# Patient Record
Sex: FEMALE | Race: WHITE | Employment: OTHER | URBAN - METROPOLITAN AREA
[De-identification: names, ages, dates, MRNs, and addresses within clinical notes are randomized per-mention and may not be internally consistent; named-entity substitution may affect disease eponyms.]

---

## 2017-06-07 ENCOUNTER — OFFICE VISIT (OUTPATIENT)
Dept: FAMILY MEDICINE CLINIC | Age: 62
End: 2017-06-07

## 2017-06-07 VITALS
SYSTOLIC BLOOD PRESSURE: 126 MMHG | BODY MASS INDEX: 25.76 KG/M2 | DIASTOLIC BLOOD PRESSURE: 78 MMHG | WEIGHT: 140 LBS | HEIGHT: 62 IN | HEART RATE: 79 BPM | OXYGEN SATURATION: 97 %

## 2017-06-07 DIAGNOSIS — Z00.00 ROUTINE PHYSICAL EXAMINATION: ICD-10-CM

## 2017-06-07 DIAGNOSIS — Z00.00 ROUTINE PHYSICAL EXAMINATION: Primary | ICD-10-CM

## 2017-06-07 PROCEDURE — 99396 PREV VISIT EST AGE 40-64: CPT | Performed by: FAMILY MEDICINE

## 2017-06-07 ASSESSMENT — ENCOUNTER SYMPTOMS
ABDOMINAL PAIN: 0
COLOR CHANGE: 0
NAUSEA: 0
EYE REDNESS: 0
SHORTNESS OF BREATH: 0
EYE DISCHARGE: 0

## 2017-06-08 LAB
A/G RATIO: 1.8 (ref 1.1–2.2)
ALBUMIN SERPL-MCNC: 4.4 G/DL (ref 3.4–5)
ALP BLD-CCNC: 87 U/L (ref 40–129)
ALT SERPL-CCNC: 24 U/L (ref 10–40)
ANION GAP SERPL CALCULATED.3IONS-SCNC: 18 MMOL/L (ref 3–16)
AST SERPL-CCNC: 17 U/L (ref 15–37)
BILIRUB SERPL-MCNC: 0.5 MG/DL (ref 0–1)
BUN BLDV-MCNC: 15 MG/DL (ref 7–20)
CALCIUM SERPL-MCNC: 9.2 MG/DL (ref 8.3–10.6)
CHLORIDE BLD-SCNC: 99 MMOL/L (ref 99–110)
CHOLESTEROL, TOTAL: 184 MG/DL (ref 0–199)
CO2: 23 MMOL/L (ref 21–32)
CREAT SERPL-MCNC: 0.6 MG/DL (ref 0.6–1.2)
GFR AFRICAN AMERICAN: >60
GFR NON-AFRICAN AMERICAN: >60
GLOBULIN: 2.4 G/DL
GLUCOSE BLD-MCNC: 94 MG/DL (ref 70–99)
HCT VFR BLD CALC: 39.9 % (ref 36–48)
HDLC SERPL-MCNC: 58 MG/DL (ref 40–60)
HEMOGLOBIN: 12.9 G/DL (ref 12–16)
LDL CHOLESTEROL CALCULATED: 95 MG/DL
MCH RBC QN AUTO: 29.5 PG (ref 26–34)
MCHC RBC AUTO-ENTMCNC: 32.3 G/DL (ref 31–36)
MCV RBC AUTO: 91.3 FL (ref 80–100)
PDW BLD-RTO: 14.4 % (ref 12.4–15.4)
PLATELET # BLD: 256 K/UL (ref 135–450)
PMV BLD AUTO: 7.3 FL (ref 5–10.5)
POTASSIUM SERPL-SCNC: 4.4 MMOL/L (ref 3.5–5.1)
RBC # BLD: 4.37 M/UL (ref 4–5.2)
SODIUM BLD-SCNC: 140 MMOL/L (ref 136–145)
TOTAL PROTEIN: 6.8 G/DL (ref 6.4–8.2)
TRIGL SERPL-MCNC: 155 MG/DL (ref 0–150)
VLDLC SERPL CALC-MCNC: 31 MG/DL
WBC # BLD: 4 K/UL (ref 4–11)

## 2017-11-09 ENCOUNTER — OFFICE VISIT (OUTPATIENT)
Dept: FAMILY MEDICINE CLINIC | Age: 62
End: 2017-11-09

## 2017-11-09 VITALS
SYSTOLIC BLOOD PRESSURE: 132 MMHG | DIASTOLIC BLOOD PRESSURE: 78 MMHG | WEIGHT: 141 LBS | BODY MASS INDEX: 25.95 KG/M2 | HEIGHT: 62 IN | HEART RATE: 79 BPM | OXYGEN SATURATION: 98 %

## 2017-11-09 DIAGNOSIS — R42 VERTIGO: Primary | ICD-10-CM

## 2017-11-09 DIAGNOSIS — Z23 IMMUNIZATION DUE: ICD-10-CM

## 2017-11-09 PROCEDURE — 90686 IIV4 VACC NO PRSV 0.5 ML IM: CPT | Performed by: NURSE PRACTITIONER

## 2017-11-09 PROCEDURE — G8427 DOCREV CUR MEDS BY ELIG CLIN: HCPCS | Performed by: NURSE PRACTITIONER

## 2017-11-09 PROCEDURE — G8484 FLU IMMUNIZE NO ADMIN: HCPCS | Performed by: NURSE PRACTITIONER

## 2017-11-09 PROCEDURE — 1036F TOBACCO NON-USER: CPT | Performed by: NURSE PRACTITIONER

## 2017-11-09 PROCEDURE — 3017F COLORECTAL CA SCREEN DOC REV: CPT | Performed by: NURSE PRACTITIONER

## 2017-11-09 PROCEDURE — 99213 OFFICE O/P EST LOW 20 MIN: CPT | Performed by: NURSE PRACTITIONER

## 2017-11-09 PROCEDURE — 90471 IMMUNIZATION ADMIN: CPT | Performed by: NURSE PRACTITIONER

## 2017-11-09 PROCEDURE — G8417 CALC BMI ABV UP PARAM F/U: HCPCS | Performed by: NURSE PRACTITIONER

## 2017-11-09 PROCEDURE — 3014F SCREEN MAMMO DOC REV: CPT | Performed by: NURSE PRACTITIONER

## 2017-11-09 RX ORDER — MECLIZINE HYDROCHLORIDE 25 MG/1
25 TABLET ORAL 3 TIMES DAILY PRN
Qty: 30 TABLET | Refills: 0 | Status: SHIPPED | OUTPATIENT
Start: 2017-11-09 | End: 2017-11-19

## 2017-11-09 ASSESSMENT — ENCOUNTER SYMPTOMS
NAUSEA: 0
SWOLLEN GLANDS: 0
CHANGE IN BOWEL HABIT: 0
VISUAL CHANGE: 0
SORE THROAT: 0

## 2017-11-09 NOTE — PROGRESS NOTES
gallop and no friction rub. No murmur heard. Pulmonary/Chest: Breath sounds normal. No stridor. No respiratory distress. She has no wheezes. She has no rales. Lymphadenopathy:     She has no cervical adenopathy. Neurological: She is alert and oriented to person, place, and time. She has normal reflexes. She displays no atrophy, no tremor and normal reflexes. No cranial nerve deficit or sensory deficit. She exhibits normal muscle tone. Coordination and gait normal.   Reflex Scores:       Tricep reflexes are 2+ on the right side and 2+ on the left side. Bicep reflexes are 2+ on the right side and 2+ on the left side. Brachioradialis reflexes are 2+ on the right side and 2+ on the left side. Patellar reflexes are 2+ on the right side and 2+ on the left side. Skin: Skin is warm and dry. No rash noted. She is not diaphoretic. No erythema. No pallor. Nursing note and vitals reviewed. Assessment:   1. Vertigo  - meclizine (ANTIVERT) 25 MG tablet; Take 1 tablet by mouth 3 times daily as needed for Dizziness  Dispense: 30 tablet; Refill: 0    2. Immunization due  - INFLUENZA, QUADV, 3 YRS AND OLDER, IM, PF, PREFILL SYR OR SDV, 0.5ML (FLUZONE QUADV, PF)     Plan:   - Meclizine TID PRN  - Food fluid intake  - Vertigo head exercises  - Follow up Monday if no better, if symptoms worsen to ER    Patient Instructions   Meclizine as needed for dizziness  Vertigo exercises  Look on Youtube for vertigo exercises from ENT doctor  Follow up Monday if no better    Patient Education        Vertigo: Exercises  Your Care Instructions  Here are some examples of typical rehabilitation exercises for your condition. Start each exercise slowly. Ease off the exercise if you start to have pain. Your doctor or physical therapist will tell you when you can start these exercises and which ones will work best for you. How to do the exercises  Note: Do these exercises twice a day.  Try to progress to doing each head

## 2017-11-29 ENCOUNTER — HOSPITAL ENCOUNTER (OUTPATIENT)
Dept: MAMMOGRAPHY | Age: 62
Discharge: HOME | End: 2017-11-29
Attending: OBSTETRICS & GYNECOLOGY | Admitting: OBSTETRICS & GYNECOLOGY

## 2017-11-29 DIAGNOSIS — Z12.31 SCREENING MAMMOGRAM, ENCOUNTER FOR: ICD-10-CM

## 2018-06-08 ENCOUNTER — OFFICE VISIT (OUTPATIENT)
Dept: FAMILY MEDICINE CLINIC | Age: 63
End: 2018-06-08

## 2018-06-08 VITALS
SYSTOLIC BLOOD PRESSURE: 140 MMHG | WEIGHT: 147 LBS | HEART RATE: 58 BPM | OXYGEN SATURATION: 98 % | DIASTOLIC BLOOD PRESSURE: 80 MMHG | HEIGHT: 62 IN | BODY MASS INDEX: 27.05 KG/M2

## 2018-06-08 DIAGNOSIS — Z00.00 ANNUAL PHYSICAL EXAM: ICD-10-CM

## 2018-06-08 DIAGNOSIS — Z00.00 ANNUAL PHYSICAL EXAM: Primary | ICD-10-CM

## 2018-06-08 LAB
A/G RATIO: 2 (ref 1.1–2.2)
ALBUMIN SERPL-MCNC: 4.7 G/DL (ref 3.4–5)
ALP BLD-CCNC: 88 U/L (ref 40–129)
ALT SERPL-CCNC: 27 U/L (ref 10–40)
ANION GAP SERPL CALCULATED.3IONS-SCNC: 15 MMOL/L (ref 3–16)
AST SERPL-CCNC: 18 U/L (ref 15–37)
BILIRUB SERPL-MCNC: 0.4 MG/DL (ref 0–1)
BUN BLDV-MCNC: 18 MG/DL (ref 7–20)
CALCIUM SERPL-MCNC: 9.6 MG/DL (ref 8.3–10.6)
CHLORIDE BLD-SCNC: 102 MMOL/L (ref 99–110)
CHOLESTEROL, TOTAL: 210 MG/DL (ref 0–199)
CO2: 25 MMOL/L (ref 21–32)
CREAT SERPL-MCNC: 0.6 MG/DL (ref 0.6–1.2)
GFR AFRICAN AMERICAN: >60
GFR NON-AFRICAN AMERICAN: >60
GLOBULIN: 2.3 G/DL
GLUCOSE BLD-MCNC: 95 MG/DL (ref 70–99)
HCT VFR BLD CALC: 39.4 % (ref 36–48)
HDLC SERPL-MCNC: 57 MG/DL (ref 40–60)
HEMOGLOBIN: 13.1 G/DL (ref 12–16)
LDL CHOLESTEROL CALCULATED: 101 MG/DL
MCH RBC QN AUTO: 30.4 PG (ref 26–34)
MCHC RBC AUTO-ENTMCNC: 33.2 G/DL (ref 31–36)
MCV RBC AUTO: 91.7 FL (ref 80–100)
PDW BLD-RTO: 14 % (ref 12.4–15.4)
PLATELET # BLD: 297 K/UL (ref 135–450)
PMV BLD AUTO: 7.6 FL (ref 5–10.5)
POTASSIUM SERPL-SCNC: 4.4 MMOL/L (ref 3.5–5.1)
RBC # BLD: 4.3 M/UL (ref 4–5.2)
SODIUM BLD-SCNC: 142 MMOL/L (ref 136–145)
TOTAL PROTEIN: 7 G/DL (ref 6.4–8.2)
TRIGL SERPL-MCNC: 259 MG/DL (ref 0–150)
VLDLC SERPL CALC-MCNC: 52 MG/DL
WBC # BLD: 5.2 K/UL (ref 4–11)

## 2018-06-08 PROCEDURE — 99396 PREV VISIT EST AGE 40-64: CPT | Performed by: FAMILY MEDICINE

## 2018-06-08 ASSESSMENT — ENCOUNTER SYMPTOMS
EYE DISCHARGE: 0
SHORTNESS OF BREATH: 0
NAUSEA: 0
ABDOMINAL PAIN: 0
COLOR CHANGE: 0
EYE REDNESS: 0

## 2018-06-08 ASSESSMENT — PATIENT HEALTH QUESTIONNAIRE - PHQ9
1. LITTLE INTEREST OR PLEASURE IN DOING THINGS: 0
SUM OF ALL RESPONSES TO PHQ9 QUESTIONS 1 & 2: 0
SUM OF ALL RESPONSES TO PHQ QUESTIONS 1-9: 0
2. FEELING DOWN, DEPRESSED OR HOPELESS: 0

## 2018-07-06 ENCOUNTER — TELEPHONE (OUTPATIENT)
Dept: FAMILY MEDICINE CLINIC | Age: 63
End: 2018-07-06

## 2018-07-06 VITALS — DIASTOLIC BLOOD PRESSURE: 69 MMHG | SYSTOLIC BLOOD PRESSURE: 124 MMHG

## 2018-07-06 NOTE — TELEPHONE ENCOUNTER
Pt was to call in and give the BP readings for the month the readings are.     The wk of  6-15 (129-77)  6-22  (131-72  6-29  (129-78)  7-06 (124-69)    Pt sated that she has joined [employer] and is walking 3 to 4 times a wek

## 2018-12-11 ENCOUNTER — HOSPITAL ENCOUNTER (OUTPATIENT)
Dept: MAMMOGRAPHY | Age: 63
Discharge: HOME | End: 2018-12-11
Payer: COMMERCIAL

## 2018-12-11 DIAGNOSIS — Z12.31 ENCOUNTER FOR SCREENING MAMMOGRAM FOR BREAST CANCER: ICD-10-CM

## 2018-12-11 PROCEDURE — 77063 BREAST TOMOSYNTHESIS BI: CPT

## 2019-03-15 ENCOUNTER — TELEPHONE (OUTPATIENT)
Dept: FAMILY MEDICINE CLINIC | Age: 64
End: 2019-03-15

## 2019-05-20 ENCOUNTER — TELEPHONE (OUTPATIENT)
Dept: FAMILY MEDICINE CLINIC | Age: 64
End: 2019-05-20

## 2019-05-20 ENCOUNTER — NURSE TRIAGE (OUTPATIENT)
Dept: OTHER | Facility: CLINIC | Age: 64
End: 2019-05-20

## 2019-05-20 NOTE — TELEPHONE ENCOUNTER
Patient called with intermittent shortness of breath. Nurse triage recommended patient to go to ER due to past history of Pulmonary embolus. Patient refused. I then spoke with patient, explained that Dr. Artie Ferris was not in, and office was completely booked today and with her symptoms I felt she need to go to the ER. She stated she thought she was just having allergy issues. Patient stated she would think about going to the ER, we again explained that with her past history she should not wait. patient

## 2019-05-20 NOTE — TELEPHONE ENCOUNTER
Reason for Disposition   Any history of prior \"blood clot\" in leg or lungs    Protocols used: BREATHING DIFFICULTY-ADULT-OH    Pt c/o mild SOB that comes and goes over the last 3 or 4 days. Pt describes that it feels hard to take a deep breath in. Pt not sure if it is r/t high pollen count or not. Pt does report h/o pulmonary emboli around 30 years ago, she is not on blood thinners now. Pt denies cardiac history except that she is on a BP medication. Denies pulmonary history except for PEs. Pt denies chest pain, dizziness. Pt has had this before, but this time it is not going away. Caller with symptoms as documented above. Caller informed of disposition, but pt does not want to go to ER. Soft transfer to PCP office, unable to consult with provider, however did speak with Eveline.  This writer reinforced recommendation of ER and pt agreeable. Care advice as documented.

## 2019-06-11 ENCOUNTER — OFFICE VISIT (OUTPATIENT)
Dept: FAMILY MEDICINE CLINIC | Age: 64
End: 2019-06-11
Payer: COMMERCIAL

## 2019-06-11 VITALS
HEIGHT: 62 IN | DIASTOLIC BLOOD PRESSURE: 70 MMHG | SYSTOLIC BLOOD PRESSURE: 130 MMHG | BODY MASS INDEX: 27.42 KG/M2 | OXYGEN SATURATION: 97 % | WEIGHT: 149 LBS | HEART RATE: 72 BPM

## 2019-06-11 DIAGNOSIS — Z00.00 ANNUAL PHYSICAL EXAM: ICD-10-CM

## 2019-06-11 DIAGNOSIS — Z00.00 ANNUAL PHYSICAL EXAM: Primary | ICD-10-CM

## 2019-06-11 LAB
A/G RATIO: 2.1 (ref 1.1–2.2)
ALBUMIN SERPL-MCNC: 4.7 G/DL (ref 3.4–5)
ALP BLD-CCNC: 84 U/L (ref 40–129)
ALT SERPL-CCNC: 44 U/L (ref 10–40)
ANION GAP SERPL CALCULATED.3IONS-SCNC: 14 MMOL/L (ref 3–16)
AST SERPL-CCNC: 23 U/L (ref 15–37)
BILIRUB SERPL-MCNC: 0.4 MG/DL (ref 0–1)
BUN BLDV-MCNC: 18 MG/DL (ref 7–20)
CALCIUM SERPL-MCNC: 9.5 MG/DL (ref 8.3–10.6)
CHLORIDE BLD-SCNC: 103 MMOL/L (ref 99–110)
CHOLESTEROL, TOTAL: 206 MG/DL (ref 0–199)
CO2: 22 MMOL/L (ref 21–32)
CREAT SERPL-MCNC: 0.7 MG/DL (ref 0.6–1.2)
GFR AFRICAN AMERICAN: >60
GFR NON-AFRICAN AMERICAN: >60
GLOBULIN: 2.2 G/DL
GLUCOSE BLD-MCNC: 107 MG/DL (ref 70–99)
HCT VFR BLD CALC: 39.6 % (ref 36–48)
HDLC SERPL-MCNC: 53 MG/DL (ref 40–60)
HEMOGLOBIN: 13 G/DL (ref 12–16)
LDL CHOLESTEROL CALCULATED: 120 MG/DL
MCH RBC QN AUTO: 29.7 PG (ref 26–34)
MCHC RBC AUTO-ENTMCNC: 32.9 G/DL (ref 31–36)
MCV RBC AUTO: 90.5 FL (ref 80–100)
PDW BLD-RTO: 13.6 % (ref 12.4–15.4)
PLATELET # BLD: 292 K/UL (ref 135–450)
PMV BLD AUTO: 7.9 FL (ref 5–10.5)
POTASSIUM SERPL-SCNC: 4.5 MMOL/L (ref 3.5–5.1)
RBC # BLD: 4.37 M/UL (ref 4–5.2)
SODIUM BLD-SCNC: 139 MMOL/L (ref 136–145)
TOTAL PROTEIN: 6.9 G/DL (ref 6.4–8.2)
TRIGL SERPL-MCNC: 163 MG/DL (ref 0–150)
VLDLC SERPL CALC-MCNC: 33 MG/DL
WBC # BLD: 4.6 K/UL (ref 4–11)

## 2019-06-11 PROCEDURE — 90471 IMMUNIZATION ADMIN: CPT | Performed by: FAMILY MEDICINE

## 2019-06-11 PROCEDURE — 90715 TDAP VACCINE 7 YRS/> IM: CPT | Performed by: FAMILY MEDICINE

## 2019-06-11 PROCEDURE — 99396 PREV VISIT EST AGE 40-64: CPT | Performed by: FAMILY MEDICINE

## 2019-06-11 ASSESSMENT — PATIENT HEALTH QUESTIONNAIRE - PHQ9
SUM OF ALL RESPONSES TO PHQ QUESTIONS 1-9: 0
2. FEELING DOWN, DEPRESSED OR HOPELESS: 0
SUM OF ALL RESPONSES TO PHQ9 QUESTIONS 1 & 2: 0
SUM OF ALL RESPONSES TO PHQ QUESTIONS 1-9: 0
1. LITTLE INTEREST OR PLEASURE IN DOING THINGS: 0

## 2019-06-11 ASSESSMENT — ENCOUNTER SYMPTOMS
NAUSEA: 0
EYE REDNESS: 0
EYE DISCHARGE: 0
ABDOMINAL PAIN: 0
SHORTNESS OF BREATH: 0
COLOR CHANGE: 0

## 2019-06-11 NOTE — PROGRESS NOTES
2019    Glenroy Melgoza (:  1955) is a 60 y.o. female, here for a preventive medicine evaluation. No complaints  Has been doing curves for exercise  Patient Active Problem List   Diagnosis    Personal history of pulmonary embolism    PMB (postmenopausal bleeding)    Endometrial polyp    Vulvar itching    Lesion of labia, left    Dermatosis, chronic inflammatory    Lichen planus suspected    Hypertension    Calculus of gallbladder with acute on chronic cholecystitis without obstruction     Vitals:    19 0809 19 0814 19 0825   BP: (!) 148/86 (!) 142/88 130/70   Site: Left Upper Arm Right Upper Arm    Position: Sitting Sitting    Cuff Size: Medium Adult Medium Adult    Pulse: 72     SpO2: 97%     Weight: 149 lb (67.6 kg)     Height: 5' 2\" (1.575 m)       Body mass index is 27.25 kg/m². Wt Readings from Last 3 Encounters:   19 149 lb (67.6 kg)   18 147 lb (66.7 kg)   17 141 lb (64 kg)     BP Readings from Last 3 Encounters:   19 130/70   18 124/69   18 (!) 140/80          Review of Systems   Constitutional: Negative for unexpected weight change. HENT: Negative for congestion. Eyes: Negative for discharge and redness. Respiratory: Negative for shortness of breath. Cardiovascular: Negative for chest pain, palpitations and leg swelling. Gastrointestinal: Negative for abdominal pain and nausea. Genitourinary: Negative for flank pain. Musculoskeletal: Negative for gait problem. Skin: Negative for color change and pallor. Neurological: Negative for facial asymmetry and speech difficulty. Hematological: Does not bruise/bleed easily. Psychiatric/Behavioral: Negative for confusion. Prior to Visit Medications    Medication Sig Taking?  Authorizing Provider   amLODIPine (NORVASC) 5 MG tablet TAKE 1 TABLET BY MOUTH DAILY Yes Sonny Briseno, DO        Allergies   Allergen Reactions    Latex Other (See Comments)     Yeast infection    Codeine Nausea And Vomiting       Past Medical History:   Diagnosis Date    Anxiety     Broken ankle 11-1-2011    Hypertension     Osteopenia     Reflux        Past Surgical History:   Procedure Laterality Date    BREAST ENHANCEMENT SURGERY  1987    BREAST ENHANCEMENT SURGERY      silicone    CHOLECYSTECTOMY  6/17/2016    laparoscopic    COLPOSCOPY      DILATION AND CURETTAGE OF UTERUS  9/20/13    EYE SURGERY  2006    both lens replaced    GALLBLADDER SURGERY      UPPER GASTROINTESTINAL ENDOSCOPY  5/19/11    colonoscopy       Social History     Socioeconomic History    Marital status:      Spouse name: Not on file    Number of children: Not on file    Years of education: Not on file    Highest education level: Not on file   Occupational History    Not on file   Social Needs    Financial resource strain: Not on file    Food insecurity:     Worry: Not on file     Inability: Not on file    Transportation needs:     Medical: Not on file     Non-medical: Not on file   Tobacco Use    Smoking status: Never Smoker    Smokeless tobacco: Never Used   Substance and Sexual Activity    Alcohol use: Yes     Comment: occasional    Drug use: No    Sexual activity: Yes     Partners: Male   Lifestyle    Physical activity:     Days per week: Not on file     Minutes per session: Not on file    Stress: Not on file   Relationships    Social connections:     Talks on phone: Not on file     Gets together: Not on file     Attends Caodaism service: Not on file     Active member of club or organization: Not on file     Attends meetings of clubs or organizations: Not on file     Relationship status: Not on file    Intimate partner violence:     Fear of current or ex partner: Not on file     Emotionally abused: Not on file     Physically abused: Not on file     Forced sexual activity: Not on file   Other Topics Concern    Not on file   Social History Narrative    Not on file       Family History   Problem Relation Age of Onset    Cancer Mother 58        shoulder    Diabetes Father            Vitals:    06/11/19 0809 06/11/19 0814 06/11/19 0825   BP: (!) 148/86 (!) 142/88 130/70   Site: Left Upper Arm Right Upper Arm    Position: Sitting Sitting    Cuff Size: Medium Adult Medium Adult    Pulse: 72     SpO2: 97%     Weight: 149 lb (67.6 kg)     Height: 5' 2\" (1.575 m)       Estimated body mass index is 27.25 kg/m² as calculated from the following:    Height as of this encounter: 5' 2\" (1.575 m). Weight as of this encounter: 149 lb (67.6 kg). Physical Exam   Constitutional: She is oriented to person, place, and time. She appears well-developed and well-nourished. No distress. HENT:   Head: Normocephalic and atraumatic. Right Ear: External ear normal.   Left Ear: External ear normal.   Nose: Nose normal.   Eyes: Pupils are equal, round, and reactive to light. Conjunctivae and EOM are normal. No scleral icterus. Neck: Normal range of motion. Neck supple. No thyromegaly present. Cardiovascular: Normal rate, regular rhythm, normal heart sounds and intact distal pulses. Exam reveals no gallop and no friction rub. No murmur heard. Pulmonary/Chest: Effort normal and breath sounds normal. No stridor. No respiratory distress. She has no wheezes. She has no rales. Abdominal: Soft. Bowel sounds are normal. She exhibits no mass. There is no tenderness. Musculoskeletal: She exhibits no edema or tenderness. Lymphadenopathy:     She has no cervical adenopathy. Neurological: She is alert and oriented to person, place, and time. She has normal reflexes. Skin: Skin is warm and dry. No rash noted. She is not diaphoretic. No erythema. No pallor. Psychiatric: She has a normal mood and affect. Her behavior is normal. Judgment and thought content normal.   Nursing note and vitals reviewed.               The 10-year ASCVD risk score (Praful Burgess et al., 2013) is: 5.7%    Values used to calculate the score:      Age: 60 years      Sex: Female      Is Non- : No      Diabetic: No      Tobacco smoker: No      Systolic Blood Pressure: 487 mmHg      Is BP treated: Yes      HDL Cholesterol: 57 mg/dL      Total Cholesterol: 210 mg/dL    Immunization History   Administered Date(s) Administered    Influenza, Quadv, 3 yrs and older, IM, PF (Fluzone 3 yrs and older or Afluria 5 yrs and older) 11/09/2017       Health Maintenance   Topic Date Due    Hepatitis C screen  1955    HIV screen  05/31/1972    DTaP/Tdap/Td vaccine (1 - Tdap) 05/31/1976    Shingles Vaccine (1 of 2) 05/31/2007    Cervical cancer screen  05/19/2018    Flu vaccine (Season Ended) 09/01/2019    Breast cancer screen  12/11/2020    Colon cancer screen colonoscopy  05/25/2021    Lipid screen  06/08/2023    Pneumococcal 0-64 years Vaccine  Aged Out       ASSESSMENT/PLAN. Nydia Lai was seen today for annual exam.    Diagnoses and all orders for this visit:    Annual physical exam  -     Tdap (age 6y and older) IM ([address])  -     CBC; Future  -     Comprehensive Metabolic Panel; Future  -     Lipid Panel; Future  -     CT CARDIAC CALCIUM SCORING; Future            An electronic signature was used to authenticate this note. This note is dictated, errors are possible.   --Aimee Gross DO on 6/11/2019 at 8:31 AM

## 2019-06-11 NOTE — PATIENT INSTRUCTIONS
Your fasting blood sugar should be below 100. If it is between 100-125 that is considered high and known as prediabetes, or  impaired glucose tolerance. If your blood sugar is too high, go to diabetes. org for a diabetes prevention diet. If your blood sugar is above 125 on 2 occassions, fasting, then that meets the criteria for  diagnosis of diabetes. Decrease high carb foods if your blood sugar is high. High carbohydrate foods are:  Breads, muffins, rolls, and bagels  Pasta, rice, corn, and grains  Potatoes, sweet potatoes  Legumes: peas beans lentils. Milk ( almond milk ok)  Most fruit except berries  Cake cookies pies ice cream candy etc.  Juices, soda, sweetened ice tea  Beer. Cholesterol, the ideal numbers explained: Total cholesterol should be below 200  The triglycerides should be below 150  The HDL, the good cholesterol should be above 40  The LDL, the bad cholesterol should be below 100. Although it can be as high as 130 if no risk factors for heart disease or no diabetes. Improve your cholesterol profile:     Cardiovascular exercise helps. Start with 15 minutes three times a week and the work up to at least 30 minutes 5 days a week. Exercise at a level that you can carry on a conversation during. Loose extra pounds. Pay attention to your serving sides and avoid eating second helpings at meals. Significantly decrease the amount of processed food, junk food, and fast food that you eat. Decrease animal sources of saturated fats, and completely avoid and eliminate  hydrogenated oils and trans fats. Check the Food Label on the food you eat and avoid foods that have hydrogenated vegetable oils in them. That includes bakery products. Meat and cheese generally contains saturated fat. Drink skim milk which contains no fat. Increase the amount of fresh food that you cook yourself. Eat at least five servings of fruits and vegetables a day.         Increase the portion of your plate that contains the fruit and vegetables to at least 50%, and decrease the amount of starches like potatoes, rice, pasta to no more than 25% of your plate. Increase your fiber intake. Fiber is found in fruits and vegetables as well as whole grains, oatmeal,  and beans. A diet with at least 5 servings of fruits and vegetables should give you about 10-20grams of fiber daily. Switch to monounsaturated fats like olive oil. . Use these types of fats where you would have used butter. Fat from olives, avocados, coconut, or other nuts is okay. Add baked or grilled fish to you diet at least 1-2 times a week. Consider an Omega 3 fatty acid supplement which can raise the HDL good cholesterol, and possible decrease the triglycerides in some patients. 2-4 grams a day is recommended. Learn more about cholesterol on the Internet. Check out these resources:    American Heart Association   Heart. [address]:   [employer]    Triglycerides can be lowered without medication by exercising, and loosing weight and eating a diet lower in carbohydrates especially from flour sources,  and avoiding simple sugars. Omega 3 fatty acids can help lower triglyceride levels, 2-4 grams a day. The good cholesterol is HDL. In order to increase the good cholesterol, increasing cardiovascular exercise helps. Avoid hydrogenated oils (trans fats) in processed, bakery,  and junk food. Use monounsaturated fats such as olive oil can help raise the good cholesterol. For your weight, exercise 30 minutes 5 times weekly and improve the types of food you eat:    Protein   Best options: The American Diabetes Association (ADA) recommends lean proteins low in saturated fat, like fish or turkey. Aim for two servings of seafood each week; some fish, like salmon, have the added benefit of containing heart healthy omega-3 fats. For a vegetarian protein source, experiment with the wide variety of beans.  Nuts, which are protein and items are nutritious, but should be eaten in moderation. The ADA groups them with grains because of their high carb content. Fruit   Best options: Fresh fruit can conquer your craving for sweets while providing antioxidants and fiber. Berries are a great option because recommended portion sizes are typically generous, which may leave you feeling more satisfied   Worst options: Avoid added sugar by eliminating fruits canned in syrup, and be aware that dried fruits have a very high sugar concentration. Also, fruit juices should be consumed rarely as theyre high in sugar and dont contain the same nutrients as whole fruit. Fats   Best options: Some types of fat actually help protect your heart. Choose the monounsaturated fats found in avocados, almonds, olives, and pecans or the polyunsaturated fats found in walnuts and sunflower oil, which can help to lower bad cholesterol.  Worst options: Saturated fats increase bad cholesterol, so limit butter, cheese, gravy, and fried foods. Keep calories from animal sources of saturated fat to less than 10 percent of your total daily intake. Trans fats are even worse than saturated fats, so avoid them completely. Look for the term hydrogenated on labels of processed foods such as packaged snacks, baked goods, and crackers.   

## 2019-06-25 ENCOUNTER — HOSPITAL ENCOUNTER (OUTPATIENT)
Dept: CT IMAGING | Age: 64
Discharge: HOME | End: 2019-06-25

## 2019-06-25 DIAGNOSIS — Z00.00 ANNUAL PHYSICAL EXAM: ICD-10-CM

## 2019-06-25 PROCEDURE — 75571 CT HRT W/O DYE W/CA TEST: CPT

## 2019-07-15 ENCOUNTER — OFFICE VISIT (OUTPATIENT)
Dept: OBGYN CLINIC | Age: 64
End: 2019-07-15
Payer: COMMERCIAL

## 2019-07-15 VITALS
DIASTOLIC BLOOD PRESSURE: 78 MMHG | HEIGHT: 62 IN | SYSTOLIC BLOOD PRESSURE: 144 MMHG | TEMPERATURE: 97.8 F | WEIGHT: 147.6 LBS | BODY MASS INDEX: 27.16 KG/M2 | HEART RATE: 68 BPM

## 2019-07-15 DIAGNOSIS — N89.8 VAGINAL ITCHING: ICD-10-CM

## 2019-07-15 DIAGNOSIS — Z12.4 PAP SMEAR FOR CERVICAL CANCER SCREENING: ICD-10-CM

## 2019-07-15 DIAGNOSIS — Z78.0 OSTEOPENIA AFTER MENOPAUSE: ICD-10-CM

## 2019-07-15 DIAGNOSIS — Z01.419 ENCOUNTER FOR ANNUAL ROUTINE GYNECOLOGICAL EXAMINATION: Primary | ICD-10-CM

## 2019-07-15 DIAGNOSIS — Z12.31 ENCOUNTER FOR SCREENING MAMMOGRAM FOR BREAST CANCER: ICD-10-CM

## 2019-07-15 DIAGNOSIS — M85.80 OSTEOPENIA AFTER MENOPAUSE: ICD-10-CM

## 2019-07-15 PROCEDURE — 99386 PREV VISIT NEW AGE 40-64: CPT | Performed by: OBSTETRICS & GYNECOLOGY

## 2019-07-15 ASSESSMENT — ENCOUNTER SYMPTOMS
DIARRHEA: 0
NAUSEA: 0
ABDOMINAL PAIN: 0
SHORTNESS OF BREATH: 0
VOMITING: 0
CONSTIPATION: 0

## 2019-07-15 NOTE — PROGRESS NOTES
Annual Exam      CC:   Chief Complaint   Patient presents with    Gynecologic Exam       HPI:  60 y.o. Dorie Soni presents for her gynecologic annual exam. Occasionally has vaginal itching, at the top and on the side of her vagina. Has seen multiple doctors for this and has not had anything that has helped or diagnosed it. Going on for years, on and off. No aggravating or alleviating factors. No discharge, has been treated for yeast in the past but this didn't help. Sometimes will use OTC monistat. Patient seen and examined. Review of Systems:   Review of Systems   Constitutional: Negative for chills and fever. Respiratory: Negative for shortness of breath. Cardiovascular: Negative for chest pain. Gastrointestinal: Negative for abdominal pain, constipation, diarrhea, nausea and vomiting. Genitourinary: Negative for dysuria, frequency, menstrual problem, vaginal bleeding and vaginal discharge. Neurological: Negative for dizziness, light-headedness and headaches.        Primary Care Physician: Benjamin Lorenz DO    Obstetric History  OB History    Para Term  AB Living   2 2 2     2   SAB TAB Ectopic Molar Multiple Live Births             2      # Outcome Date GA Lbr Shade/2nd Weight Sex Delivery Anes PTL Lv   2 Term 85 37w4d  6 lb (2.722 kg) M Vag-Spont      1 Term 82 40w0d  6 lb 12.8 oz (3.084 kg) F Vag-Spont          Gynecologic History  Menstrual History:   LMP: menopause age 47, used estradiol patches for about 15 years after that   Menstrual pattern: postmenopausal  Sexual History:   Contraception: n/a   Currently is sexually active   Denies history of STIs   Denies sexual problems  Pap History:   Last pap smear: 2017, NILM/HPV-   History of abnormal pap smears: denies    Medical History:  Past Medical History:   Diagnosis Date    Anxiety     Broken ankle 2011    Hypertension     Osteopenia     Reflux        Surgical History:  Past Surgical History: Procedure Laterality Date    BREAST ENHANCEMENT SURGERY  1987    BREAST ENHANCEMENT SURGERY      silicone    CHOLECYSTECTOMY  6/17/2016    laparoscopic    COLPOSCOPY      DILATION AND CURETTAGE OF UTERUS  9/20/13    EYE SURGERY  2006    both lens replaced    GALLBLADDER SURGERY      UPPER GASTROINTESTINAL ENDOSCOPY  5/19/11    colonoscopy       Medications:  Current Outpatient Medications   Medication Sig Dispense Refill    amLODIPine (NORVASC) 5 MG tablet TAKE 1 TABLET BY MOUTH DAILY 30 tablet 5     No current facility-administered medications for this visit. Allergies:   Allergies   Allergen Reactions    Latex Other (See Comments)     Yeast infection    Codeine Nausea And Vomiting       Social History:  Social History     Socioeconomic History    Marital status:      Spouse name: None    Number of children: None    Years of education: None    Highest education level: None   Occupational History    None   Social Needs    Financial resource strain: None    Food insecurity:     Worry: None     Inability: None    Transportation needs:     Medical: None     Non-medical: None   Tobacco Use    Smoking status: Never Smoker    Smokeless tobacco: Never Used   Substance and Sexual Activity    Alcohol use: Yes     Comment: occasional    Drug use: No    Sexual activity: Yes     Partners: Male   Lifestyle    Physical activity:     Days per week: None     Minutes per session: None    Stress: None   Relationships    Social connections:     Talks on phone: None     Gets together: None     Attends Synagogue service: None     Active member of club or organization: None     Attends meetings of clubs or organizations: None     Relationship status: None    Intimate partner violence:     Fear of current or ex partner: None     Emotionally abused: None     Physically abused: None     Forced sexual activity: None   Other Topics Concern    None   Social History Narrative    None       Family

## 2019-07-17 LAB
HPV COMMENT: NORMAL
HPV TYPE 16: NOT DETECTED
HPV TYPE 18: NOT DETECTED
HPVOH (OTHER TYPES): NOT DETECTED

## 2019-08-02 RX ORDER — AMLODIPINE BESYLATE 5 MG/1
TABLET ORAL
Qty: 30 TABLET | Refills: 11 | Status: SHIPPED | OUTPATIENT
Start: 2019-08-02 | End: 2020-06-16 | Stop reason: SDUPTHER

## 2019-12-09 ENCOUNTER — TELEPHONE (OUTPATIENT)
Dept: FAMILY MEDICINE CLINIC | Age: 64
End: 2019-12-09

## 2019-12-10 RX ORDER — SCOLOPAMINE TRANSDERMAL SYSTEM 1 MG/1
1 PATCH, EXTENDED RELEASE TRANSDERMAL
Qty: 2 PATCH | Refills: 0 | Status: SHIPPED | OUTPATIENT
Start: 2019-12-10 | End: 2020-06-10 | Stop reason: ALTCHOICE

## 2019-12-11 ENCOUNTER — OFFICE VISIT (OUTPATIENT)
Dept: DERMATOLOGY | Age: 64
End: 2019-12-11
Payer: COMMERCIAL

## 2019-12-11 DIAGNOSIS — Z12.83 SKIN CANCER SCREENING: ICD-10-CM

## 2019-12-11 DIAGNOSIS — L57.0 ACTINIC KERATOSIS: Primary | ICD-10-CM

## 2019-12-11 DIAGNOSIS — D22.9 MULTIPLE BENIGN MELANOCYTIC NEVI: ICD-10-CM

## 2019-12-11 PROCEDURE — G8419 CALC BMI OUT NRM PARAM NOF/U: HCPCS | Performed by: DERMATOLOGY

## 2019-12-11 PROCEDURE — 1036F TOBACCO NON-USER: CPT | Performed by: DERMATOLOGY

## 2019-12-11 PROCEDURE — G8484 FLU IMMUNIZE NO ADMIN: HCPCS | Performed by: DERMATOLOGY

## 2019-12-11 PROCEDURE — 99203 OFFICE O/P NEW LOW 30 MIN: CPT | Performed by: DERMATOLOGY

## 2019-12-11 PROCEDURE — 3017F COLORECTAL CA SCREEN DOC REV: CPT | Performed by: DERMATOLOGY

## 2019-12-11 PROCEDURE — G8427 DOCREV CUR MEDS BY ELIG CLIN: HCPCS | Performed by: DERMATOLOGY

## 2019-12-11 PROCEDURE — 17000 DESTRUCT PREMALG LESION: CPT | Performed by: DERMATOLOGY

## 2019-12-26 ENCOUNTER — TELEPHONE (OUTPATIENT)
Dept: DERMATOLOGY | Age: 64
End: 2019-12-26

## 2020-06-01 ENCOUNTER — HOSPITAL ENCOUNTER (OUTPATIENT)
Dept: MAMMOGRAPHY | Age: 65
Discharge: HOME | End: 2020-06-01
Payer: COMMERCIAL

## 2020-06-01 ENCOUNTER — TELEPHONE (OUTPATIENT)
Dept: MAMMOGRAPHY | Age: 65
End: 2020-06-01

## 2020-06-01 PROCEDURE — 77063 BREAST TOMOSYNTHESIS BI: CPT

## 2020-06-16 ENCOUNTER — VIRTUAL VISIT (OUTPATIENT)
Dept: FAMILY MEDICINE CLINIC | Age: 65
End: 2020-06-16
Payer: COMMERCIAL

## 2020-06-16 PROCEDURE — 99396 PREV VISIT EST AGE 40-64: CPT | Performed by: FAMILY MEDICINE

## 2020-06-16 RX ORDER — AMLODIPINE BESYLATE 5 MG/1
TABLET ORAL
Qty: 30 TABLET | Refills: 11 | Status: SHIPPED | OUTPATIENT
Start: 2020-06-16 | End: 2021-06-09

## 2020-06-16 NOTE — PROGRESS NOTES
session: Not on file    Stress: Not on file   Relationships    Social connections     Talks on phone: Not on file     Gets together: Not on file     Attends Scientologist service: Not on file     Active member of club or organization: Not on file     Attends meetings of clubs or organizations: Not on file     Relationship status: Not on file    Intimate partner violence     Fear of current or ex partner: Not on file     Emotionally abused: Not on file     Physically abused: Not on file     Forced sexual activity: Not on file   Other Topics Concern    Not on file   Social History Narrative    Not on file     Allergies   Allergen Reactions    Latex Other (See Comments)     Yeast infection    Codeine Nausea And Vomiting           Review of Systems   Constitutional: Negative. HENT: Negative. Eyes: Negative for blurred vision, discharge and redness. Respiratory: Negative for chest tightness and shortness of breath. Cardiovascular: Negative for chest pain and palpitations. Gastrointestinal: Negative. Endocrine: Negative. Genitourinary: Negative. Musculoskeletal: Negative. Allergic/Immunologic: Negative for immunocompromised state. Neurological: Negative for dizziness. Psychiatric/Behavioral: Negative for agitation, behavioral problems and confusion. All other systems reviewed and are negative. .      Constitutional: [x] Appears well-developed and well-nourished [x] No apparent distress      [] Abnormal-   Mental status  [x] Alert and awake  [x] Oriented to person/place/time [x]Able to follow commands      Eyes:  EOM    [x]  Normal  [] Abnormal-  Sclera  [x]  Normal  [] Abnormal -         Discharge [x]  None visible  [] Abnormal -    HENT:   [x] Normocephalic, atraumatic.   [] Abnormal   [] Mouth/Throat: Mucous membranes are moist.     External Ears [x] Normal  [] Abnormal-     Neck: [x] No visualized mass     Pulmonary/Chest: [x] Respiratory effort normal.  [x] No visualized signs of

## 2020-06-17 ENCOUNTER — NURSE ONLY (OUTPATIENT)
Dept: FAMILY MEDICINE CLINIC | Age: 65
End: 2020-06-17
Payer: COMMERCIAL

## 2020-06-17 VITALS — TEMPERATURE: 98.2 F

## 2020-06-17 LAB
A/G RATIO: 2.1 (ref 1.1–2.2)
ALBUMIN SERPL-MCNC: 4.4 G/DL (ref 3.4–5)
ALP BLD-CCNC: 84 U/L (ref 40–129)
ALT SERPL-CCNC: 33 U/L (ref 10–40)
ANION GAP SERPL CALCULATED.3IONS-SCNC: 12 MMOL/L (ref 3–16)
AST SERPL-CCNC: 24 U/L (ref 15–37)
BILIRUB SERPL-MCNC: 0.4 MG/DL (ref 0–1)
BUN BLDV-MCNC: 18 MG/DL (ref 7–20)
CALCIUM SERPL-MCNC: 9.1 MG/DL (ref 8.3–10.6)
CHLORIDE BLD-SCNC: 106 MMOL/L (ref 99–110)
CHOLESTEROL, TOTAL: 202 MG/DL (ref 0–199)
CO2: 24 MMOL/L (ref 21–32)
CREAT SERPL-MCNC: 0.7 MG/DL (ref 0.6–1.2)
GFR AFRICAN AMERICAN: >60
GFR NON-AFRICAN AMERICAN: >60
GLOBULIN: 2.1 G/DL
GLUCOSE BLD-MCNC: 107 MG/DL (ref 70–99)
HCT VFR BLD CALC: 39.1 % (ref 36–48)
HDLC SERPL-MCNC: 55 MG/DL (ref 40–60)
HEMOGLOBIN: 13.2 G/DL (ref 12–16)
LDL CHOLESTEROL CALCULATED: 115 MG/DL
MCH RBC QN AUTO: 30.5 PG (ref 26–34)
MCHC RBC AUTO-ENTMCNC: 33.6 G/DL (ref 31–36)
MCV RBC AUTO: 90.9 FL (ref 80–100)
PDW BLD-RTO: 14 % (ref 12.4–15.4)
PLATELET # BLD: 280 K/UL (ref 135–450)
PLATELET SLIDE REVIEW: ADEQUATE
PMV BLD AUTO: 8.7 FL (ref 5–10.5)
POTASSIUM SERPL-SCNC: 4.3 MMOL/L (ref 3.5–5.1)
RBC # BLD: 4.31 M/UL (ref 4–5.2)
SLIDE REVIEW: NORMAL
SODIUM BLD-SCNC: 142 MMOL/L (ref 136–145)
TOTAL PROTEIN: 6.5 G/DL (ref 6.4–8.2)
TRIGL SERPL-MCNC: 162 MG/DL (ref 0–150)
VLDLC SERPL CALC-MCNC: 32 MG/DL
WBC # BLD: 5.8 K/UL (ref 4–11)

## 2020-06-17 PROCEDURE — 36415 COLL VENOUS BLD VENIPUNCTURE: CPT | Performed by: FAMILY MEDICINE

## 2020-06-18 LAB
ESTIMATED AVERAGE GLUCOSE: 116.9 MG/DL
HBA1C MFR BLD: 5.7 %

## 2020-10-20 ENCOUNTER — OFFICE VISIT (OUTPATIENT)
Dept: DERMATOLOGY | Age: 65
End: 2020-10-20
Payer: COMMERCIAL

## 2020-10-20 PROCEDURE — 99214 OFFICE O/P EST MOD 30 MIN: CPT | Performed by: DERMATOLOGY

## 2020-10-20 PROCEDURE — 17110 DESTRUCTION B9 LES UP TO 14: CPT | Performed by: DERMATOLOGY

## 2020-10-20 PROCEDURE — 17000 DESTRUCT PREMALG LESION: CPT | Performed by: DERMATOLOGY

## 2020-10-20 NOTE — PROGRESS NOTES
Patient's Name: Carina Carroll  MRN: <[id number]>  YOB: 1955  Date of Visit: 10/20/2020  Primary Care Provider: Lucia Amor DO      Subjective:     Chief Complaint   Patient presents with    Skin Exam     hx of ak on face, previous cryo done to area and spot has returned        History of Present Illness: Carina Carroll is a 63 y.o. female is a new patient to my clinic, but has been previously evaluated by Dr. Romi Alvarez on 12/11/19. She presents today for a total body skin examination and evaluation of a lesion on torso and nose. Patient report the lesion on her Rt torso has been present for several months. It recently became pruritic and she scratched it and it bled. She denies prior treatment to the lesion. She is also concerned about the AK on her nose that was treated by Dr. Romi Alvarez in December. She reports the area blistered, but the lesion never resolved. She describes it as scaly and rough. No bleeding or tenderness reported. Past Dermatologic History:  Personal history of non melanoma skin cancer: no (AK treated with cryotherapy)  Personal history of melanoma: no  Personal history of abnormal/dysplastic moles: no  Personal history of tanning bed use current: no  Personal history of tanning bed use: No  Personal history of blistering sunburns: No  Personal history of extensive sun exposure: Yes  Burns easily: Yes  Practicing sun protective habits:  Yes using  sunscreen SPF  48 or higher    Social History:   Occupation Current or Former: retired   Marital status:   Smoking Status: Never smoker  Children: Yes 1 Son  1 Daughter     Type of outdoor activities if any : Walking       Family Skin Disease History:  Patient denies  a family history of non melanoma skin cancer. Patient denies  a family history of abnormal moles  Patient denies  an immediate family history of melanoma.      Past Medical History:  Past Medical History:   Diagnosis Date    Anxiety     Broken ankle 11-1-2011  Hypertension     Osteopenia     Reflux        Past Surgical History:  Past Surgical History:   Procedure Laterality Date    BREAST ENHANCEMENT SURGERY  1987    BREAST ENHANCEMENT SURGERY      silicone    CHOLECYSTECTOMY  6/17/2016    laparoscopic    COLPOSCOPY      DILATION AND CURETTAGE OF UTERUS  9/20/13    EYE SURGERY  2006    both lens replaced    GALLBLADDER SURGERY      UPPER GASTROINTESTINAL ENDOSCOPY  5/19/11    colonoscopy       Past Family History:  Family History   Problem Relation Age of Onset    Cancer Mother 58        shoulder    Diabetes Father     Other Brother         sinus infection went to his brain    No Known Problems Paternal Grandfather     No Known Problems Paternal Grandmother     No Known Problems Maternal Grandmother     No Known Problems Maternal Grandfather     No Known Problems Sister     No Known Problems Brother     No Known Problems Maternal Uncle     No Known Problems Paternal Uncle     No Known Problems Paternal Aunt     No Known Problems Daughter     No Known Problems Son        Allergies: Allergies   Allergen Reactions    Latex Other (See Comments)     Yeast infection    Codeine Nausea And Vomiting       Current Medications:  Current Outpatient Medications   Medication Sig Dispense Refill    amLODIPine (NORVASC) 5 MG tablet TAKE 1 TABLET BY MOUTH DAILY 30 tablet 11     No current facility-administered medications for this visit. Review of Systems:  Constitutional: No fevers, chills or recent illness. Skin: Skin:As per HPI AND otherwise no new, bleeding or symptomatic skin lesions      Objective: There were no vitals filed for this visit.   Physical Examination:  General: alert, comfortable, no apparent distress, well-appearing  Psych: alert, oriented and pleasant  Neuro: oriented to person, place, and time  Skin: Areas examined: head including face, lips, conjunctiva and lids, neck, hair/scalp, chest, including breasts and axilla, abdomen, back, buttocks, right upper extremity, left upper extremity, right lower extremity, left lower extremity, left hand, right hand, digits and nails, Right foot, Left foot, toe nails and patient declined genital exam      All areas examined were within normal limits except those listed below with the appropriate assessment and plan    Assessment and Plan (with relevant objective exam findings):     1. Inflamed seborrheic keratosis AND Other specified erythematous condition (L53.8)  Location: Rt torso  Objective findings: Waxy, keratotic tan  papules with erythema    Symptoms: itching and bleeding     Due to irritation and symptoms this is causing as described the decision was made that this was medically necessary to treat today. Discussed treatment options including Cryotherapy, shave removal, ED&C, or laser treatment with patient. After discussing options, the decision was made to treat the ISK's with cryotherapy. See procedure note below. 2. Actinic Keratosis    Objective findings: erythematous, gritty, scaly keratotic macule (s), papules with slight hyperkeratosis located on: Lt nasal alar groove and nasal dorsum  The patient was told that actinic keratosis(es) represent precancers of the skin that have a small chance of developing into squamous cell carcinoma. Discussed options for therapy and risks/benefits and alternatives with patient including Liquid nitrogen, photodynamic therapy, topical field therapy (fluorouracil, imiquimod, other), etc     Decision was made to do the following:  -LN2 to 1 lesion.  -Edu re: risk of blister formation, discomfort, scar, hypopigmentation. See procedure note below  Discussed wound care. -Reviewed sun protective behavior -- sun avoidance during the peak hours of the day, sun-protective clothing (including hat and sunglasses), sunscreen use (water resistant, broad spectrum, SPF at least 30, need for reapplication every 2 to 3 hours).          3. Actinodermatosis  In all photo-exposed areas there were numerous light brown, lacy, 3-6 mm macules and some mottled hypo- and hyperpigmentation. This was most prominent on the face, anterior chest, and shoulders. The patient was reassured that these changes do not require treatment, but indicated a significant amount of sun damage in the patient's past. The patient was briefly counseled on the importance of sun protective habits and encouraged to be seen for follow up evaluations in the future. ABCDEs of melanoma were reviewed. 4. Seborrheic Keratosis  Location: face, back, chest and arms  Objective: Waxy, stuck on keratotic brown and tan  papules. - Discussed the benign nature of these lesions and that there is no malignant potential.  Treatment is destructive or removal which would be considered cosmetic and not covered by insurance. Treatment today: reassurance. 5. Stucco Keratosis  Location: bilateral ankles  Objective findings: 1-2 mm keratotic papules with \"Yarsanism spire\" appearance     Discussed that these are benign, and may be treated with curettage or exfoliation, keratolytic creams such as those containing urea, salicylic acid, alpha hydroxy acids, or retinoids. After discussion, decision was made to start the following:  Patient likes her cream she has been using      6. Multiple benign melanocytic nevi   Location: Torso, extremities, buttocks and left 3rd toe  Objective findings: multiple brown/pink macules and papules without abnormal findings or concerning findings on exam and dermatoscopy    -Counseled the patient that these are benign and need no therapy. Observation for any changes recommended       A suspicious erythematous shiny patch with telangiectasias on Rt central chest (new). Likely ISK however superficial BCC is on the differential. Will re-evaluate at 2 months and determine if biopsy is indicated    - The importance of continued surveillance was discussed. Monthly self examinations were encouraged. - Signs of cutaneous malignancy were discussed and they were encouraged to contact me if they note any evolving, bleeding, painful or non-healing lesions. ABCDEs of melanoma also reviewed. - Photoprotection was encouraged and written material on sunscreen provided. Follow up:  Return visit in 2 months or as needed for change in condition. All questions addressed. Procedure:     Procedure: Cryotherapy  Location(s): Rt torso   Indication:  ISK  Total Number of Lesions:  1    Risks, benefits and alternatives were explained and verbal informed consent was obtained. The area was not photographed. The lesion was treated with cryotherapy, using liquid nitrogen, for 2 freeze cycles of approximately 10 seconds each. Skin was allowed to thaw completely before each cycle. The patient was advised to have wound examined if problems with healing occur, or evidence of infection develops. Wound care instructions were reviewed with the patient and She was advised that if the site is not completely resolved at follow-up, re-treatment with this or other method should be considered. The patient tolerated the procedure well without complications. Procedure: Cryotherapy  Location(s):  Rt nasal alar groove and nasal dorsum  Indication:  premalignant  Total Number of Lesions:  1    Risks, benefits and alternatives were explained and verbal informed consent was obtained. The area was not photographed. The lesion was treated with cryotherapy, using liquid nitrogen, for 2 freeze cycles of approximately 10 seconds each. Skin was allowed to thaw completely before each cycle. The patient was advised to have wound examined if problems with healing occur, or evidence of infection develops.      Wound care instructions were reviewed with the patient and She was advised that if the site is not completely resolved at follow-up, re-treatment with this or other method should be considered. The patient tolerated the procedure well without complications.               Sarah Hardin MD, MS

## 2020-10-20 NOTE — PATIENT INSTRUCTIONS
Cryosurgery (Liquid Nitrogen Treatment): Aftercare instructions    Cryosurgery is the use of a cold, liquefied gas to remove superficial skin growths such as warts and keratoses. This liquefied gas is cooled to around 300? below 0? F. When it comes in contact with a skin growth, the growth is frozen and should fall away or shrink. What to expect:  After treatment, your skin will become red and swollen and may develop a blister or scab. Healing takes 1 to 3 weeks, after which the skin may look perfectly normal or may be slightly lighter in color. Sometimes the growth will not disappear completely and multiple treatments may be needed. This is especially true for warts. Proper care after the procedure:    Clean the treated area with soap and water as you normally would. It is fine to get the area wet; however, be sure to blot it dry carefully and delicately.  If necessary, you can open the blister with a sterilized needle.  If a scab or small wound forms, apply a thin layer of Vaseline/Aquaphor ointment to the site twice daily.  If the site is prone to irritation, cover it with a bandage.  You will likely see a crust form after a few days. This crust will fall off on its own. Sunscreen information    1. Sunscreen should be broad-spectrum protection (protects against UVA and UVB rays), Sun Protection Factor (SPF) 50 or greater, and water-resistant. 2.  Apply the sunscreen to dry skin 15-30 minutes BEFORE going outdoors. Be sure to apply it generously to achieve the UV protection indicated on the product label. 3.  Re-apply sunscreen approximately every two hours or after swimming or sweating heavily according to the directions on the bottle. If the bottle specifies a lower water resistance time, then reapply according to those guidelines (ie water resistance of 60 minutes needs to be applied every 60 minutes). 4.  Skin cancer also can form on the lips.  To protect your lips, apply a lip balm or lipstick that contains sunscreen with an SPF of 50 or higher. 5.  There are many types of sunscreen. A.  Creams are best for dry skin and the face. Neutrogena ultra sheer dry touch can be nice for the face. B.  Gels are good for hairy areas, such as the scalp or male chest.    C.  Sticks are good to use around the eyes. D. Sprays are sometimes preferred by parents since they are easy to apply to children, however sprays are NOT generally recommended due to the inability to fully cover most areas adequately. If you are using these, make sure to use enough of these products to cover the entire surface area thoroughly and rub in with your hands after spraying. Do NOT inhale these products or apply near heat, open flame or while smoking. E.  There also are sunscreens made for specific purposes, such as for sensitive skin and babies. Aveeno and Neutrogena are two examples. F. Daily facial moisturizers with spf can also be helpful (Aveeno positively radiant, Aveeno smart essentials, Cetaphil with sunscreen, etc). 6.  Wear protective clothing, such as a long-sleeved shirt, pants, a wide-brimmed hat and sunglasses, where possible. There are even clothing lines that have special sun-protective clothing and hats such as [place] and [place] (coolibar. com). 7.  Seek shade when appropriate, remembering that the sun's rays are strongest between 10 a.m. and 2 p.m. If your shadow is shorter than you are, seek shade. 8.  Use extra caution near water, snow and sand as they reflect the damaging rays of the sun, which can increase your chance of sunburn. 9.  Get vitamin D safely through a healthy diet that may include vitamin supplements. Don't seek the sun. 10.  Avoid tanning beds. Ultraviolet light from the sun and tanning beds can cause skin cancer and wrinkling. If you want to look tan, consider using a self-tanning product, but continue to use sunscreen with it.       A-B-C-D-E guide for Melanoma     Characteristics of unusual moles that may indicate melanomas or other skin cancers follow the A-B-C-D-E guide developed by the [employer]at[employer]:  1. A is for asymmetrical shape. Look for moles with irregular shapes, such as two very different-looking halves. 2. B is for irregular border. Look for moles with irregular, notched or scalloped borders, these may be characteristics of melanomas. 3. C is for changes in color. Look for growths that have many colors or an uneven distribution of color. 4. D is for diameter. Look for new growth in a mole larger than about 1/4 inch (6 millimeters) or about the size of a pencil eraser. 5. E is for evolving. This is the most important one. Look for changes over time, such as a mole that grows in size or that changes color or shape. Yesy Forbes may also evolve to develop new signs and symptoms, such as new itchiness or bleeding. If any of your moles appear to be changing, see your doctor. Other suspicious changes in a mole may include: Scaly skin, itching, spreading of color from the mole into the surrounding skin, oozing or bleeding. Cancerous (malignant) moles vary greatly in appearance. Some may show all of the changes listed above, while others may have only one or two unusual characteristics. Please call and schedule an appointment if you have any concerns or questions. Skin Cancer Prevention: After Your Visit    Skin cancer is the abnormal growth of cells in the skin. It usually appears as a growth that changes in color, shape, or size. This can be a sore that does not heal or a change in a wart or a mole. Skin cancer is almost always curable when found early and treated. So it is important to see your doctor if you have any of these changes in your skin. Skin cancer is the most common type of cancer.  It often appears on areas of the body that have been exposed to the sun, such as the head, face, neck, back, chest, or shoulders. Follow-up care is a key part of your treatment and safety. Be sure to make and go to all appointments, and call your doctor if you are having problems. It's also a good idea to know your test results and keep a list of the medicines you take. How can you care for yourself at home?  - Wear a wide-brimmed hat and long sleeves and pants if you are going to be outdoors for a long time. - Avoid the sun between 10 a.m. and 4 p.m., which is the peak time for UV rays. - Wear sunscreen on exposed skin. Make sure the sunscreen blocks ultraviolet rays (both UVA and UVB) and has a sun protection factor (SPF) of at least 30. Use it every day, even when it is cloudy. Some doctors may recommend a higher SPF, such as 48.  - Do not use tanning booths or sunlamps. - Use lip balm or cream that has sun protection factor (SPF) to protect your lips from getting sunburned or getting cold sores. - Wear sunglasses that block UV rays. When should you call for help? Watch closely for changes in your health, and be sure to contact your doctor if:  - You are concerned about any problem areas on your skin. - You notice a change in a mole or skin growth. For example:  a. It gets bigger. b. It develops uneven borders. c. It gets thicker, raised, or worn down. d. It changes color. e. It starts to bleed easily.

## 2020-12-15 ENCOUNTER — OFFICE VISIT (OUTPATIENT)
Dept: DERMATOLOGY | Age: 65
End: 2020-12-15
Payer: COMMERCIAL

## 2020-12-15 VITALS — TEMPERATURE: 96.8 F

## 2020-12-15 PROCEDURE — 11103 TANGNTL BX SKIN EA SEP/ADDL: CPT | Performed by: DERMATOLOGY

## 2020-12-15 PROCEDURE — 17000 DESTRUCT PREMALG LESION: CPT | Performed by: DERMATOLOGY

## 2020-12-15 PROCEDURE — 11102 TANGNTL BX SKIN SINGLE LES: CPT | Performed by: DERMATOLOGY

## 2020-12-15 PROCEDURE — 17003 DESTRUCT PREMALG LES 2-14: CPT | Performed by: DERMATOLOGY

## 2020-12-15 PROCEDURE — 99214 OFFICE O/P EST MOD 30 MIN: CPT | Performed by: DERMATOLOGY

## 2020-12-15 PROCEDURE — 11301 SHAVE SKIN LESION 0.6-1.0 CM: CPT | Performed by: DERMATOLOGY

## 2020-12-15 NOTE — PROGRESS NOTES
those listed below with the appropriate assessment and plan    Assessment and Plan (with relevant objective exam findings):     1. Neoplasm uncertain behavior, skin  Location: upper central chest  Objective findings: atrophic erythematous patch with scalloped border and telangiectasias centrally  Ddx: Superficial BCC versus Tatum's versus BLK versus inflamed lentigo  Biopsy today. See procedure note below. The specimen was sent to pathology for diagnosis. We will call patient or send note on Mychart with biopsy results as soon as they are available, and discuss treatment options as needed. Wound care was discussed and written instructions also given to patient. 2. Neoplasm uncertain behavior, skin  Location: Rt upper chest medial  Objective findings: thin erythematous plaque  Ddx: BLK versus AK versus superficial BCC  Shave removal today. See procedure note below. The specimen was sent to pathology for diagnosis. We will call patient or send note on Mychart with biopsy results as soon as they are available, and discuss treatment options as needed. Wound care was discussed and written instructions also given to patient. 3. Neoplasm uncertain behavior, skin  Location: Rt upper chest lateral  Objective findings: 3 mm translucent papule with rim of erythema and telangiectasias  Ddx: BCC versus EIC versus  Shave removal today. See procedure note below. The specimen was sent to pathology for diagnosis. We will call patient or send note on Mychart with biopsy results as soon as they are available, and discuss treatment options as needed. Wound care was discussed and written instructions also given to patient. 4. Actinic Keratosis    Objective findings: erythematous, gritty, scaly keratotic papule on left nasal alar groove    The patient was told that actinic keratosis(es) represent precancers of the skin that have a small chance of developing into squamous cell carcinoma.     Discussed options for therapy and risks/benefits and alternatives with patient including Liquid nitrogen, photodynamic therapy, topical field therapy (fluorouracil, imiquimod, other), etc     Decision was made to do the following:  -LN2 to 2 lesions.  -Edu re: risk of blister formation, discomfort, scar, hypopigmentation. See procedure note below  -Edu re: relationship with chronic cumulative sun exposure, low premalignant potential.   Discussed wound care. -Reviewed sun protective behavior -- sun avoidance during the peak hours of the day, sun-protective clothing (including hat and sunglasses), sunscreen use (water resistant, broad spectrum, SPF at least 30, need for reapplication every 2 to 3 hours). 5. Actinodermatosis  In all photo-exposed areas there were numerous light brown, lacy, 3-6 mm macules and some mottled hypo- and hyperpigmentation. This was most prominent on the face, anterior chest, and shoulders. The patient was reassured that these changes do not require treatment, but indicated a significant amount of sun damage in the patient's past. The patient was briefly counseled on the importance of sun protective habits and encouraged to be seen for follow up evaluations in the future. ABCDEs of melanoma were reviewed. Follow up:  Return visit pending biopsy results or as needed for change in condition. All questions addressed. Procedure:     Procedure:  (BIOPSY BY SHAVE TECHNIQUE) ([id number], +[id number])  Location:  Upper central chest  Indication: Neoplasm uncertain behavior, skin    Size:  1 cm    Risks, benefits, and alternatives were explained and verbal informed consent was obtained. The area was photographed with patient's permission. The area was cleaned with alcohol and infiltrated with 1% lidocaine with epinephrine. After adequate analgesia had been confirmed, the lesion was then biopsied by shave technique using a dermablade. Hemostasis was achieved with aluminum chloride.  Vaseline or antibiotic ointment and sterile dressing were applied and wound care instructions were given verbally and in writing. The patient tolerated the procedure well with no complications. The patient will be notified by mail or telephone of pathology results and is instructed to call us if they have not received notification within two weeks. PROCEDURE: REMOVAL BY SHAVE TECHNIQUE    Location:  Rt upper chest medial  Indication: Neoplasm uncertain behavior, skin    Size:  6 mm    Risks, benefits and alternatives were explained and verbal informed consent was obtained. The area was photographed with the patient's permission The area was cleaned with alcohol and infiltrated with 1% lidocaine with epinephrine. After adequate analgesia had been confirmed, the lesion was then removed by shave technique using a dermablade. Hemostasis was achieved with aluminum chloride. Vaseline ointment and sterile dressing were applied and wound care instructions were given verbally and in writing. The patient tolerated the procedure well with no complications. The patient will be notified by mail or telephone of pathology results and was instructed to call if he has not received notification within two weeks. CPT code for procedure:  Locations:  0.6 to 1.0 cm  -  08886        PROCEDURE: REMOVAL BY SHAVE TECHNIQUE     Location:     Rt upper chest lateral  Indication:    Neoplasm uncertain behavior, skin  Size: 3 mm    Risks, benefits and alternatives were explained and verbal informed consent was obtained. The area was photographed with the patient's permission The area was cleaned with alcohol and infiltrated with 1% lidocaine with epinephrine. After adequate analgesia had been confirmed, the lesion was then removed by shave technique using a dermablade. Hemostasis was achieved with aluminum chloride. Vaseline ointment and sterile dressing were applied and wound care instructions were given verbally and in writing.  The patient tolerated the procedure well with no complications. The patient will be notified by mail or telephone of pathology results and was instructed to call if he has not received notification within two weeks.     CPT code for procedure:  Locations:  <.5 cm  -  Jess Chung MD. MS

## 2020-12-15 NOTE — PATIENT INSTRUCTIONS
 Clean the treated area with soap and water as you normally would. It is fine to get the area wet; however, be sure to blot it dry carefully and delicately.  If necessary, you can open the blister with a sterilized needle.  If a scab or small wound forms, apply a thin layer of Vaseline/Aquaphor ointment to the site twice daily.  If the site is prone to irritation, cover it with a bandage.  You will likely see a crust form after a few days. This crust will fall off on its own.

## 2020-12-17 LAB — DERMATOLOGY PATHOLOGY REPORT: ABNORMAL

## 2020-12-21 ENCOUNTER — PROCEDURE VISIT (OUTPATIENT)
Dept: DERMATOLOGY | Age: 65
End: 2020-12-21
Payer: COMMERCIAL

## 2020-12-21 VITALS — TEMPERATURE: 97.5 F

## 2020-12-21 PROCEDURE — 99214 OFFICE O/P EST MOD 30 MIN: CPT | Performed by: DERMATOLOGY

## 2020-12-21 PROCEDURE — 17262 DSTRJ MAL LES T/A/L 1.1-2.0: CPT | Performed by: DERMATOLOGY

## 2020-12-21 RX ORDER — FLUOROURACIL 50 MG/G
CREAM TOPICAL
Qty: 40 G | Refills: 1 | Status: SHIPPED | OUTPATIENT
Start: 2020-12-21 | End: 2021-02-26 | Stop reason: ALTCHOICE

## 2020-12-21 NOTE — PATIENT INSTRUCTIONS
5. Do everything possible to stay out of the sun. Wear sunscreen with SPF 50 or higher each day and long sleeves and a hat when you go outside. 6. Be sure to wash your hands thoroughly after each application so you will not get any in your eyes, nose, etc.  Keep the packets out of reach of children. 7. Return 2 months after your treatment is complete for your doctor to evaluate the areas. You should also get a skin check every 6 to 12 months as determined by your doctor to check for any new lesions. AREAS TO TREAT:  Mid chest spot    Call the clinic at  for an appointment or with any questions or concerns. Watch the wound for signs of infection which includes: redness spreading from the wound, pus like drainage, pain in the area or if the area becomes warm to the touch. If these occur, please call the clinic. Clinic phone number-       Vinegar Water Soaks  2 tablespoon white vinegar  1 cup water  Mix ingredients together. Soak a soft cotton cloth or gauze in the solution. Wring out cloth and apply to the areas for about 15 minutes. Repeat this daily. Do not \"double dip\" the cloth in the solution, it will be contaminated. Place the solution in the refrigerator until next use.  It will stay good for one week

## 2020-12-21 NOTE — PROGRESS NOTES
Patient's Name: Sg Kowalski  MRN: <[id number]>  YOB: 1955  Date of Visit: 12/21/2020  Primary Care Provider: Lyndsey Briseno DO  Referring Provider: No ref. provider found    Subjective:   ED&C and rash    History of Present Illness: Sg Kowalski is a 63 y.o. female Patient is an established patient who presents for [facility] and concern for a new rash. They were last seen in clinic on 12/15/20. At their last visit she had 3 biopsies performed. She is concerned about a rash she developed after her biopsies. The rash is located on upper chest. It is at the site of the tegader,  Patient reports area intensely pruritic and burns. She has never experienced a similar rash. Denies rashes on any other part of her body. Past medical/surgical/family history reviewed with no changes since last visit on 12/15/20    Allergies: Allergies   Allergen Reactions    Latex Other (See Comments)     Yeast infection    Codeine Nausea And Vomiting       Current Medications:  Current Outpatient Medications   Medication Sig Dispense Refill    amLODIPine (NORVASC) 5 MG tablet TAKE 1 TABLET BY MOUTH DAILY 30 tablet 11     No current facility-administered medications for this visit. Review of Systems:  Constitutional: No fevers, chills or recent illness. feels well   Skin: Skin:As per HPI AND otherwise no new, bleeding or symptomatic skin lesions      Objective:     Vitals:    12/21/20 2232   Temp: 97.5 °F (36.4 °C)   TempSrc: Temporal       DIAGNOSIS:   A. UPPER CENTRAL CHEST-   Actinic Keratosis   There are atypical keratinocytes within the basal layer. The dermis shows elastosis and chronic inflammatory   reaction but there is no invasive malignancy. B. RIGHT UPPER CHEST MEDIAL-   Lichenoid keratosis   The epidermis is focally thinned with overlying   parakeratosis and scattered colloid bodies. Noreene Shouts is an   underlying bandlike mononuclear infiltrate.  There is no   cytologic atypia.    C. RIGHT UPPER CHEST LATERAL-   Basal Cell Carcinoma, Nodular Type   Within the dermis there are variably sized nests of basal   cell carcinoma. Adelso Pierce MD   Electronic Signature: Nannette Naylor 2020 09:28     Physical Examination:  General: alert, comfortable, no apparent distress, well-appearing  Psych: alert, oriented and pleasant  Neuro: oriented to person, place, and time  Skin: Areas examined: head including face, lips, conjunctiva and lids, neck, hair/scalp, chest, including breasts and axilla, left hand, right hand and digits and nails      All areas examined were within normal limits except those listed below with the appropriate assessment and plan    Assessment and Plan (with relevant objective exam findings):     1. Basal cell carcinoma  Location: Rt upper chest lateral  Objective findings: biopsy scar with erythema  Will treat with ED&C. See procedure below      2. Allergic contact dermatitis   Likely due to adhesives  Location: upper chest  Findings:  erytheatous papules and plaques, some vesicles excoriated, vesicular eruption with surrounding erythema    Discussed that this is likely due to the agent listed above, and can be managed with avoidance, and antiinflammatory therapy, sometimes with oral or topical steroids. - Will initiate treatment with Triamcinolone 0.1% ointment twice daily x 1-2 weeks      3. Actinic Keratosis  Location: Upper central chest  Objective findings: erythematous scaly plaque. Discussed treatment options and the treatment decided on is:  5-Fluorouracil twice daily x 4 weeks  Verbal and written instructions were provided. Will follow up 2 months after her completion of treatment. 4. Lichenoid keratosis  Location: Rt upper chest medial  Objective findings: biopsy wound  No further treatment needed. Follow up:  Return visit in 3 months or as needed for change in condition. All questions addressed.      Procedure:   PROCEDURE:  Electrodessication and Curettage    Location:  Rt upper chest lateral  Indication:  NBCC  Pretreatment Size:  5mm  Post Treatment Size:  11 mm    Verbal and written informed consent was obtained. Area The area was not photographed  The area was cleaned with alcohol and infiltrated with 1% lidocaine with epinephrine. After adequate analgesia had been confirmed, the area was treated with electrodessication followed by curettage. There were 3 passes. Hemostasis was achieved with aluminum chloride. Ointment and a sterile dressing were applied and written wound care instructions were given to the patient verbally and in writing. The patient tolerated the procedure well.              Asim Villanueva MD. MS

## 2021-02-24 ASSESSMENT — ENCOUNTER SYMPTOMS
DIARRHEA: 0
SHORTNESS OF BREATH: 0
VOMITING: 0
EYE REDNESS: 0
CONSTIPATION: 0
NAUSEA: 0

## 2021-02-24 NOTE — PROGRESS NOTES
02/26/21      Ismael Galan  Chief Complaint   Patient presents with    Blood Pressure Check   hpi:  Previous bps reviewed and in control. She is taking her amlodipine 5mg       Did have labs ordered in June  Labs show bad cholesteol 115, down from 120 a yr ago. Triglycerides essentially unchanged since last yr. I ordered an A1C put her in pre diabetes range Her blood sugar is below the diabetes level however, so she can continue watching her diet and decreasing simple sugars. rest of lab is good.  And we will do an A1C today  BP Readings from Last 3 Encounters:   02/26/21 132/72   07/15/19 (!) 144/78   06/11/19 130/70                  Past Medical History:   Diagnosis Date    Anxiety     Broken ankle 11-1-2011    Hypertension     Osteopenia     Reflux      Social History     Socioeconomic History    Marital status:      Spouse name: Not on file    Number of children: Not on file    Years of education: Not on file    Highest education level: Not on file   Occupational History    Not on file   Social Needs    Financial resource strain: Not hard at all   Unbxd insecurity     Worry: Never true     Inability: Never true   [employer] needs     Medical: No     Non-medical: No   Tobacco Use    Smoking status: Never Smoker    Smokeless tobacco: Never Used   Substance and Sexual Activity    Alcohol use: Yes     Comment: occasional    Drug use: No    Sexual activity: Yes     Partners: Male   Lifestyle    Physical activity     Days per week: Not on file     Minutes per session: Not on file    Stress: Not on file   Relationships    Social connections     Talks on phone: Not on file     Gets together: Not on file     Attends Roman [employer] service: Not on file     Active member of club or organization: Not on file     Attends meetings of clubs or organizations: Not on file     Relationship status: Not on file    Intimate partner violence     Fear of current or ex partner: Not on file Emotionally abused: Not on file     Physically abused: Not on file     Forced sexual activity: Not on file   Other Topics Concern    Not on file   Social History Narrative    Not on file           Review of Systems   Constitutional: Negative for unexpected weight change. HENT: Negative. Eyes: Negative for redness. Respiratory: Negative for shortness of breath. Cardiovascular: Negative for chest pain and leg swelling. Gastrointestinal: Negative for constipation, diarrhea, nausea and vomiting. Skin: Negative for pallor. Allergic/Immunologic: Negative for immunocompromised state. Psychiatric/Behavioral: Negative for confusion. All other systems reviewed and are negative. Current Outpatient Medications   Medication Sig Dispense Refill    amLODIPine (NORVASC) 5 MG tablet TAKE 1 TABLET BY MOUTH DAILY 30 tablet 11     No current facility-administered medications for this visit. Vitals:    02/26/21 0754   BP: 132/72   Pulse: 67   Temp: 97.3 °F (36.3 °C)   SpO2: 98%         Physical Exam  Physical Exam  Vitals signs and nursing note reviewed. Constitutional:       General: She is not in acute distress. Appearance: Normal appearance. She is well-developed. She is not ill-appearing, toxic-appearing or diaphoretic. HENT:      Head: Normocephalic and atraumatic. Eyes:      General: No scleral icterus. Right eye: No discharge. Left eye: No discharge. Conjunctiva/sclera: Conjunctivae normal.   Neck:      Musculoskeletal: Neck supple. No neck rigidity. Cardiovascular:      Rate and Rhythm: Normal rate and regular rhythm. Heart sounds: Normal heart sounds. Pulmonary:      Effort: Pulmonary effort is normal. No respiratory distress. Breath sounds: Normal breath sounds. No stridor. Musculoskeletal:      Right lower leg: No edema. Left lower leg: No edema. Skin:     General: Skin is warm and dry. Coloration: Skin is not pale.       Findings: No erythema or rash. Neurological:      Mental Status: She is alert and oriented to person, place, and time. Psychiatric:         Mood and Affect: Mood normal.         Behavior: Behavior normal.         Thought Content: Thought content normal.         Judgment: Judgment normal.          Diagnosis Orders   1. Essential hypertension     2. Impaired glucose tolerance, resolved, now normal  Hemoglobin A1C     Kathia Grover was seen today for blood pressure check. Diagnoses and all orders for this visit:    Essential hypertension    Impaired glucose tolerance, resolved, now normal  -     Hemoglobin A1C          An electronic signature was used to authenticate this note. This was dictated. Errors mightbe possible due to dictation.   --Jose Sinha, DO

## 2021-02-26 ENCOUNTER — OFFICE VISIT (OUTPATIENT)
Dept: FAMILY MEDICINE CLINIC | Age: 66
End: 2021-02-26
Payer: COMMERCIAL

## 2021-02-26 VITALS
OXYGEN SATURATION: 98 % | DIASTOLIC BLOOD PRESSURE: 72 MMHG | HEIGHT: 61 IN | SYSTOLIC BLOOD PRESSURE: 132 MMHG | WEIGHT: 147 LBS | BODY MASS INDEX: 27.75 KG/M2 | HEART RATE: 67 BPM | TEMPERATURE: 97.3 F

## 2021-02-26 DIAGNOSIS — I10 ESSENTIAL HYPERTENSION: Primary | ICD-10-CM

## 2021-02-26 DIAGNOSIS — R73.02 IMPAIRED GLUCOSE TOLERANCE: ICD-10-CM

## 2021-02-26 LAB — HBA1C MFR BLD: 5.6 %

## 2021-02-26 PROCEDURE — 83036 HEMOGLOBIN GLYCOSYLATED A1C: CPT | Performed by: FAMILY MEDICINE

## 2021-02-26 PROCEDURE — 99213 OFFICE O/P EST LOW 20 MIN: CPT | Performed by: FAMILY MEDICINE

## 2021-02-26 SDOH — ECONOMIC STABILITY: INCOME INSECURITY: HOW HARD IS IT FOR YOU TO PAY FOR THE VERY BASICS LIKE FOOD, HOUSING, MEDICAL CARE, AND HEATING?: NOT HARD AT ALL

## 2021-02-26 SDOH — ECONOMIC STABILITY: FOOD INSECURITY: WITHIN THE PAST 12 MONTHS, THE FOOD YOU BOUGHT JUST DIDN'T LAST AND YOU DIDN'T HAVE MONEY TO GET MORE.: NEVER TRUE

## 2021-02-26 SDOH — ECONOMIC STABILITY: TRANSPORTATION INSECURITY
IN THE PAST 12 MONTHS, HAS THE LACK OF TRANSPORTATION KEPT YOU FROM MEDICAL APPOINTMENTS OR FROM GETTING MEDICATIONS?: NO

## 2021-02-26 ASSESSMENT — PATIENT HEALTH QUESTIONNAIRE - PHQ9
SUM OF ALL RESPONSES TO PHQ QUESTIONS 1-9: 0
SUM OF ALL RESPONSES TO PHQ9 QUESTIONS 1 & 2: 0
SUM OF ALL RESPONSES TO PHQ QUESTIONS 1-9: 0
SUM OF ALL RESPONSES TO PHQ QUESTIONS 1-9: 0
1. LITTLE INTEREST OR PLEASURE IN DOING THINGS: 0

## 2021-02-26 NOTE — PATIENT INSTRUCTIONS
Your fasting blood sugar should be below 100. If it is between 100-125 that is considered high and known as prediabetes, or  impaired glucose tolerance. If your blood sugar is too high, go to diabetes. org for a diabetes prevention diet. The A1c shows your average blood sugar over the previous 3 months. You do not have to be fasting to get it drawn. If it is 5.6 or below it means your blood sugar is in the normal range (yours today is 5.4)  If between 5.7 and 6.5 it is impaired glucose tolerance. If it is above 6.5 it is consistent with the diagnosis of  diabetes.

## 2021-04-07 ENCOUNTER — OFFICE VISIT (OUTPATIENT)
Dept: DERMATOLOGY | Age: 66
End: 2021-04-07
Payer: COMMERCIAL

## 2021-04-07 VITALS — TEMPERATURE: 98.3 F

## 2021-04-07 DIAGNOSIS — L81.4 SOLAR LENTIGINOSIS: ICD-10-CM

## 2021-04-07 DIAGNOSIS — L81.0 POST-INFLAMMATORY HYPERPIGMENTATION: ICD-10-CM

## 2021-04-07 DIAGNOSIS — Z92.21 STATUS POST CHEMOTHERAPY: ICD-10-CM

## 2021-04-07 DIAGNOSIS — L57.0 ACTINIC KERATOSIS: Primary | ICD-10-CM

## 2021-04-07 PROCEDURE — 99213 OFFICE O/P EST LOW 20 MIN: CPT | Performed by: DERMATOLOGY

## 2021-04-07 NOTE — PATIENT INSTRUCTIONS
Sunscreen information    1. Sunscreen should be broad-spectrum protection (protects against UVA and UVB rays), Sun Protection Factor (SPF) 50 or greater, and water-resistant. 2.  Apply the sunscreen to dry skin 15-30 minutes BEFORE going outdoors. Be sure to apply it generously to achieve the UV protection indicated on the product label. 3.  Re-apply sunscreen approximately every two hours or after swimming or sweating heavily according to the directions on the bottle. If the bottle specifies a lower water resistance time, then reapply according to those guidelines (ie water resistance of 60 minutes needs to be applied every 60 minutes). 4.  Skin cancer also can form on the lips. To protect your lips, apply a lip balm or lipstick that contains sunscreen with an SPF of 50 or higher. 5.  There are many types of sunscreen. A.  Creams are best for dry skin and the face. Neutrogena ultra sheer dry touch can be nice for the face. B.  Gels are good for hairy areas, such as the scalp or male chest.    C.  Sticks are good to use around the eyes. D. Sprays are sometimes preferred by parents since they are easy to apply to children, however sprays are NOT generally recommended due to the inability to fully cover most areas adequately. If you are using these, make sure to use enough of these products to cover the entire surface area thoroughly and rub in with your hands after spraying. Do NOT inhale these products or apply near heat, open flame or while smoking. E.  There also are sunscreens made for specific purposes, such as for sensitive skin and babies. Aveeno and Neutrogena are two examples. F. Daily facial moisturizers with spf can also be helpful (Aveeno positively radiant, Aveeno smart essentials, Cetaphil with sunscreen, etc). 6.  Wear protective clothing, such as a long-sleeved shirt, pants, a wide-brimmed hat and sunglasses, where possible.  There are even clothing lines that have special sun-protective clothing and hats such as Coolibar and [place] ([employer]gent [employer]). 7.  Seek shade when appropriate, remembering that the sun's rays are strongest between 10 a.m. and 2 p.m. If your shadow is shorter than you are, seek shade. 8.  Use extra caution near water, snow and sand as they reflect the damaging rays of the sun, which can increase your chance of sunburn. 9.  Get vitamin D safely through a healthy diet that may include vitamin supplements. Don't seek the sun. 10.  Avoid tanning beds. Ultraviolet light from the sun and tanning beds can cause skin cancer and wrinkling. If you want to look tan, consider using a self-tanning product, but continue to use sunscreen with it. Skin Cancer Prevention: After Your Visit    Skin cancer is the abnormal growth of cells in the skin. It usually appears as a growth that changes in color, shape, or size. This can be a sore that does not heal or a change in a wart or a mole. Skin cancer is almost always curable when found early and treated. So it is important to see your doctor if you have any of these changes in your skin. Skin cancer is the most common type of cancer. It often appears on areas of the body that have been exposed to the sun, such as the head, face, neck, back, chest, or shoulders. Follow-up care is a key part of your treatment and safety. Be sure to make and go to all appointments, and call your doctor if you are having problems. It's also a good idea to know your test results and keep a list of the medicines you take. How can you care for yourself at home?  - Wear a wide-brimmed hat and long sleeves and pants if you are going to be outdoors for a long time. - Avoid the sun between 10 a.m. and 4 p.m., which is the peak time for UV rays. - Wear sunscreen on exposed skin. Make sure the sunscreen blocks ultraviolet rays (both UVA and UVB) and has a sun protection factor (SPF) of at least 30.  Use it every day, even

## 2021-04-07 NOTE — PROGRESS NOTES
examined: head including face, lips, conjunctiva and lids, neck, hair/scalp, chest, including breasts and axilla, left hand, right hand and digits and nails      All areas examined were within normal limits except those listed below with the appropriate assessment and plan    Assessment and Plan (with relevant objective exam findings):     1. Actinic keratosis s/p efudex (topical chemotherapy) resolved  Will continue to monitor. Risk factors for conditions such as nevi, skin cancers, photo-aging and skin damage including genetics and UV radiation from the sun and tanning beds was discussed. Recommended sun avoidance, use of protective clothing and broad spectrum sunscreen containing avobenzone, titanium dioxide, or zinc oxide with SPF 50 or higher and yearly full skin exams with a dermatologist.  Encouraged self skin checks each month, and return to clinic sooner than a year if any new, changing, or concerning spots are identified. 2. Post-inflammatory hyperpigmentation  Location: central chest  Findings: hyperpigmented patch    Discussed that this is due to the efudex and inflammation and will likely fade away with time. 3. Solar Lentigo  Location: sun exposed areas, most prominently on the dorsal hands, face, neck and upper chest      Objective: Numerous lacy brown macules ranging in size from 2-10 mm diameter. The lentigines seen today are benign in character, caused by the sun, and do not require treatment. However, they do occasionally transform into a malignancy, so the patient needs to monitor for changes. They were educated on what a suspicious change would include, change in size or color. Follow up:  Return visit in 1 year or as needed for change in condition. All questions addressed.      Procedure:   No procedure performed          Jahaira Zuniga MD. MS

## 2021-06-09 RX ORDER — AMLODIPINE BESYLATE 5 MG/1
TABLET ORAL
Qty: 30 TABLET | Refills: 11 | Status: SHIPPED | OUTPATIENT
Start: 2021-06-09 | End: 2022-07-08 | Stop reason: SDUPTHER

## 2021-06-09 NOTE — TELEPHONE ENCOUNTER
2/26/2021  Future Appointments   Date Time Provider Ankit Soto   7/2/2021 10:20 AM DO Marina Marshall

## 2021-07-02 ENCOUNTER — OFFICE VISIT (OUTPATIENT)
Dept: FAMILY MEDICINE CLINIC | Age: 66
End: 2021-07-02
Payer: COMMERCIAL

## 2021-07-02 VITALS
BODY MASS INDEX: 27.94 KG/M2 | HEIGHT: 61 IN | WEIGHT: 148 LBS | HEART RATE: 65 BPM | DIASTOLIC BLOOD PRESSURE: 86 MMHG | SYSTOLIC BLOOD PRESSURE: 124 MMHG | OXYGEN SATURATION: 98 %

## 2021-07-02 DIAGNOSIS — Z00.00 ANNUAL PHYSICAL EXAM: Primary | ICD-10-CM

## 2021-07-02 DIAGNOSIS — Z12.11 SCREEN FOR COLON CANCER: ICD-10-CM

## 2021-07-02 LAB
A/G RATIO: 2 (ref 1.1–2.2)
ALBUMIN SERPL-MCNC: 4.7 G/DL (ref 3.4–5)
ALP BLD-CCNC: 91 U/L (ref 40–129)
ALT SERPL-CCNC: 38 U/L (ref 10–40)
ANION GAP SERPL CALCULATED.3IONS-SCNC: 13 MMOL/L (ref 3–16)
AST SERPL-CCNC: 22 U/L (ref 15–37)
BILIRUB SERPL-MCNC: 0.5 MG/DL (ref 0–1)
BUN BLDV-MCNC: 17 MG/DL (ref 7–20)
CALCIUM SERPL-MCNC: 9.6 MG/DL (ref 8.3–10.6)
CHLORIDE BLD-SCNC: 102 MMOL/L (ref 99–110)
CHOLESTEROL, TOTAL: 228 MG/DL (ref 0–199)
CO2: 24 MMOL/L (ref 21–32)
CREAT SERPL-MCNC: 0.8 MG/DL (ref 0.6–1.2)
GFR AFRICAN AMERICAN: >60
GFR NON-AFRICAN AMERICAN: >60
GLOBULIN: 2.3 G/DL
GLUCOSE BLD-MCNC: 100 MG/DL (ref 70–99)
HCT VFR BLD CALC: 40.2 % (ref 36–48)
HDLC SERPL-MCNC: 54 MG/DL (ref 40–60)
HEMOGLOBIN: 13.5 G/DL (ref 12–16)
LDL CHOLESTEROL CALCULATED: 140 MG/DL
MCH RBC QN AUTO: 30.6 PG (ref 26–34)
MCHC RBC AUTO-ENTMCNC: 33.5 G/DL (ref 31–36)
MCV RBC AUTO: 91.4 FL (ref 80–100)
PDW BLD-RTO: 13.9 % (ref 12.4–15.4)
PLATELET # BLD: 302 K/UL (ref 135–450)
PMV BLD AUTO: 7.6 FL (ref 5–10.5)
POTASSIUM SERPL-SCNC: 4.7 MMOL/L (ref 3.5–5.1)
RBC # BLD: 4.4 M/UL (ref 4–5.2)
SODIUM BLD-SCNC: 139 MMOL/L (ref 136–145)
TOTAL PROTEIN: 7 G/DL (ref 6.4–8.2)
TRIGL SERPL-MCNC: 168 MG/DL (ref 0–150)
VLDLC SERPL CALC-MCNC: 34 MG/DL
WBC # BLD: 4.8 K/UL (ref 4–11)

## 2021-07-02 PROCEDURE — 36415 COLL VENOUS BLD VENIPUNCTURE: CPT | Performed by: FAMILY MEDICINE

## 2021-07-02 PROCEDURE — 99396 PREV VISIT EST AGE 40-64: CPT | Performed by: FAMILY MEDICINE

## 2021-07-02 NOTE — PROGRESS NOTES
2021    Inocente Kern (:  1955) is a 61 y.o. female, here for a preventive medicine evaluation. Chief Complaint   Patient presents with    Annual Exam        Diagnosis Orders   1. Annual physical exam  CBC    Comprehensive Metabolic Panel    Lipid Panel   2. Screen for colon cancer  External Referral To Gastroenterology     Danae Baldwin was seen today for annual exam.    Diagnoses and all orders for this visit:    Annual physical exam  -     CBC  -     Comprehensive Metabolic Panel  -     Lipid Panel    Screen for colon cancer  -     External Referral To Gastroenterology          Patient Active Problem List   Diagnosis    Personal history of pulmonary embolism    PMB (postmenopausal bleeding)    Endometrial polyp    Vulvar itching    Lesion of labia, left    Dermatosis, chronic inflammatory    Lichen planus suspected    Hypertension    Calculus of gallbladder with acute on chronic cholecystitis without obstruction    Actinic keratosis    Multiple benign melanocytic nevi       Review of Systems   Constitutional: Negative for unexpected weight change. HENT: Negative. Eyes: Negative for redness. Respiratory: Negative for shortness of breath. Cardiovascular: Negative for chest pain and leg swelling. Gastrointestinal: Negative for constipation, diarrhea, nausea and vomiting. Skin: Negative for pallor. Allergic/Immunologic: Negative for immunocompromised state. Psychiatric/Behavioral: Negative for confusion. Prior to Visit Medications    Medication Sig Taking?  Authorizing Provider   amLODIPine (NORVASC) 5 MG tablet TAKE 1 TABLET BY MOUTH EVERY DAY Yes Das Tyler, DO        Allergies   Allergen Reactions    Latex Other (See Comments)     Yeast infection    Codeine Nausea And Vomiting       Past Medical History:   Diagnosis Date    Anxiety     Broken ankle 2011    Hypertension     Osteopenia     Reflux        Past Surgical History:   Procedure Laterality Date    BREAST ENHANCEMENT SURGERY  1987    BREAST ENHANCEMENT SURGERY      silicone    CHOLECYSTECTOMY  6/17/2016    laparoscopic    COLPOSCOPY      DILATION AND CURETTAGE OF UTERUS  9/20/13    EYE SURGERY  2006    both lens replaced    GALLBLADDER SURGERY      UPPER GASTROINTESTINAL ENDOSCOPY  5/19/11    colonoscopy       Social History     Socioeconomic History    Marital status:      Spouse name: Not on file    Number of children: Not on file    Years of education: Not on file    Highest education level: Not on file   Occupational History    Not on file   Tobacco Use    Smoking status: Never Smoker    Smokeless tobacco: Never Used   Vaping Use    Vaping Use: Never used   Substance and Sexual Activity    Alcohol use: Yes     Comment: occasional    Drug use: No    Sexual activity: Yes     Partners: Male   Other Topics Concern    Not on file   Social History Narrative    Not on file     Social Determinants of Health     Financial Resource Strain: Low Risk     Difficulty of Paying Living Expenses: Not hard at all   Food Insecurity: No Food Insecurity    Worried About [address] [place] [address] in the Last Year: Never true    Darrius of Food in the Last Year: Never true   Transportation Needs: No Transportation Needs    Lack of Transportation (Medical): No    Lack of Transportation (Non-Medical):  No   Physical Activity:     Days of Exercise per Week:     Minutes of Exercise per Session:    Stress:     Feeling of Stress :    Social Connections:     Frequency of Communication with Friends and Family:     Frequency of Social Gatherings with Friends and Family:     Attends [employer]ism Services:     Active Member of Clubs or Organizations:     Attends Club or Organization Meetings:     Marital Status:    Intimate Partner Violence:     Fear of Current or Ex-Partner:     Emotionally Abused:     Physically Abused:     Sexually Abused:        Family History   Problem Relation Age of Onset    Cancer Mother 58        shoulder    Diabetes Father     Other Brother         sinus infection went to his brain    No Known Problems Paternal Grandfather     No Known Problems Paternal Grandmother     No Known Problems Maternal Grandmother     No Known Problems Maternal Grandfather     No Known Problems Sister     No Known Problems Brother     No Known Problems Maternal Uncle     No Known Problems Paternal Uncle     No Known Problems Paternal Aunt     No Known Problems Daughter     No Known Problems Son            Vitals:    07/02/21 1010   BP: 124/86   Site: Left Upper Arm   Position: Sitting   Cuff Size: Medium Adult   Pulse: 65   SpO2: 98%   Weight: 148 lb (67.1 kg)   Height: 5' 1\" (1.549 m)     Estimated body mass index is 27.96 kg/m² as calculated from the following:    Height as of this encounter: 5' 1\" (1.549 m). Weight as of this encounter: 148 lb (67.1 kg). BP Readings from Last 5 Encounters:   07/02/21 124/86   02/26/21 132/72   07/15/19 (!) 144/78   06/11/19 130/70   07/06/18 124/69        Oriented to person, place, and time. HEENT:Normocephalic, atraumatic. No scleral icterus. Pupils normal  Heart: Regular Rate and Rhythm. No murmurs. Respirations: lungs clear to auscultation bilaterally. Abdomin: Bowel sounds present. no masses  Extremities: No peripheral edema. No erythema.         The 10-year ASCVD risk score (Gavino Osuna, et al., 2013) is: 6.3%    Values used to calculate the score:      Age: 61 years      Sex: Female      Is Non- : No      Diabetic: No      Tobacco smoker: No      Systolic Blood Pressure: 666 mmHg      Is BP treated: Yes      HDL Cholesterol: 55 mg/dL      Total Cholesterol: 202 mg/dL    Immunization History   Administered Date(s) Administered    COVID-19, Pfizer, PF, 30mcg/0.3mL 03/09/2021, 03/30/2021    Influenza Virus Vaccine 10/12/2013    Influenza, MDCK Quadv, IM, PF (Flucelvax 4 yrs and older) 10/05/2020    Influenza, MDCK Sunny Morales, with preserv IM (Flucelvax 4 yrs and older) 10/06/2019    Influenza, Quadv, IM, PF (6 mo and older Fluzone, Flulaval, Fluarix, and 3 yrs and older Afluria) 11/09/2017    Tdap (Boostrix, Adacel) 06/11/2019    Zoster Recombinant (Shingrix) 06/11/2018, 08/08/2018       Health Maintenance   Topic Date Due    Hepatitis C screen  Never done    HIV screen  Never done    Colon cancer screen colonoscopy  05/25/2021    Flu vaccine (1) 09/01/2021    Breast cancer screen  06/01/2022    Diabetes screen  02/26/2024    Cervical cancer screen  07/15/2024    Lipid screen  06/17/2025    DTaP/Tdap/Td vaccine (2 - Td or Tdap) 06/11/2029    Shingles Vaccine  Completed    COVID-19 Vaccine  Completed    Hepatitis A vaccine  Aged Out    Hepatitis B vaccine  Aged Out    Hib vaccine  Aged Out    Meningococcal (ACWY) vaccine  Aged Out    Pneumococcal 0-64 years Vaccine  Aged [employer] was used to authenticate this note. This note is dictated, errors are possible.   --Gab Garcia, DO on 7/2/2021 at 10:41 AM

## 2021-07-02 NOTE — PATIENT INSTRUCTIONS
dogs have high amounts of fat along with lots of sodium, which can increase the risk of high blood pressure. Heart attack and stroke are two common complications of diabetes, so keeping blood pressure in check is important. Grains   Best options: When choosing grains, make sure theyre whole. Decrease the amount of foods which contain flour. Whole grains such as wild rice, quinoa, and whole grain breads and cereals contain fiber, which is beneficial for digestive health, but often the grains flour products raise your blood sugar and when your blood sugar returns to normal, it can trigger the desire to eat again.   Worst options: Refined white flour doesnt contain the same health benefits as whole grains. Processed foods made with white flour include breakfast cereals, white bread, and pastries, cookies, muffins, pretzels, crackers, so avoid these options. Also try to steer clear of white rice and pasta. Dairy   Best options: With only 6 to 8 grams of carbohydrates in a serving, plain nonfat [place] yogurt is a healthy and versatile dairy option. You can add berries and enjoy it for dessert or breakfast; you can use it in recipes as a replacement for sour cream, which is high in saturated fat. Skim milk.  Worst options: Avoid all full-fat dairy products and especially packaged chocolate milk, as it also has added sugar. Avoid yogurts with added sugar. Vegetables   Best options: Non-starchy vegetables such as leafy greens, broccoli, cauliflower, asparagus, and carrots are low in carbohydrates and high in fiber and other nutrients, Peter Andrews says. You can eat non-starchy vegetables in abundance  half of your plate should be filled with these veggies. If youre craving mashed potatoes, give mashed cauliflower a try.  Worst options: Stick to small portions of starchy vegetables such as corn, potatoes, and peas. These items are nutritious, but should be eaten in moderation.  The ADA groups them with grains because of their high carb content. Fruit   Best options: Fresh fruit can conquer your craving for sweets while providing antioxidants and fiber. Berries are a great option because recommended portion sizes are typically generous, which may leave you feeling more satisfied   Worst options: Avoid added sugar by eliminating fruits canned in syrup, and be aware that dried fruits have a very high sugar concentration. Also, fruit juices should be consumed rarely as theyre high in sugar and dont contain the same nutrients as whole fruit. Fats   Best options: Some types of fat actually help protect your heart. Choose the monounsaturated fats found in avocados, almonds, olives, and pecans or the polyunsaturated fats found in walnuts and sunflower oil, which can help to lower bad cholesterol.  Worst options: Saturated fats increase bad cholesterol, so limit butter, cheese, gravy, and fried foods. Keep calories from animal sources of saturated fat to less than 10 percent of your total daily intake. Trans fats are even worse than saturated fats, so avoid them completely. Look for the term hydrogenated on labels of processed foods such as packaged snacks, baked goods, and crackers.  For more help losing weight:         Keep track of what you eat everyday :      Lose it! gennaro for the smart phone, or loseit. com or [employer] are free online tools you can use to track your daily intake.     The online tools will help you establish your goals for weight loss and how much you should eat a day to accomplish the goal.       A BMI (body mass index) of 25 to 29 is considered overweight, a BMI of 30 and above is considered obese. BMI is found with your vitals on your After Visit Summary. In addition to the above diet info,    Iincrease the amount of fruit and vegetables you eat each day. Ideally you are eating at least 5 servings of fruit and or vegetables a day.        Drink at least 50 ounces of water a day.      Avoid or decrease processed food, fast food, and junk food.     Begin to exercise 15 minutes three times a week doing cardiovascular exercise.     Don't quit. It can take 12 weeks to break old habits before you feel like you are in a new routine. Then you have to live your new lifestyle. Bj Kelly Once you are adjusted to your new habits you will not have to keep recording your daily intake, you should have a good idea of what is a normal amount for you to eat each day.     If you are obese, have elevated blood sugar, heart disease or a medical condition that is worsened by excess weight, I can refer you to a [employer] for education, usually covered by insurance.      Your fasting blood sugar should be below 100. If it is between 100-125 that is considered high and known as prediabetes, or  impaired glucose tolerance. If your blood sugar is too high, go to diabetes. org for a diabetes prevention diet. The A1c shows your average blood sugar over the previous 3 months. It is sometimes ordered if your blood sugar is elevated  You do not have to be fasting to get it drawn. If it is 5.6 or below it means your blood sugar is in the normal range  If between 5.7 and 6.5 it is impaired glucose tolerance. If it is above 6.5 it is consistent with the diagnosis of  diabetes. Decrease high carb foods if your blood sugar is high. High carbohydrate foods are:  Breads, muffins, rolls, and bagels  Pasta, rice, corn, and grains  Potatoes, sweet potatoes  Legumes: peas beans lentils. Milk ( almond milk ok)  Most fruit except berries  Cake cookies pies ice cream candy etc.  Juices, soda, sweetened ice tea  Beer. Cholesterol, the ideal numbers explained: Total cholesterol should be below 200  The triglycerides should be below 150  The HDL, the good cholesterol should be above 40  The LDL, the bad cholesterol should be below 100.  Although it can be as high as 130 if no risk factors for heart disease or no diabetes. Improve your cholesterol profile:     Cardiovascular exercise helps. Start with 15 minutes three times a week and the work up to at least 30 minutes 5 days a week. Exercise at a level that you can carry on a conversation during. Loose extra pounds. Pay attention to your serving sides and avoid eating second helpings at meals. Significantly decrease the amount of processed food, junk food, and fast food that you eat. Decrease animal sources of saturated fats, and completely avoid and eliminate  hydrogenated oils and trans fats. Check the Food Label on the food you eat and avoid foods that have hydrogenated vegetable oils in them. That includes bakery products. Drink skim milk which contains no fat. Increase the amount of fresh food that you cook yourself. Eat at least five servings of fruits and vegetables a day. Increase the portion of your plate that contains the fruit and vegetables to at least 50%,-70%,  and decrease the amount of starches like potatoes, rice, pasta to no more than 25% of your plate. Increase your fiber intake. Fiber is found in fruits and vegetables as well as whole grains, oatmeal,  and beans. A diet with at least 5 servings of fruits and vegetables should give you about 10-20grams of fiber daily. Switch to monounsaturated fats like olive oil. Fat from olives, avocados, coconut, or other nuts is okay. Add baked or grilled fish to you diet at least 1-2 times a week. Learn more about cholesterol on the Internet. Check out these resources:    American Heart Association   Heart. [address]:   "[employer]    Triglycerides can be lowered without medication by exercising, and loosing weight and eating a diet lower in carbohydrates especially from flour sources,  and avoiding simple sugars. The good cholesterol is HDL. In order to increase the good cholesterol, increasing cardiovascular exercise helps.  Avoid hydrogenated oils (trans fats) in processed, bakery,  and junk food. Use monounsaturated fats such as olive oil can help raise the good cholesterol. For your weight, exercise 30 minutes 5 times weekly and improve the types of food you eat:    Protein   Best options: The American Diabetes Association (ADA) recommends lean proteins low in saturated fat, like fish or turkey. Aim for two servings of seafood each week; some fish, like salmon, have the added benefit of containing heart healthy omega-3 fats. For a vegetarian protein source, experiment with the wide variety of beans. Nuts, which are protein and healthy fats powerhouses, are also a choice.  Worst options: Processed deli meats and hot dogs have high amounts of fat along with lots of sodium, which can increase the risk of high blood pressure. Heart attack and stroke are two common complications of diabetes, so keeping blood pressure in check is important. Grains   Best options: When choosing grains, make sure theyre whole. Decrease the amount of foods which contain flour. Whole grains such as wild rice, quinoa, and whole grain breads and cereals contain fiber, which is beneficial for digestive health, but often the grains flour products raise your blood sugar and when your blood sugar returns to normal, it can trigger the desire to eat again.   Worst options: Refined white flour doesnt contain the same health benefits as whole grains. Processed foods made with white flour include breakfast cereals, white bread, and pastries, cookies, muffins, pretzels, crackers, so avoid these options. Also try to steer clear of white rice and pasta. Dairy   Best options: With only 6 to 8 grams of carbohydrates in a serving, plain nonfat [place] yogurt is a healthy and versatile dairy option. You can add berries and enjoy it for dessert or breakfast; you can use it in recipes as a replacement for sour cream, which is high in saturated fat. Skim milk.    Worst options: Avoid all full-fat dairy products and especially packaged chocolate milk, as it also has added sugar. Avoid yogurts with added sugar. Vegetables   Best options: Non-starchy vegetables such as leafy greens, broccoli, cauliflower, asparagus, and carrots are low in carbohydrates and high in fiber and other nutrients, Lucy Foreman says. You can eat non-starchy vegetables in abundance  half of your plate should be filled with these veggies. If youre craving mashed potatoes, give mashed cauliflower a try.  Worst options: Stick to small portions of starchy vegetables such as corn, potatoes, and peas. These items are nutritious, but should be eaten in moderation. The ADA groups them with grains because of their high carb content. Fruit   Best options: Fresh fruit can conquer your craving for sweets while providing antioxidants and fiber. Berries are a great option because recommended portion sizes are typically generous, which may leave you feeling more satisfied   Worst options: Avoid added sugar by eliminating fruits canned in syrup, and be aware that dried fruits have a very high sugar concentration. Also, fruit juices should be consumed rarely as theyre high in sugar and dont contain the same nutrients as whole fruit. Fats   Best options: Some types of fat actually help protect your heart. Choose the monounsaturated fats found in avocados, almonds, olives, and pecans or the polyunsaturated fats found in walnuts and sunflower oil, which can help to lower bad cholesterol.  Worst options: Saturated fats increase bad cholesterol, so limit butter, cheese, gravy, and fried foods. Keep calories from animal sources of saturated fat to less than 10 percent of your total daily intake. Trans fats are even worse than saturated fats, so avoid them completely. Look for the term hydrogenated on labels of processed foods such as packaged snacks, baked goods, and crackers.    For more help losing weight:     [employer] Keep track of what you eat everyday :      Lose it! gennaro for the smart phone, or loseit. com or [employer] are free online tools you can use to track your daily intake.     The online tools will help you establish your goals for weight loss and how much you should eat a day to accomplish the goal.       A BMI (body mass index) of 25 to 29 is considered overweight, a BMI of 30 and above is considered obese. BMI is found with your vitals on your After Visit Summary. In addition to the above diet info,    Iincrease the amount of fruit and vegetables you eat each day. Ideally you are eating at least 5 servings of fruit and or vegetables a day.     Drink at least 50 ounces of water a day.     Avoid or decrease processed food, fast food, and junk food.     Begin to exercise 15 minutes three times a week doing cardiovascular exercise.     Don't quit. It can take 12 weeks to break old habits before you feel like you are in a new routine. Then you have to live your new lifestyle. Asael Meneses Once you are adjusted to your new habits you will not have to keep recording your daily intake, you should have a good idea of what is a normal amount for you to eat each day.     If you are obese, have elevated blood sugar, heart disease or a medical condition that is worsened by excess weight, I can refer you to a [employer] for education, usually covered by insurance.   

## 2021-07-06 NOTE — RESULT ENCOUNTER NOTE
Blood count is normal electrolytes and liver and kidney function are good. Blood sugar is 100 which is down from 107 a year ago. Cholesterol is higher than 1 year ago with the bad cholesterol LDL being 140. I recommend following the dietary advice in the after visit summary and rechecking in 1 year.

## 2021-07-28 ENCOUNTER — HOSPITAL ENCOUNTER (OUTPATIENT)
Dept: MAMMOGRAPHY | Age: 66
Discharge: HOME | End: 2021-07-28
Payer: COMMERCIAL

## 2021-07-28 DIAGNOSIS — Z12.31 SCREENING MAMMOGRAM, ENCOUNTER FOR: ICD-10-CM

## 2021-07-28 PROCEDURE — 77063 BREAST TOMOSYNTHESIS BI: CPT

## 2021-07-29 ENCOUNTER — TELEPHONE (OUTPATIENT)
Dept: MAMMOGRAPHY | Age: 66
End: 2021-07-29

## 2021-08-11 ENCOUNTER — OFFICE VISIT (OUTPATIENT)
Dept: DERMATOLOGY | Age: 66
End: 2021-08-11
Payer: COMMERCIAL

## 2021-08-11 VITALS — TEMPERATURE: 97.3 F

## 2021-08-11 DIAGNOSIS — L57.0 ACTINIC KERATOSIS: Primary | ICD-10-CM

## 2021-08-11 DIAGNOSIS — L81.0 POST-INFLAMMATORY HYPERPIGMENTATION: ICD-10-CM

## 2021-08-11 PROCEDURE — 99213 OFFICE O/P EST LOW 20 MIN: CPT | Performed by: DERMATOLOGY

## 2021-08-11 NOTE — PROGRESS NOTES
Patient's Name: Tracy Mckeon  MRN: <[id number]>  YOB: 1955  Date of Visit: 8/11/2021  Primary Care Provider: Soila Gray DO  Referring Provider: No ref. provider found    Subjective:     Chief Complaint   Patient presents with    Lesion(s)     nose, rough dry        History of Present Illness: Tracy Mckeon a 61 y.o. female is a follow up patient to my practice. They were last seen in clinic on 4/7/21         She is concerned about the following spot/s that she would like checked:       Signs and symptoms: scaly. Location: left side of nose. Duration: This condition has gone on for a few years  Current treatment: None  Previous treatment: cryotherapy    She denies any other new, changing, non healing or bleeding lesions. The chest lesions healed well. Past medical/surgical/family history reviewed with no changes since last visit on 4/7/21        Allergies: Allergies   Allergen Reactions    Latex Other (See Comments)     Yeast infection    Codeine Nausea And Vomiting       Current Medications:  Current Outpatient Medications   Medication Sig Dispense Refill    amLODIPine (NORVASC) 5 MG tablet TAKE 1 TABLET BY MOUTH EVERY DAY 30 tablet 11     No current facility-administered medications for this visit. Review of Systems:  Constitutional: No fevers, chills or recent illness.    Skin: Skin:As per HPI AND otherwise no new, bleeding or symptomatic skin lesions      Objective:     Vitals:    08/11/21 1013   Temp: 97.3 °F (36.3 °C)       Physical Examination:  General: alert, comfortable, no apparent distress, well-appearing  Psych: alert, oriented and pleasant  Neuro: oriented to person, place, and time  Skin: Areas examined: head including face, lips, conjunctiva and lids, neck, hair/scalp, chest, including breasts and axilla, left hand, right hand and digits and nails      All areas examined were within normal limits except those listed below with the appropriate assessment and

## 2021-08-11 NOTE — PATIENT INSTRUCTIONS
Instructions for use of Fluorouracil    INDICATION: This cream is used to treat pre-cancerous spots (aka actinic keratoses), early basal and squamous cell skin cancers,  and warts. DIRECTIONS:  1. Fluorouracil cream comes in a tube of cream.    2. Apply it only to the area(s) indicated by your physician. Spread a thin layer of the cream over the entire area. Dont contact the eyes, or mouth. Do not cover the treated areas with a bandage or dressing unless your doctor tells you to do so. TWICE DAILY FOR 2 WEEKS, then take ONE WEEK OFF, THEN you may REPEAT TO COMPLETE 3 WEEKS    3. Some redness and irritation is expected (SUNBURN LIKE). If your skin gets too uncomfortable or painful, it is okay to stop for one week. Start again the next week until the treatment course is completed. 4. The redness will gradually fade over the following weeks, and a topical steroid may be prescribed to speed healing. CALL THE OFFICE IF NEEDED    5. Do everything possible to stay out of the sun. Wear sunscreen with SPF 50 or higher each day and long sleeves and a hat when you go outside. 6. Be sure to wash your hands thoroughly after each application so you will not get any in your eyes, nose, etc.  Keep the packets out of reach of children. 7. Return 2 months after your treatment is complete for your doctor to evaluate the areas. You should also get a skin check every 6 to 12 months as determined by your doctor to check for any new lesions. AREAS TO TREAT:  Left side of nose    Plan for about a 2-4 week course, but this can vary depending on thickness of lesions and may be longer course for thicker lesion areas. Side effects to expect: redness, crustiness, pain, tenderness, oozing of lesions. When you've achieved this goal response, STOP the cream. Best to stay out of sun when treating and healing; advise wearing sunscreen if possible.       I would take it to the point you get uncomfortable and then ok to stop

## 2021-08-31 PROBLEM — K57.90 DIVERTICULOSIS: Status: ACTIVE | Noted: 2021-08-31

## 2021-09-07 ENCOUNTER — HOSPITAL ENCOUNTER (OUTPATIENT)
Dept: GENERAL RADIOLOGY | Age: 66
Discharge: HOME | End: 2021-09-07
Payer: COMMERCIAL

## 2021-09-07 ENCOUNTER — OFFICE VISIT (OUTPATIENT)
Dept: FAMILY MEDICINE CLINIC | Age: 66
End: 2021-09-07
Payer: COMMERCIAL

## 2021-09-07 VITALS
HEIGHT: 61 IN | WEIGHT: 149 LBS | OXYGEN SATURATION: 97 % | RESPIRATION RATE: 14 BRPM | BODY MASS INDEX: 28.13 KG/M2 | DIASTOLIC BLOOD PRESSURE: 74 MMHG | SYSTOLIC BLOOD PRESSURE: 124 MMHG | HEART RATE: 70 BPM

## 2021-09-07 DIAGNOSIS — Z01.818 PRE-OP EXAM: ICD-10-CM

## 2021-09-07 DIAGNOSIS — Z01.818 PREOP EXAMINATION: Primary | ICD-10-CM

## 2021-09-07 DIAGNOSIS — H02.9 EYELID ABNORMALITY: Primary | ICD-10-CM

## 2021-09-07 LAB
A/G RATIO: 1.9 (ref 1.1–2.2)
ALBUMIN SERPL-MCNC: 4.5 G/DL (ref 3.4–5)
ALP BLD-CCNC: 93 U/L (ref 40–129)
ALT SERPL-CCNC: 38 U/L (ref 10–40)
ANION GAP SERPL CALCULATED.3IONS-SCNC: 13 MMOL/L (ref 3–16)
APTT: 34.9 SEC (ref 26.2–38.6)
AST SERPL-CCNC: 21 U/L (ref 15–37)
BASOPHILS ABSOLUTE: 0 K/UL (ref 0–0.2)
BASOPHILS RELATIVE PERCENT: 0.6 %
BILIRUB SERPL-MCNC: <0.2 MG/DL (ref 0–1)
BUN BLDV-MCNC: 18 MG/DL (ref 7–20)
CALCIUM SERPL-MCNC: 10 MG/DL (ref 8.3–10.6)
CHLORIDE BLD-SCNC: 102 MMOL/L (ref 99–110)
CO2: 25 MMOL/L (ref 21–32)
CREAT SERPL-MCNC: 0.7 MG/DL (ref 0.6–1.2)
EOSINOPHILS ABSOLUTE: 0.1 K/UL (ref 0–0.6)
EOSINOPHILS RELATIVE PERCENT: 1.1 %
GFR AFRICAN AMERICAN: >60
GFR NON-AFRICAN AMERICAN: >60
GLOBULIN: 2.4 G/DL
GLUCOSE BLD-MCNC: 111 MG/DL (ref 70–99)
HCT VFR BLD CALC: 37.1 % (ref 36–48)
HEMOGLOBIN: 12.5 G/DL (ref 12–16)
INR BLD: 0.89 (ref 0.88–1.12)
LYMPHOCYTES ABSOLUTE: 2.6 K/UL (ref 1–5.1)
LYMPHOCYTES RELATIVE PERCENT: 41.4 %
MCH RBC QN AUTO: 30.2 PG (ref 26–34)
MCHC RBC AUTO-ENTMCNC: 33.9 G/DL (ref 31–36)
MCV RBC AUTO: 89.3 FL (ref 80–100)
MONOCYTES ABSOLUTE: 0.3 K/UL (ref 0–1.3)
MONOCYTES RELATIVE PERCENT: 4.5 %
NEUTROPHILS ABSOLUTE: 3.3 K/UL (ref 1.7–7.7)
NEUTROPHILS RELATIVE PERCENT: 52.4 %
PDW BLD-RTO: 13.5 % (ref 12.4–15.4)
PLATELET # BLD: 309 K/UL (ref 135–450)
PMV BLD AUTO: 7.8 FL (ref 5–10.5)
POTASSIUM SERPL-SCNC: 4 MMOL/L (ref 3.5–5.1)
PROTHROMBIN TIME: 10 SEC (ref 9.9–12.7)
RBC # BLD: 4.15 M/UL (ref 4–5.2)
SODIUM BLD-SCNC: 140 MMOL/L (ref 136–145)
TOTAL PROTEIN: 6.9 G/DL (ref 6.4–8.2)
WBC # BLD: 6.3 K/UL (ref 4–11)

## 2021-09-07 PROCEDURE — 36415 COLL VENOUS BLD VENIPUNCTURE: CPT | Performed by: NURSE PRACTITIONER

## 2021-09-07 PROCEDURE — 93000 ELECTROCARDIOGRAM COMPLETE: CPT | Performed by: NURSE PRACTITIONER

## 2021-09-07 PROCEDURE — 71046 X-RAY EXAM CHEST 2 VIEWS: CPT

## 2021-09-07 PROCEDURE — 99213 OFFICE O/P EST LOW 20 MIN: CPT | Performed by: NURSE PRACTITIONER

## 2021-09-07 ASSESSMENT — PATIENT HEALTH QUESTIONNAIRE - PHQ9
SUM OF ALL RESPONSES TO PHQ QUESTIONS 1-9: 0
2. FEELING DOWN, DEPRESSED OR HOPELESS: 0
SUM OF ALL RESPONSES TO PHQ QUESTIONS 1-9: 0
SUM OF ALL RESPONSES TO PHQ QUESTIONS 1-9: 0
1. LITTLE INTEREST OR PLEASURE IN DOING THINGS: 0
SUM OF ALL RESPONSES TO PHQ9 QUESTIONS 1 & 2: 0

## 2021-09-07 NOTE — PROGRESS NOTES
Sheba Scale  YOB: 1955    10/5/21    Vitals:    09/07/21 1327   BP: 124/74   Site: Right Upper Arm   Pulse: 70   Resp: 14   SpO2: 97%   Weight: 149 lb (67.6 kg)   Height: 5' 1\" (1.549 m)      Wt Readings from Last 2 Encounters:   09/07/21 149 lb (67.6 kg)   07/02/21 148 lb (67.1 kg)     BP Readings from Last 3 Encounters:   09/07/21 124/74   07/02/21 124/86   02/26/21 132/72        Chief Complaint   Patient presents with   [facility] Pre-op Exam     eye surgery [facility] on 10/05/2021     Allergies   Allergen Reactions    Latex Other (See Comments)     Yeast infection    Codeine Nausea And Vomiting     Current Outpatient Medications   Medication Sig Dispense Refill    amLODIPine (NORVASC) 5 MG tablet TAKE 1 TABLET BY MOUTH EVERY DAY 30 tablet 11     No current facility-administered medications for this visit. This patient presents to the office today for a preoperative consultation at the request of surgeon, Dr Vonnie Duvall who plans on performing brow lift bilat on October 5. The current problem began 1 months ago and has significantly worsened. Conservative therapy, including none, has failed. Planned anesthesia is General.  The patient has the following known anesthesia issues: none. Patient has a bleeding risk of : no recent abnormal bleeding. Patient does not have objection to receiving blood products if needed.     Patient Active Problem List   Diagnosis    Personal history of pulmonary embolism    PMB (postmenopausal bleeding)    Endometrial polyp    Vulvar itching    Lesion of labia, left    Dermatosis, chronic inflammatory    Lichen planus suspected    Hypertension    Calculus of gallbladder with acute on chronic cholecystitis without obstruction    Actinic keratosis    Multiple benign melanocytic nevi    Diverticulosis, moderate, colonoscopy 2021       Past Medical History:   Diagnosis Date    Anxiety     Broken ankle 11-1-2011    Hypertension     Osteopenia     Reflux Past Surgical History:   Procedure Laterality Date    BREAST ENHANCEMENT SURGERY  1987    BREAST ENHANCEMENT SURGERY      silicone    CHOLECYSTECTOMY  6/17/2016    laparoscopic    COLPOSCOPY      DILATION AND CURETTAGE OF UTERUS  9/20/13    EYE SURGERY  2006    both lens replaced    GALLBLADDER SURGERY      UPPER GASTROINTESTINAL ENDOSCOPY  5/19/11    colonoscopy     Family History   Problem Relation Age of Onset    Cancer Mother 58        shoulder    Diabetes Father     Other Brother         sinus infection went to his brain    No Known Problems Paternal Grandfather     No Known Problems Paternal Grandmother     No Known Problems Maternal Grandmother     No Known Problems Maternal Grandfather     No Known Problems Sister     No Known Problems Brother     No Known Problems Maternal Uncle     No Known Problems Paternal Uncle     No Known Problems Paternal Aunt     No Known Problems Daughter     No Known Problems Son      Social History     Socioeconomic History    Marital status:      Spouse name: Not on file    Number of children: Not on file    Years of education: Not on file    Highest education level: Not on file   Occupational History    Not on file   Tobacco Use    Smoking status: Never Smoker    Smokeless tobacco: Never Used   Vaping Use    Vaping Use: Never used   Substance and Sexual Activity    Alcohol use: Yes     Comment: occasional    Drug use: No    Sexual activity: Yes     Partners: Male   Other Topics Concern    Not on file   Social History Narrative    Not on file     Social Determinants of Health     Financial Resource Strain: Low Risk     Difficulty of Paying Living Expenses: Not hard at all   Food Insecurity: No Food Insecurity    Worried About Running Out of Food in the Last Year: Never true    Darrius of Food in the Last Year: Never true   Transportation Needs: No Transportation Needs    Lack of Transportation (Medical):  No    Lack of Transportation (Non-Medical): No   Physical Activity:     Days of Exercise per Week:     Minutes of Exercise per Session:    Stress:     Feeling of Stress :    Social Connections:     Frequency of Communication with Friends and Family:     Frequency of Social Gatherings with Friends and Family:     Attends Jewish Services:     Active Member of Clubs or Organizations:     Attends Club or Organization Meetings:     Marital Status:    Intimate Partner Violence:     Fear of Current or Ex-Partner:     Emotionally Abused:     Physically Abused:     Sexually Abused:        Review Of Systems    Skin: no abnormal pigmentation, rash, scaling, itching, masses, hair or nail changes  Eyes: negative  Ears/Nose/Throat: negative  Respiratory: negative  Cardiovascular: negative  Gastrointestinal: negative  Genitourinary: negative  Musculoskeletal: negative  Neurologic: negative  Psychiatric: negative  Hematologic/Lymphatic/Immunologic: negative  Endocrine: negative       Objective:      /74 (Site: Right Upper Arm)   Pulse 70   Resp 14   Ht 5' 1\" (1.549 m)   Wt 149 lb (67.6 kg)   LMP 01/01/1995   SpO2 97%   Breastfeeding No   BMI 28.15 kg/m²   General appearance - healthy, alert, no distress  Skin - Skin color, texture, turgor normal. No rashes or lesions. Head - Normocephalic. No masses, lesions, tenderness or abnormalities  Eyes - conjunctivae/corneas clear. PERRL, EOM's intact. Ears - External ears normal. Canals clear. TM's normal.  Nose/Sinuses - Nares normal. Septum midline. Mucosa normal. No drainage or sinus tenderness. Oropharynx - Lips, mucosa, and tongue normal. Teeth and gums normal.   Neck - Neck supple. No adenopathy. Thyroid symmetric, normal size,  Back - Back symmetric, no curvature. ROM normal. No CVA tenderness. Lungs - Percussion normal. Good diaphragmatic excursion. Lungs clear  Heart - Regular rate and rhythm, with no rub, murmur or gallop noted.   Abdomen - Abdomen soft,

## 2021-12-07 ENCOUNTER — OFFICE VISIT (OUTPATIENT)
Dept: DERMATOLOGY | Age: 66
End: 2021-12-07
Payer: COMMERCIAL

## 2021-12-07 VITALS — TEMPERATURE: 96.4 F

## 2021-12-07 DIAGNOSIS — L81.0 POST-INFLAMMATORY HYPERPIGMENTATION: ICD-10-CM

## 2021-12-07 DIAGNOSIS — L57.0 ACTINIC KERATOSIS TREATED WITH TOPICAL FLUOROURACIL (5FU): Primary | ICD-10-CM

## 2021-12-07 DIAGNOSIS — L57.8 ACTINODERMATOSIS: ICD-10-CM

## 2021-12-07 PROCEDURE — 1036F TOBACCO NON-USER: CPT | Performed by: DERMATOLOGY

## 2021-12-07 PROCEDURE — G8427 DOCREV CUR MEDS BY ELIG CLIN: HCPCS | Performed by: DERMATOLOGY

## 2021-12-07 PROCEDURE — 99214 OFFICE O/P EST MOD 30 MIN: CPT | Performed by: DERMATOLOGY

## 2021-12-07 PROCEDURE — G8484 FLU IMMUNIZE NO ADMIN: HCPCS | Performed by: DERMATOLOGY

## 2021-12-07 PROCEDURE — G8419 CALC BMI OUT NRM PARAM NOF/U: HCPCS | Performed by: DERMATOLOGY

## 2021-12-07 PROCEDURE — 3017F COLORECTAL CA SCREEN DOC REV: CPT | Performed by: DERMATOLOGY

## 2021-12-07 NOTE — PROGRESS NOTES
Patient's Name: Gisselle Churchill  MRN: <[id number]>  YOB: 1955  Date of Visit: 12/7/2021  Primary Care Provider: Breonna Segura DO  Referring Provider: No ref. provider found    Subjective:   Chief Complaint:   Follow-up (efudex going good )      History of Present Illness: Gisselle Churchill is a 61 y.o. female with h/o BCC and AKs who presents in clinic today for a follow up on AK treated with efudex. Last office visit: 8/11/21    At their last visit they were prescribed efudex to treat an AKs on Lt nasal ala and sidewall    Patient reports using the cream x 3 weeks, She reports crusting and scabbing at the site. She discontinued its use ~ 4 weeks ago. Denies any new, changing, bleeding or non healing lesions. 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 treated with ED&C on 12/21/21  (AKs on Rt nasal sidewall and ala, chest treated with efudex )  Personal history of melanoma: no  Personal history of abnormal/dysplastic moles: no  Personal history of tanning bed use current: no  Personal history of tanning bed use: No  Personal history of blistering sunburns: No  Personal history of extensive sun exposure: Yes  Burns easily: Yes  Practicing sun protective habits:  Yes using  sunscreen SPF  48 or higher      Past medical and surgical histories, current medications, allergies, social and family histories were reviewed with no change since the last visit        Allergies: Allergies   Allergen Reactions    Latex Other (See Comments)     Yeast infection    Codeine Nausea And Vomiting       Current Medications:  Current Outpatient Medications   Medication Sig Dispense Refill    amLODIPine (NORVASC) 5 MG tablet TAKE 1 TABLET BY MOUTH EVERY DAY 30 tablet 11     No current facility-administered medications for this visit. Review of Systems:  Constitutional: No fevers, chills or recent illness.   Skin: Skin:As per HPI AND otherwise no new, bleeding or symptomatic skin lesions      Objective:     Vitals:    12/07/21 1031   Temp: 96.4 °F (35.8 °C)   TempSrc: Skin       Physical Examination:  General: alert, comfortable, no apparent distress, well-appearing  Psych: alert, oriented and pleasant  Neuro: oriented to person, place, and time  Skin: Areas examined: head including face, lips, conjunctiva and lids, neck, hair/scalp, chest, including breasts and axilla, left hand, right hand and digits and nails      All areas examined were within normal limits except those listed below with the appropriate assessment and plan    Assessment and Plan (with relevant objective exam findings):     1. Actinic keratosis treated with topical fluorouracil  Location/objective findings: Lt nasal sidewall and ala with red patch  Recommended sun avoidance, use of protective clothing and broad spectrum sunscreen containing titanium dioxide, or zinc oxide with SPF 30 or higher and yearly full skin exams with a dermatologist.       2. Post-inflammatory hyperpigmentation  Location: Rt nasal sidewall and ala  Findings: hyperpigmented patch    Discussed that this is due to the underlying inflammation in the skin and if we can control the cause well, it should fade gradually with time    3. Actinodermatosis  In all photo-exposed areas there were numerous light brown, lacy, 3-6 mm macules and some mottled hypo- and hyperpigmentation. This was most prominent on the anterior chest    The patient was reassured that these changes do not require treatment, but indicated a significant amount of sun damage in the patient's past. The patient was briefly counseled on the importance of sun protective habits and encouraged to be seen for follow up evaluations in the future. ABCDEs of melanoma were reviewed. Follow up:  Return visit in may,as patient states she will have medicare then, or as needed for change in condition. All questions addressed.      Procedure:   No procedure performed          Gisselle Adhikari MD. MS

## 2021-12-21 ENCOUNTER — VIRTUAL VISIT (OUTPATIENT)
Dept: PRIMARY CARE CLINIC | Age: 66
End: 2021-12-21
Payer: COMMERCIAL

## 2021-12-21 DIAGNOSIS — J06.9 VIRAL URI WITH COUGH: Primary | ICD-10-CM

## 2021-12-21 PROCEDURE — 99213 OFFICE O/P EST LOW 20 MIN: CPT | Performed by: NURSE PRACTITIONER

## 2021-12-21 PROCEDURE — G8427 DOCREV CUR MEDS BY ELIG CLIN: HCPCS | Performed by: NURSE PRACTITIONER

## 2021-12-21 PROCEDURE — 3017F COLORECTAL CA SCREEN DOC REV: CPT | Performed by: NURSE PRACTITIONER

## 2021-12-21 ASSESSMENT — ENCOUNTER SYMPTOMS
SORE THROAT: 0
NAUSEA: 0
DIARRHEA: 0
SINUS PAIN: 0
SHORTNESS OF BREATH: 0
RHINORRHEA: 1
COUGH: 0
VOMITING: 0
SINUS PRESSURE: 0

## 2021-12-21 NOTE — PROGRESS NOTES
atraumatic. [] Abnormal   [x] Mouth/Throat: Mucous membranes are moist.     External Ears [x] Normal  [] Abnormal-     Neck: [x] No visualized mass     Pulmonary/Chest: [x] Respiratory effort normal.  [x] No visualized signs of difficulty breathing or respiratory distress        [] Abnormal-      Musculoskeletal:   [] Normal gait with no signs of ataxia         [x] Normal range of motion of neck        [] Abnormal-       Neurological:        [] No Facial Asymmetry (Cranial nerve 7 motor function) (limited exam to video visit)          [] No gaze palsy        [] Abnormal-         Skin:        [x] No significant exanthematous lesions or discoloration noted on facial skin         [] Abnormal-            Psychiatric:       [x] Normal Affect [] No Hallucinations        [] Abnormal-     Other pertinent observable physical exam findings-     ASSESSMENT/PLAN:    1. Viral URI with cough  Symptoms of ear fullness, runny nose mild productive cough clear/green x4 days taking over-the-counter medications with some avail. Patient denying fever and/or any other symptoms, eating and drinking well no change in activity had concerns about taking Mucinex DM.  -Advised patient signs and symptoms most likely viral in nature, okay to take Mucinex DM  -Discussed signs and symptoms/red flags and need for urgent/emergent care  -If symptoms worsen patient to follow-up in office/urgent care if symptoms become emergent she will follow up in emergency room. Home Care Instructions  Notify office if you do not improve or have worsening of condition.   Drink plenty of fluids and rest  Do not eat or drink after anyone  Do not share utensils  Practice good hand hygiene  May sleep with humidifier as needed  May take tylenol as needed for fever/pain)  Sore throat: gargle with warm salt water 3-4 times a day, you can use ice, warm chicken noodle soup, soft foods, popsicles, cough drops  Cough- suggest that airway irritation contributing to cough be relieved with oral hydration, warm fluids (eg, tea, chicken soup), honey (in children older than one year), or cough lozenges or hard candy (in children in whom they are not an aspiration risk) rather than OTC or prescription antitussives, antihistamines, expectorants, or mucolytics       Return if symptoms worsen or fail to improve. Bogdan Navarro, was evaluated through a synchronous (real-time) audio-video encounter. The patient (or guardian if applicable) is aware that this is a billable service. Verbal consent to proceed has been obtained within the past 12 months. The visit was conducted pursuant to the emergency declaration under the [address] [place] [address] authority and the [employer] Act. Patient identification was verified, and a caregiver was present when appropriate. The patient was located in a state where the provider was credentialed to provide care. Total time spent on this encounter: 20 min    --GURU Hough CNP on 12/21/2021 at 11:28 AM    An electronic signature was used to authenticate this note.

## 2022-04-13 ENCOUNTER — OFFICE VISIT (OUTPATIENT)
Dept: DERMATOLOGY | Age: 67
End: 2022-04-13
Payer: COMMERCIAL

## 2022-04-13 VITALS — TEMPERATURE: 98.4 F

## 2022-04-13 DIAGNOSIS — D48.5 NEOPLASM OF UNCERTAIN BEHAVIOR OF SKIN: ICD-10-CM

## 2022-04-13 DIAGNOSIS — L72.0 MILIA: ICD-10-CM

## 2022-04-13 DIAGNOSIS — L82.1 SEBORRHEIC KERATOSIS: Primary | ICD-10-CM

## 2022-04-13 DIAGNOSIS — L57.0 ACTINIC KERATOSIS: ICD-10-CM

## 2022-04-13 DIAGNOSIS — L81.4 SOLAR LENTIGINOSIS: ICD-10-CM

## 2022-04-13 DIAGNOSIS — D22.9 MULTIPLE BENIGN MELANOCYTIC NEVI: ICD-10-CM

## 2022-04-13 DIAGNOSIS — L57.8 ACTINODERMATOSIS: ICD-10-CM

## 2022-04-13 PROCEDURE — 3017F COLORECTAL CA SCREEN DOC REV: CPT | Performed by: DERMATOLOGY

## 2022-04-13 PROCEDURE — 17000 DESTRUCT PREMALG LESION: CPT | Performed by: DERMATOLOGY

## 2022-04-13 PROCEDURE — 11102 TANGNTL BX SKIN SINGLE LES: CPT | Performed by: DERMATOLOGY

## 2022-04-13 PROCEDURE — 1036F TOBACCO NON-USER: CPT | Performed by: DERMATOLOGY

## 2022-04-13 PROCEDURE — 17003 DESTRUCT PREMALG LES 2-14: CPT | Performed by: DERMATOLOGY

## 2022-04-13 PROCEDURE — G8419 CALC BMI OUT NRM PARAM NOF/U: HCPCS | Performed by: DERMATOLOGY

## 2022-04-13 PROCEDURE — 99214 OFFICE O/P EST MOD 30 MIN: CPT | Performed by: DERMATOLOGY

## 2022-04-13 PROCEDURE — G8427 DOCREV CUR MEDS BY ELIG CLIN: HCPCS | Performed by: DERMATOLOGY

## 2022-04-13 NOTE — PROGRESS NOTES
Patient's Name: Jean-Pierre Deras  MRN: [id number]  YOB: 1955  Date of Visit: 4/13/2022  Primary Care Provider: Govind Purcell DO  Referring Provider: No ref. provider found    Subjective:   Chief Complaint:   Skin Exam (h/o NMSC and AKs)      History of Present Illness: Jean-Pierre Deras is a 61 y.o. female with h/o NMSC and AKs who presents in clinic today for a TBSE and evaluation of rough lesions on Rt cheek. Last office visit: 12/7/21      Patient reports rough lesions on her Rt cheek. They have been present for a few months. Denies pain, bleeding or pruritus. Patient has many year history of multiple nevi on the trunk and extremities. Denies any recent new lesions. Denies any changes in size, color or shape. Denies any associated pain, pruritus or bleeding. 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 treated with ED&C on 12/21/21  (AKs on Rt nasal sidewall and ala, chest treated with efudex )  Personal history of melanoma: no  Personal history of abnormal/dysplastic moles: no  Personal history of tanning bed use current: no  Personal history of tanning bed use: No  Personal history of blistering sunburns: No  Personal history of extensive sun exposure: Yes  Burns easily: Yes  Practicing sun protective habits:  Yes using  sunscreen SPF  50 or higher      Past medical and surgical histories, current medications, allergies, social and family histories were reviewed with no change since the last visit        Allergies: Allergies   Allergen Reactions    Latex Other (See Comments)     Yeast infection    Codeine Nausea And Vomiting       Current Medications:  Current Outpatient Medications   Medication Sig Dispense Refill    amLODIPine (NORVASC) 5 MG tablet TAKE 1 TABLET BY MOUTH EVERY DAY 30 tablet 11     No current facility-administered medications for this visit. Review of Systems:  Constitutional: No fevers, chills or recent illness. Skin: Skin:As per HPI AND otherwise no new, bleeding or symptomatic skin lesions      Objective:     Vitals:    04/13/22 1115   Temp: 98.4 °F (36.9 °C)       Physical Examination:  General: alert, comfortable, no apparent distress, well-appearing  Psych: alert, oriented and pleasant  Neuro: oriented to person, place, and time  Skin: Areas examined: head including face, lips, conjunctiva and lids, neck, hair/scalp, chest, including breasts and axilla, abdomen, back, buttocks, right upper extremity, left upper extremity, right lower extremity, left lower extremity, left hand, right hand, digits and nails, Right foot, Left foot, toe nails and groin      All areas examined were within normal limits except those listed below with the appropriate assessment and plan    Assessment and Plan (with relevant objective exam findings):     1. Seborrheic Keratosis  Location: Rt cheek, torso and extremities  Objective: Waxy, stuck on keratotic brown  papules. -Reassurance provided to the patient regarding their chronic and benign nature. No treatment performed    2. Multiple benign melanocytic nevi   Location: face, torso, extremities, Rt 3rd toe and 2nd web space  Objective findings: multiple brown/pink macules and papules without abnormal findings or concerning findings on exam and dermatoscopy    -Counseled the patient that these are benign and need no therapy. Observation for any changes recommended     3. Actinic Keratosis    Objective findings: erythematous, gritty, scaly keratotic macule (s), papules with slight hyperkeratosis located on: Lt medial eyebrow and Rt upper cutaneous lip    The patient was told that actinic keratosis(es) represent precancers of the skin that have a small chance of developing into squamous cell carcinoma.     Discussed options for therapy and risks/benefits and alternatives with patient including Liquid nitrogen, photodynamic therapy, topical field therapy (fluorouracil, imiquimod, other), etc     Decision was made to do the following:  -LN2 to 2 lesions.  -Edu re: risk of blister formation, discomfort, scar, hypopigmentation. See procedure note below  -Edu re: relationship with chronic cumulative sun exposure, low premalignant potential.   Discussed wound care. -Reviewed sun protective behavior -- sun avoidance during the peak hours of the day, sun-protective clothing (including hat and sunglasses), sunscreen use (water resistant, broad spectrum, SPF at least 30, need for reapplication every 2 to 3 hours). 4. Neoplasm uncertain behavior, skin  Location: Inferior Rt breast  Objective findings: erythematous scaly papule with telangiectasias  Ddx: BLK vs BCC  Biopsy today. See procedure note below. The specimen was sent to pathology for diagnosis. We will call patient or send note on Mychart with biopsy results as soon as they are available, and discuss treatment options as needed. Wound care was discussed and written instructions also given to patient. 5. Solar Lentiginosis    Location: sun exposed areas, most prominently on the face, forearms, shoulders, upper chest and upper back      Objective: Numerous lacy brown macules ranging in size from 2-10 mm diameter. The lentigines seen today are benign in character, caused by the sun, and do not require treatment. However, they do occasionally transform into a malignancy, so the patient needs to monitor for changes. They were educated on what a suspicious change would include, change in size or color, and included education on the ABCDs of melanoma. 6. Actinodermatosis  In all photo-exposed areas there were numerous light brown, lacy, 3-6 mm macules and some mottled hypo- and hyperpigmentation. This was most prominent on the face, anterior chest, and shoulders.      The patient was reassured that these changes do not require treatment, but indicated a significant amount of sun damage in the patient's past. The patient was briefly counseled on the importance of sun protective habits and encouraged to be seen for follow up evaluations in the future. ABCDEs of melanoma were reviewed. 7. Milia  Location: forehead and lower eyelids  Objective: 1-2 mm Small firm white papules. -Reassurance, benign small cyst.  No treatment is necessary             Follow up:  Return visit pending biopsy results or as needed for change in condition. All questions addressed. Procedure:   Procedure: Cryotherapy  Location(s):  Lt medial eyebrow and Rt upper cutaneous lip  Indication: premalignant  Total Number of Lesions:  2    Risks, benefits and alternatives were explained and verbal informed consent was obtained. Each lesion was treated with cryotherapy, using liquid nitrogen, for 2 freeze cycles of approximately 3 seconds each. Skin was allowed to thaw completely before each cycle. The patient was advised to have wound examined if problems with healing occur, or evidence of infection develops. Wound care instructions were reviewed with the patient and She was advised that if the site is not completely resolved at follow-up, re-treatment with this or other method should be considered. The patient tolerated the procedure well without complications. Procedure:  (BIOPSY BY SHAVE TECHNIQUE) (80534)  Location:  Inferior Rt breast  Indication: Neoplasm uncertain behavior, skin      Risks, benefits, and alternatives were explained and verbal informed consent was obtained. The area was photographed with patient's permission. The area was cleaned with alcohol and infiltrated with 1% lidocaine with epinephrine. After adequate analgesia had been confirmed, the lesion was then biopsied by shave technique using a dermablade. Hemostasis was achieved with aluminum chloride. Vaseline or antibiotic ointment and sterile dressing were applied and wound care instructions were given verbally and in writing.  The patient tolerated the procedure well with no complications. The patient will be notified by mail or telephone of pathology results and is instructed to call us if they have not received notification within two weeks.           Nan Avila MD. MS

## 2022-04-13 NOTE — PATIENT INSTRUCTIONS
Wound care instructions    1. Keep bandage dry for 24 hrs. 2. After 24 hrs, clean with soap and water twice daily  3. Apply Vaseline or Aquaphor ointment twice daily and cover with small dressing or Band-Aid for 7-10 days. Studies show that wounds heal better when covered with ointment and a bandage. FREQUENTLY ASKED QUESTIONS:    Floydene Ada It is OK to get the wound wet when washing or bathing.  If bleeding should occur, apply firm direct pressure for 20 minutes CONTINUOUSLY. After 20 minutes, you may check to see if bleeding has stopped. If bleeding persists, please repeat CONTINUOUS PRESSURE for another 20 minutes WITHOUT LOOKING. o If bleeding still persists, call the office at [phone]   Signs of infection include increased redness extending over 1 centimeter from the wound, increased warmth, yellow to green purulent (pus-like) discharge, and significantly increased tenderness to the touch. If you have any concerns regarding infection or develop fevers or chills, please call [phone]   You will be contacted with the results of your procedure when we receive them, usually within two weeks. Please call if you have not heard from us. Cryosurgery (Liquid Nitrogen Treatment): Aftercare instructions    Cryosurgery is the use of a cold, liquefied gas to remove superficial skin growths such as warts and keratoses. This liquefied gas is cooled to around 300? below 0? F. When it comes in contact with a skin growth, the growth is frozen and should fall away or shrink. What to expect:  After treatment, your skin will become red and swollen and may develop a blister or scab. Healing takes 1 to 3 weeks, after which the skin may look perfectly normal or may be slightly lighter in color. Sometimes the growth will not disappear completely and multiple treatments may be needed. This is especially true for warts.      Proper care after the procedure:    Clean the treated area with soap and water as you normally would. It is fine to get the area wet; however, be sure to blot it dry carefully and delicately.  If necessary, you can open the blister with a sterilized needle.  If a scab or small wound forms, apply a thin layer of Vaseline/Aquaphor ointment to the site twice daily.  If the site is prone to irritation, cover it with a bandage.  You will likely see a crust form after a few days. This crust will fall off on its own. Skin Cancer Prevention: After Your Visit    Skin cancer is the abnormal growth of cells in the skin. It usually appears as a growth that changes in color, shape, or size. This can be a sore that does not heal or a change in a wart or a mole. Skin cancer is almost always curable when found early and treated. So it is important to see your doctor if you have any of these changes in your skin. Skin cancer is the most common type of cancer. It often appears on areas of the body that have been exposed to the sun, such as the head, face, neck, back, chest, or shoulders. Follow-up care is a key part of your treatment and safety. Be sure to make and go to all appointments, and call your doctor if you are having problems. It's also a good idea to know your test results and keep a list of the medicines you take. How can you care for yourself at home?  - Wear a wide-brimmed hat and long sleeves and pants if you are going to be outdoors for a long time. - Avoid the sun between 10 a.m. and 4 p.m., which is the peak time for UV rays. - Wear sunscreen on exposed skin. Make sure the sunscreen blocks ultraviolet rays (both UVA and UVB) and has a sun protection factor (SPF) of at least 30. Use it every day, even when it is cloudy. Some doctors may recommend a higher SPF, such as 48.  - Do not use tanning booths or sunlamps. - Use lip balm or cream that has sun protection factor (SPF) to protect your lips from getting sunburned or getting cold sores.   - Wear sunglasses that block UV rays.    When should you call for help? Watch closely for changes in your health, and be sure to contact your doctor if:  - You are concerned about any problem areas on your skin. - You notice a change in a mole or skin growth. For example:  a. It gets bigger. b. It develops uneven borders. c. It gets thicker, raised, or worn down. d. It changes color. e. It starts to bleed easily. Dr. Dipesh Mayo [employer] Safe Strategies:    1. Avoid the sun if possible especially between the hours of 10 am and 4 pm. That's when the sun's most harmful UV rays are hitting the earth. 2. Use protective clothing. Just like the right equipment helps you perform better in your sport, the proper clothing can do a lot to help us in our fight to prevent skin cancer. Thicker and darker clothing with a tighter weave will block more of the suns harmful rays, and it never wears off! Make wearing long sleeves, a broad brimmed hat and sun glasses part of your routine. 3. Use a broad spectrum sunscreen with SPF 30 or higher on all your exposed skin. Make sure the sunscreen has either titanium dioxide, and/or zinc oxide (preferred), or Avobenzone in it (check the active ingredients on the back of the bottle to make sure). If you are going to be in the water or sweating, make sure the sunscreen you choose is water resistant. Better sunscreens are available in [place] ([facility]) and [facility] like Tinosorb S (Bemotrizinol) and Tinosorb M ([address] [place] [address]), so if you are serious about sun protection and visit those areas, feel free to stock up. 4. Reapply the sunscreen after 2 hours or after swimming, sweating, or toweling off. We don't care about the brand, but some good brands to use are Cari Buchanan MD , [facility], and really just about anything with the above ingredients. Cetaphil oil control moisturizer with SPF 30 or 50 is a good one for the face. It is not water resistant but goes on really light and won't make you break out.     5. Use enough. One ounce of lotion, which is about the size of a golf ball, is the amount needed to cover the exposed parts of an average adult body. For sprays, apply until it \"glistens\" on skin (2 seconds of spray per arm, 4 seconds per leg, and 5 to 8 seconds each for the front torso and back). For sticks apply 3 to 4 passes back and forth per area. 6. Get regular check ups. Studies show that those who check their own skin every month, get checked by their spouse, partner, or significant other several times a year (we recommend you do this on your birthdays, anniversary, and big holidays), and get their yearly check by a board certified dermatologist have their skin cancers detected much earlier and have a much higher chance of cure than those who don't. A-B-C-D-E guide for Melanoma     Characteristics of unusual moles that may indicate melanomas or other skin cancers follow the A-B-C-D-E guide developed by the American Academy [employer]ermat[employer]:  2. A is for asymmetrical shape. Look for moles with irregular shapes, such as two very different-looking halves. 3. B is for irregular border. Look for moles with irregular, notched or scalloped borders, these may be characteristics of melanomas. 4. C is for changes in color. Look for growths that have many colors or an uneven distribution of color. 5. D is for diameter. Look for new growth in a mole larger than about 1/4 inch (6 millimeters) or about the size of a pencil eraser. 6. E is for evolving. This is the most important one. Look for changes over time, such as a mole that grows in size or that changes color or shape. Vickey Krishnamurthy may also evolve to develop new signs and symptoms, such as new itchiness or bleeding. If any of your moles appear to be changing, see your doctor. Other suspicious changes in a mole may include: Scaly skin, itching, spreading of color from the mole into the surrounding skin, oozing or bleeding.  Cancerous (malignant) moles vary greatly in appearance. Some may show all of the changes listed above, while others may have only one or two unusual characteristics. Please call and schedule an appointment if you have any concerns or questions.

## 2022-04-15 LAB — DERMATOLOGY PATHOLOGY REPORT: NORMAL

## 2022-04-26 ENCOUNTER — OFFICE VISIT (OUTPATIENT)
Dept: DERMATOLOGY | Age: 67
End: 2022-04-26
Payer: COMMERCIAL

## 2022-04-26 DIAGNOSIS — L81.4 SOLAR LENTIGINOSIS: ICD-10-CM

## 2022-04-26 DIAGNOSIS — L57.0 LICHENOID ACTINIC KERATOSIS: Primary | ICD-10-CM

## 2022-04-26 DIAGNOSIS — L57.0 ACTINIC KERATOSIS: ICD-10-CM

## 2022-04-26 PROCEDURE — 99213 OFFICE O/P EST LOW 20 MIN: CPT | Performed by: DERMATOLOGY

## 2022-04-26 PROCEDURE — 17000 DESTRUCT PREMALG LESION: CPT | Performed by: DERMATOLOGY

## 2022-04-26 PROCEDURE — 17003 DESTRUCT PREMALG LES 2-14: CPT | Performed by: DERMATOLOGY

## 2022-04-26 PROCEDURE — 3017F COLORECTAL CA SCREEN DOC REV: CPT | Performed by: DERMATOLOGY

## 2022-04-26 PROCEDURE — G8428 CUR MEDS NOT DOCUMENT: HCPCS | Performed by: DERMATOLOGY

## 2022-04-26 PROCEDURE — G8419 CALC BMI OUT NRM PARAM NOF/U: HCPCS | Performed by: DERMATOLOGY

## 2022-04-26 PROCEDURE — 1036F TOBACCO NON-USER: CPT | Performed by: DERMATOLOGY

## 2022-04-26 NOTE — PROGRESS NOTES
Patient's Name: Ana Welsh  MRN: [id number]  YOB: 1955  Date of Visit: 4/26/2022  Primary Care Provider: Luciano Goetz DO  Referring Provider: No ref. provider found    Subjective:   Chief Complaint:   No chief complaint on file. History of Present Illness: Ana Welsh is a 61 y.o. female who presents in clinic today for a follow up and re-evaluation on biopsy proven lichenoid AK. Last office visit: 4/13/22    At her last visit, she had a biopsy of a lesion on inferior Rt breast which revealed a lichenoid AK    Patient reports the site is healing well, however she still feels some roughness and scale. She denies any other new, changing or non healing lesions. Past medical and surgical histories, current medications, allergies, social and family histories were reviewed with no change since the last visit        Allergies: Allergies   Allergen Reactions    Latex Other (See Comments)     Yeast infection    Codeine Nausea And Vomiting       Current Medications:  Current Outpatient Medications   Medication Sig Dispense Refill    amLODIPine (NORVASC) 5 MG tablet TAKE 1 TABLET BY MOUTH EVERY DAY 30 tablet 11     No current facility-administered medications for this visit. Review of Systems:  Constitutional: No fevers, chills or recent illness. Skin: Skin:As per HPI AND otherwise no new, bleeding or symptomatic skin lesions      Objective: There were no vitals filed for this visit.     Physical Examination:  General: alert, comfortable, no apparent distress, well-appearing  Psych: alert, oriented and pleasant  Neuro: oriented to person, place, and time  Skin: Areas examined: head including face, lips, conjunctiva and lids, neck, hair/scalp, chest, including breasts and axilla, left hand, right hand and digits and nails      All areas examined were within normal limits except those listed below with the appropriate assessment and plan    Assessment and Plan (with relevant objective exam findings):     1. Lichenoid actinic keratosis  Location/objective findings: erythematous scaly papule  Will proceed with treating with cryotherapy. 2. Actinic Keratosis    Objective findings: erythematous, gritty, scaly keratotic macule (s), papules with slight hyperkeratosis located on: Medial inferior Rt breast    The patient was told that actinic keratosis(es) represent precancers of the skin that have a small chance of developing into squamous cell carcinoma. Discussed options for therapy and risks/benefits and alternatives with patient including Liquid nitrogen, photodynamic therapy, topical field therapy (fluorouracil, imiquimod, other), etc     Decision was made to do the following:  -LN2 to  lesions.  -Edu re: risk of blister formation, discomfort, scar, hypopigmentation. See procedure note below  -Edu re: relationship with chronic cumulative sun exposure, low premalignant potential.   Discussed wound care. -Reviewed sun protective behavior -- sun avoidance during the peak hours of the day, sun-protective clothing (including hat and sunglasses), sunscreen use (water resistant, broad spectrum, SPF at least 30, need for reapplication every 2 to 3 hours). 3. Solar Lentiginosis    Location: sun exposed areas, most prominently on the neck, shoulders and upper chest      Objective: Numerous lacy brown macules ranging in size from 2-10 mm diameter. The lentigines seen today are benign in character, caused by the sun, and do not require treatment. However, they do occasionally transform into a malignancy, so the patient needs to monitor for changes. They were educated on what a suspicious change would include, change in size or color, and included education on the ABCDs of melanoma. Follow up:  Patient is scheduled with a new dermatologist in June. All questions addressed.      Procedure:   Procedure: Cryotherapy  Location(s): inferior Rt  breast   Indication: premalignant  Total Number of Lesions:  2    Risks, benefits and alternatives were explained and verbal informed consent was obtained. Each lesion was treated with cryotherapy, using liquid nitrogen, for 2 freeze cycles of approximately 3 seconds each. Skin was allowed to thaw completely before each cycle. The patient was advised to have wound examined if problems with healing occur, or evidence of infection develops. Wound care instructions were reviewed with the patient and She was advised that if the site is not completely resolved at follow-up, re-treatment with this or other method should be considered. The patient tolerated the procedure well without complications.         Mitzi Anderson MD. MS

## 2022-07-08 ENCOUNTER — OFFICE VISIT (OUTPATIENT)
Dept: FAMILY MEDICINE CLINIC | Age: 67
End: 2022-07-08
Payer: MEDICARE

## 2022-07-08 VITALS
SYSTOLIC BLOOD PRESSURE: 150 MMHG | WEIGHT: 146 LBS | HEIGHT: 61 IN | BODY MASS INDEX: 27.56 KG/M2 | OXYGEN SATURATION: 99 % | HEART RATE: 75 BPM | DIASTOLIC BLOOD PRESSURE: 77 MMHG

## 2022-07-08 DIAGNOSIS — R73.9 HYPERGLYCEMIA: ICD-10-CM

## 2022-07-08 DIAGNOSIS — I10 UNCONTROLLED HYPERTENSION: ICD-10-CM

## 2022-07-08 DIAGNOSIS — Z00.00 WELCOME TO MEDICARE PREVENTIVE VISIT: Primary | ICD-10-CM

## 2022-07-08 PROCEDURE — 3017F COLORECTAL CA SCREEN DOC REV: CPT | Performed by: FAMILY MEDICINE

## 2022-07-08 PROCEDURE — 1123F ACP DISCUSS/DSCN MKR DOCD: CPT | Performed by: FAMILY MEDICINE

## 2022-07-08 PROCEDURE — G0402 INITIAL PREVENTIVE EXAM: HCPCS | Performed by: FAMILY MEDICINE

## 2022-07-08 RX ORDER — SCOLOPAMINE TRANSDERMAL SYSTEM 1 MG/1
1 PATCH, EXTENDED RELEASE TRANSDERMAL
Qty: 2 PATCH | Refills: 1 | Status: SHIPPED | OUTPATIENT
Start: 2022-07-08

## 2022-07-08 RX ORDER — AMLODIPINE BESYLATE 10 MG/1
TABLET ORAL
Qty: 90 TABLET | Refills: 3 | Status: SHIPPED | OUTPATIENT
Start: 2022-07-08

## 2022-07-08 SDOH — ECONOMIC STABILITY: FOOD INSECURITY: WITHIN THE PAST 12 MONTHS, YOU WORRIED THAT YOUR FOOD WOULD RUN OUT BEFORE YOU GOT MONEY TO BUY MORE.: NEVER TRUE

## 2022-07-08 SDOH — ECONOMIC STABILITY: FOOD INSECURITY: WITHIN THE PAST 12 MONTHS, THE FOOD YOU BOUGHT JUST DIDN'T LAST AND YOU DIDN'T HAVE MONEY TO GET MORE.: NEVER TRUE

## 2022-07-08 ASSESSMENT — PATIENT HEALTH QUESTIONNAIRE - PHQ9
1. LITTLE INTEREST OR PLEASURE IN DOING THINGS: 0
SUM OF ALL RESPONSES TO PHQ QUESTIONS 1-9: 0
SUM OF ALL RESPONSES TO PHQ QUESTIONS 1-9: 0
2. FEELING DOWN, DEPRESSED OR HOPELESS: 0
SUM OF ALL RESPONSES TO PHQ QUESTIONS 1-9: 0
SUM OF ALL RESPONSES TO PHQ QUESTIONS 1-9: 0
SUM OF ALL RESPONSES TO PHQ9 QUESTIONS 1 & 2: 0

## 2022-07-08 ASSESSMENT — LIFESTYLE VARIABLES
HOW MANY STANDARD DRINKS CONTAINING ALCOHOL DO YOU HAVE ON A TYPICAL DAY: 1 OR 2
HOW OFTEN DO YOU HAVE A DRINK CONTAINING ALCOHOL: MONTHLY OR LESS

## 2022-07-08 ASSESSMENT — SOCIAL DETERMINANTS OF HEALTH (SDOH): HOW HARD IS IT FOR YOU TO PAY FOR THE VERY BASICS LIKE FOOD, HOUSING, MEDICAL CARE, AND HEATING?: NOT HARD AT ALL

## 2022-07-08 NOTE — PATIENT INSTRUCTIONS
Your lab results will be released directly to you in your Mychart. I will review your labs and give you the results in a [employer] message or office phone call on Tuesday, if not sooner. Your fasting blood sugar should be below 100. If it is between 100-125 that is considered high and known as prediabetes, or  impaired glucose tolerance. If your blood sugar is too high, go to diabetes. org for a diabetes prevention diet. The A1c shows your average blood sugar over the previous 3 months. It is sometimes ordered if your blood sugar is elevated  You do not have to be fasting to get it drawn. If it is 5.6 or below it means your blood sugar is in the normal range  If between 5.7 and 6.5 it is impaired glucose tolerance. If it is above 6.5 it is consistent with the diagnosis of  diabetes. Decrease high carb foods if your blood sugar is high. High carbohydrate foods are:  Breads, muffins, rolls, and bagels  Pasta, rice, corn, and grains  Potatoes, sweet potatoes  Legumes: peas beans lentils. Milk ( almond milk ok)  Most fruit except berries  Cake cookies pies ice cream candy etc.  Juices, soda, sweetened ice tea  Beer. Cholesterol, the ideal numbers explained: Total cholesterol should be below 200  The triglycerides should be below 150  The HDL, the good cholesterol should be above 40  The LDL, the bad cholesterol should be below 100. Although it can be as high as 130 if no risk factors for heart disease or no diabetes. Improve your cholesterol profile:     Cardiovascular exercise helps. Start with 15 minutes three times a week and the work up to at least 30 minutes 5 days a week. Exercise at a level that you can carry on a conversation during. Loose extra pounds. Pay attention to your serving sides and avoid eating second helpings at meals. Significantly decrease the amount of processed food, junk food, and fast food that you eat.     Decrease animal sources of saturated fats, and completely avoid and eliminate  hydrogenated oils and trans fats. Check the Food Label on the food you eat and avoid foods that have hydrogenated vegetable oils in them. That includes bakery products. Drink skim milk which contains no fat. Increase the amount of fresh food that you cook yourself. Eat at least five servings of fruits and vegetables a day. Increase the portion of your plate that contains the fruit and vegetables to at least 50%,-70%,  and decrease the amount of starches like potatoes, rice, pasta to no more than 25% of your plate. Increase your fiber intake. Fiber is found in fruits and vegetables as well as whole grains, oatmeal,  and beans. A diet with at least 5 servings of fruits and vegetables should give you about 10-20grams of fiber daily. Switch to monounsaturated fats like olive oil. Fat from olives, avocados, coconut, or other nuts is okay. Add baked or grilled fish to you diet at least 1-2 times a week. Learn more about cholesterol on the Internet. Check out these resources:    American Heart Association   Heart. [address]:   [employer]    Triglycerides can be lowered without medication by exercising, and loosing weight and eating a diet lower in carbohydrates especially from flour sources,  and avoiding simple sugars. The good cholesterol is HDL. In order to increase the good cholesterol, increasing cardiovascular exercise helps. Avoid hydrogenated oils (trans fats) in processed, bakery,  and junk food. Use monounsaturated fats such as olive oil can help raise the good cholesterol. For your weight, exercise 30 minutes 5 times weekly and improve the types of food you eat:    Protein   Best options: The American Diabetes Association (ADA) recommends lean proteins low in saturated fat, like fish or turkey. Aim for two servings of seafood each week; some fish, like salmon, have the added benefit of containing heart healthy omega-3 fats.  For a vegetarian protein source, experiment with the wide variety of beans. Nuts, which are protein and healthy fats powerhouses, are also a choice.  Worst options: Processed deli meats and hot dogs have high amounts of fat along with lots of sodium, which can increase the risk of high blood pressure. Heart attack and stroke are two common complications of diabetes, so keeping blood pressure in check is important. Grains   Best options: When choosing grains, make sure theyre whole. Decrease the amount of foods which contain flour. Whole grains such as wild rice, quinoa, and whole grain breads and cereals contain fiber, which is beneficial for digestive health, but often the grains flour products raise your blood sugar and when your blood sugar returns to normal, it can trigger the desire to eat again.   Worst options: Refined white flour doesnt contain the same health benefits as whole grains. Processed foods made with white flour include breakfast cereals, white bread, and pastries, cookies, muffins, pretzels, crackers, so avoid these options. Also try to steer clear of white rice and pasta. Dairy   Best options: With only 6 to 8 grams of carbohydrates in a serving, plain nonfat [place] yogurt is a healthy and versatile dairy option. You can add berries and enjoy it for dessert or breakfast; you can use it in recipes as a replacement for sour cream, which is high in saturated fat. Skim milk.  Worst options: Avoid all full-fat dairy products and especially packaged chocolate milk, as it also has added sugar. Avoid yogurts with added sugar. Vegetables   Best options: Non-starchy vegetables such as leafy greens, broccoli, cauliflower, asparagus, and carrots are low in carbohydrates and high in fiber and other nutrients, Elsy Garcia says. You can eat non-starchy vegetables in abundance -- half of your plate should be filled with these veggies. If youre craving mashed potatoes, give mashed cauliflower a try.    Worst options: Stick to small portions of starchy vegetables such as corn, potatoes, and peas. These items are nutritious, but should be eaten in moderation. The ADA groups them with grains because of their high carb content. Fruit   Best options: Fresh fruit can conquer your craving for sweets while providing antioxidants and fiber. Berries are a great option because recommended portion sizes are typically generous, which may leave you feeling more satisfied   Worst options: Avoid added sugar by eliminating fruits canned in syrup, and be aware that dried fruits have a very high sugar concentration. Also, fruit juices should be consumed rarely as theyre high in sugar and dont contain the same nutrients as whole fruit. Fats   Best options: Some types of fat actually help protect your heart. Choose the monounsaturated fats found in avocados, almonds, olives, and pecans or the polyunsaturated fats found in walnuts and sunflower oil, which can help to lower bad cholesterol.  Worst options: Saturated fats increase bad cholesterol, so limit butter, cheese, gravy, and fried foods. Keep calories from animal sources of saturated fat to less than 10 percent of your total daily intake. Trans fats are even worse than saturated fats, so avoid them completely. Look for the term hydrogenated on labels of processed foods such as packaged snacks, baked goods, and crackers.  For more help losing weight:         Keep track of what you eat everyday :      Lose it! gennaro for the smart phone, or loseit. com or [employer] are free online tools you can use to track your daily intake.     The online tools will help you establish your goals for weight loss and how much you should eat a day to accomplish the goal.       A BMI (body mass index) of 25 to 29 is considered overweight, a BMI of 30 and above is considered obese. BMI is found with your vitals on your After Visit Summary.    In addition to the above diet info,  Iincrease the amount of fruit and vegetables you eat each day. Ideally you are eating at least 5 servings of fruit and or vegetables a day.     Drink at least 50 ounces of water a day.     Avoid or decrease processed food, fast food, and junk food.     Begin to exercise 15 minutes three times a week doing cardiovascular exercise.     Don't quit. It can take 12 weeks to break old habits before you feel like you are in a new routine. Then you have to live your new lifestyle. Davin Mcneal Once you are adjusted to your new habits you will not have to keep recording your daily intake, you should have a good idea of what is a normal amount for you to eat each day.     If you are obese, have elevated blood sugar, heart disease or a medical condition that is worsened by excess weight, I can refer you to a [employer] for education, usually covered by insurance.        Personalized Preventive Plan for Sean Sanford - 7/8/2022     Medicare offers a range of preventive health benefits. Some of the tests and screenings are paid in full while other may be subject to a deductible, co-insurance, and/or copay. Some of these benefits include a comprehensive review of your medical history including lifestyle, illnesses that may run in your family, and various assessments and screenings as appropriate. After reviewing your medical record and screening and assessments performed today your provider may have ordered immunizations, labs, imaging, and/or referrals for you. A list of these orders (if applicable) as well as your Preventive Care list are included within your After Visit Summary for your review. Other Preventive Recommendations:    · A preventive eye exam performed by an eye specialist is recommended every 1-2 years to screen for glaucoma; cataracts, macular degeneration, and other eye disorders. · A preventive dental visit is recommended every 6 months.   · Try to get at least 150 minutes of exercise per week or 10,000 steps per day on a pedometer . · Order or download the FREE \"Exercise & Physical Activity: Your Everyday Guide\" from [employer]. Call 8-549.781.5769 or search [employer] online. · You need 9349-5350 mg of calcium and 0156-3236 IU of vitamin D per day. It is possible to meet your calcium requirement with diet alone, but a vitamin D supplement is usually necessary to meet this goal.  · When exposed to the sun, use a sunscreen that protects against both UVA and UVB radiation with an SPF of 30 or greater. Reapply every 2 to 3 hours or after sweating, drying off with a towel, or swimming. · Always wear a seat belt when traveling in a car. Always wear a helmet when riding a bicycle or motorcycle.

## 2022-07-08 NOTE — PROGRESS NOTES
Medicare Annual Wellness Visit    June Ewing is here for Medicare AWV    Assessment & Plan   Welcome to Medicare preventive visit  Primary hypertension, uncontrolled  -     CBC; Future  -     Comprehensive Metabolic Panel; Future  -     Lipid Panel; Future  Hyperglycemia  -     Hemoglobin A1C; Future      Recommendations for Preventive Services Due: see orders and patient instructions/AVS.  Recommended screening schedule for the next 5-10 years is provided to the patient in written form: see Patient Instructions/AVS.     Return for Medicare Annual Wellness Visit in 1 year. Subjective       Patient's complete Health Risk Assessment and screening values have been reviewed and are found in Flowsheets. The following problems were reviewed today and where indicated follow up appointments were made and/or referrals ordered.     Positive Risk Factor Screenings with Interventions:               General Health and ACP:  General  In general, how would you say your health is?: Excellent  In the past 7 days, have you experienced any of the following: New or Increased Pain, New or Increased Fatigue, Loneliness, Social Isolation, Stress or Anger?: No  Do you get the social and emotional support that you need?: Yes  Do you have a Living Will?: Yes    Advance Directives     Power of  Living Will ACP-Advance Directive ACP-Power of     Not on File Not on File Not on File Not on File      General Health Risk Interventions:  · None needed    Health Habits/Nutrition:     Physical Activity: Inactive    Days of Exercise per Week: 0 days    Minutes of Exercise per Session: 0 min     Have you lost any weight without trying in the past 3 months?: No  Body mass index: (!) 27.58  Have you seen the dentist within the past year?: Yes    Health Habits/Nutrition Interventions:  · None needed, bmi: exercise             Objective   Vitals:    07/08/22 1142 07/08/22 1146   BP: (!) 153/77 (!) 150/77   Site: Left Upper Arm Right Upper Arm   Position: Sitting Sitting   Cuff Size: Medium Adult Medium Adult   Pulse: 75    SpO2: 99%    Weight: 146 lb (66.2 kg)    Height: 5' 1\" (1.549 m)       Body mass index is 27.59 kg/m². Well developed well nourished patient   in no acute distress. Alert and oriented to person, place, and time. HEENT:Normocephalic, atraumatic. No scleral icterus. Pupils normal  Heart: Regular Rate and Rhythm. Respirations: lungs clear to auscultation bilaterally. Abdomin: Bowel sounds present. no mass or tenderness  Extremities: No peripheral edema. No erythema. Allergies   Allergen Reactions    Latex Other (See Comments)     Yeast infection    Codeine Nausea And Vomiting     Prior to Visit Medications    Medication Sig Taking?  Authorizing Provider   amLODIPine (NORVASC) 10 MG tablet TAKE 1 TABLET BY MOUTH EVERY DAY Yes Breonna Segura DO   scopolamine (TRANSDERM-SCOP, 1.5 MG,) transdermal patch Place 1 patch onto the skin every 72 hours Yes Breonna Segura, DO       CareTeam (Including outside providers/suppliers regularly involved in providing care):   Patient Care Team:  Breonna Segura DO as PCP - General (Family Medicine)  Breonna Segura DO as PCP - REHABILITATION [facility] Empaneled Provider     Reviewed and updated this visit:  Tobacco  Allergies  Meds  Problems  Med Hx  Surg Hx  Soc Hx  Fam Hx

## 2022-07-09 LAB
A/G RATIO: 2.1 (ref 1.1–2.2)
ALBUMIN SERPL-MCNC: 4.5 G/DL (ref 3.4–5)
ALP BLD-CCNC: 91 U/L (ref 40–129)
ALT SERPL-CCNC: 35 U/L (ref 10–40)
ANION GAP SERPL CALCULATED.3IONS-SCNC: 16 MMOL/L (ref 3–16)
AST SERPL-CCNC: 22 U/L (ref 15–37)
BILIRUB SERPL-MCNC: 0.4 MG/DL (ref 0–1)
BUN BLDV-MCNC: 15 MG/DL (ref 7–20)
CALCIUM SERPL-MCNC: 9.1 MG/DL (ref 8.3–10.6)
CHLORIDE BLD-SCNC: 106 MMOL/L (ref 99–110)
CHOLESTEROL, TOTAL: 191 MG/DL (ref 0–199)
CO2: 22 MMOL/L (ref 21–32)
CREAT SERPL-MCNC: 0.6 MG/DL (ref 0.6–1.2)
ESTIMATED AVERAGE GLUCOSE: 116.9 MG/DL
GFR AFRICAN AMERICAN: >60
GFR NON-AFRICAN AMERICAN: >60
GLUCOSE BLD-MCNC: 89 MG/DL (ref 70–99)
HBA1C MFR BLD: 5.7 %
HCT VFR BLD CALC: 38.6 % (ref 36–48)
HDLC SERPL-MCNC: 55 MG/DL (ref 40–60)
HEMOGLOBIN: 12.9 G/DL (ref 12–16)
LDL CHOLESTEROL CALCULATED: 111 MG/DL
MCH RBC QN AUTO: 30.3 PG (ref 26–34)
MCHC RBC AUTO-ENTMCNC: 33.3 G/DL (ref 31–36)
MCV RBC AUTO: 90.8 FL (ref 80–100)
PDW BLD-RTO: 14.1 % (ref 12.4–15.4)
PLATELET # BLD: 271 K/UL (ref 135–450)
PMV BLD AUTO: 7.5 FL (ref 5–10.5)
POTASSIUM SERPL-SCNC: 4.2 MMOL/L (ref 3.5–5.1)
RBC # BLD: 4.25 M/UL (ref 4–5.2)
SODIUM BLD-SCNC: 144 MMOL/L (ref 136–145)
TOTAL PROTEIN: 6.6 G/DL (ref 6.4–8.2)
TRIGL SERPL-MCNC: 125 MG/DL (ref 0–150)
VLDLC SERPL CALC-MCNC: 25 MG/DL
WBC # BLD: 4.8 K/UL (ref 4–11)

## 2022-08-12 DIAGNOSIS — I10 UNCONTROLLED HYPERTENSION: Primary | ICD-10-CM

## 2022-08-12 RX ORDER — LISINOPRIL 5 MG/1
5 TABLET ORAL DAILY
Qty: 30 TABLET | Refills: 5 | Status: SHIPPED | OUTPATIENT
Start: 2022-08-12 | End: 2022-09-23 | Stop reason: SDUPTHER

## 2022-09-23 ENCOUNTER — OFFICE VISIT (OUTPATIENT)
Dept: FAMILY MEDICINE CLINIC | Age: 67
End: 2022-09-23
Payer: MEDICARE

## 2022-09-23 VITALS
WEIGHT: 146 LBS | DIASTOLIC BLOOD PRESSURE: 75 MMHG | HEART RATE: 70 BPM | OXYGEN SATURATION: 97 % | SYSTOLIC BLOOD PRESSURE: 150 MMHG | TEMPERATURE: 97.6 F | BODY MASS INDEX: 27.56 KG/M2 | HEIGHT: 61 IN

## 2022-09-23 DIAGNOSIS — I10 PRIMARY HYPERTENSION: Primary | ICD-10-CM

## 2022-09-23 PROCEDURE — 90694 VACC AIIV4 NO PRSRV 0.5ML IM: CPT | Performed by: FAMILY MEDICINE

## 2022-09-23 PROCEDURE — G8417 CALC BMI ABV UP PARAM F/U: HCPCS | Performed by: FAMILY MEDICINE

## 2022-09-23 PROCEDURE — 1036F TOBACCO NON-USER: CPT | Performed by: FAMILY MEDICINE

## 2022-09-23 PROCEDURE — 1123F ACP DISCUSS/DSCN MKR DOCD: CPT | Performed by: FAMILY MEDICINE

## 2022-09-23 PROCEDURE — G8427 DOCREV CUR MEDS BY ELIG CLIN: HCPCS | Performed by: FAMILY MEDICINE

## 2022-09-23 PROCEDURE — 1090F PRES/ABSN URINE INCON ASSESS: CPT | Performed by: FAMILY MEDICINE

## 2022-09-23 PROCEDURE — G0008 ADMIN INFLUENZA VIRUS VAC: HCPCS | Performed by: FAMILY MEDICINE

## 2022-09-23 PROCEDURE — G8400 PT W/DXA NO RESULTS DOC: HCPCS | Performed by: FAMILY MEDICINE

## 2022-09-23 PROCEDURE — 3017F COLORECTAL CA SCREEN DOC REV: CPT | Performed by: FAMILY MEDICINE

## 2022-09-23 PROCEDURE — 99213 OFFICE O/P EST LOW 20 MIN: CPT | Performed by: FAMILY MEDICINE

## 2022-09-23 RX ORDER — LISINOPRIL 5 MG/1
5 TABLET ORAL DAILY
Qty: 90 TABLET | Refills: 3 | Status: SHIPPED | OUTPATIENT
Start: 2022-09-23

## 2022-09-23 NOTE — PROGRESS NOTES
09/23/22      [facility]  Chief Complaint   Patient presents with    Hypertension          Diagnosis Orders   1. Primary hypertension          Jeb Lamar was seen today for hypertension. Diagnoses and all orders for this visit:    Primary hypertension    Improved on her home readings see scanned in chart. We will continue on same amount of lisinopril. She is can watch her cough and see if changes in allergy season do not improve her cough. -     Influenza, FLUAD, (age 72 y+), IM, PF, 0.5 mL  -     lisinopril (PRINIVIL;ZESTRIL) 5 MG tablet; Take 1 tablet by mouth daily      HPI  Recheck on uncontrolled htn, on lisinopril 5 and 10mg amlodipine  Started on new dose and has been keeping track, see scanned all normal   Has noticed coughing after lisinopril but also coughs with allergies in the late summer, which corresponded to when she started the medication so she thinks it is not from the medication. Wants to continue on it.   Past Medical History:   Diagnosis Date    Anxiety     Broken ankle 11-1-2011    Hypertension     Osteopenia     Reflux      Social History     Socioeconomic History    Marital status:      Spouse name: Not on file    Number of children: Not on file    Years of education: Not on file    Highest education level: Not on file   Occupational History    Not on file   Tobacco Use    Smoking status: Never    Smokeless tobacco: Never   Vaping Use    Vaping Use: Never used   Substance and Sexual Activity    Alcohol use: Yes     Comment: occasional    Drug use: No    Sexual activity: Yes     Partners: Male   Other Topics Concern    Not on file   Social History Narrative    Not on file     Social Determinants of Health     Financial Resource Strain: Low Risk     Difficulty of Paying Living Expenses: Not hard at all   Food Insecurity: No Food Insecurity    Worried About Running Out of Food in the Last Year: Never true    [address] in the Last Year: Never true   Transportation Needs: Not on file   Physical Activity: Inactive    Days of Exercise per Week: 0 days    Minutes of Exercise per Session: 0 min   Stress: Not on file   Social Connections: Not on file   Intimate Partner Violence: Not on file   Housing Stability: Not on file           Current Outpatient Medications   Medication Sig Dispense Refill    lisinopril (PRINIVIL;ZESTRIL) 5 MG tablet Take 1 tablet by mouth daily 90 tablet 3    amLODIPine (NORVASC) 10 MG tablet TAKE 1 TABLET BY MOUTH EVERY DAY 90 tablet 3    scopolamine (TRANSDERM-SCOP, 1.5 MG,) transdermal patch Place 1 patch onto the skin every 72 hours (Patient not taking: Reported on 9/23/2022) 2 patch 1     No current facility-administered medications for this visit. Vitals:    09/23/22 1305   BP: (!) 150/75   Pulse:    Temp:    SpO2:      BP Readings from Last 5 Encounters:   09/23/22 (!) 150/75   08/12/22 (!) 142/52   07/08/22 (!) 150/77   09/07/21 124/74   07/02/21 124/86       ROS:  No fever or chills  No unexpected wt loss  No headaches  No chest pain  No shortness of breath  No nausea, vomiting or diarrhea  No loss of coordination. Physical Exam  Well developed well nourished patient   in no acute distress. Alert and oriented to person, place, and time. HEENT:Normocephalic, atraumatic. No scleral icterus. Pupils normal  Heart: Regular Rate and Rhythm. Respirations: lungs clear to auscultation bilaterally. Abdomin: Bowel sounds present. Extremities: No peripheral edema. No erythema.         Time spent today included for this patient visit includes time spent preparing to see the patient  Including review of tests, labs and imaging,   revewing previous history and recent encounters,   obtaining and/or reviewing separately obtained history in care everywhere,  performing a medically appropriate examination and/or evaluation;   counseling and educating the patient and /family/caregiver when present,  ordering medications, tests, or procedures;   referring to other health care specialists;   documenting clinical information in the electronic health record;   independently interpreting results (not separately reported)   and communicating results to the patient. An electronic signature was used to authenticate this note. This was dictated. Errors mightbe possible due to dictation.   --Julien Liao, DO

## 2023-02-02 RX ORDER — LISINOPRIL 5 MG/1
TABLET ORAL
Qty: 90 TABLET | Refills: 3 | Status: SHIPPED | OUTPATIENT
Start: 2023-02-02

## 2023-02-02 NOTE — TELEPHONE ENCOUNTER
LOV 9/23/2022    Future Appointments   Date Time Provider Ankit Soto   3/3/2023  9:00  Progress Noemi

## 2023-02-13 ENCOUNTER — TELEMEDICINE (OUTPATIENT)
Dept: FAMILY MEDICINE CLINIC | Age: 68
End: 2023-02-13
Payer: MEDICARE

## 2023-02-13 DIAGNOSIS — J30.9 ALLERGIC RHINITIS, UNSPECIFIED SEASONALITY, UNSPECIFIED TRIGGER: Primary | ICD-10-CM

## 2023-02-13 PROCEDURE — 3017F COLORECTAL CA SCREEN DOC REV: CPT | Performed by: NURSE PRACTITIONER

## 2023-02-13 PROCEDURE — 1090F PRES/ABSN URINE INCON ASSESS: CPT | Performed by: NURSE PRACTITIONER

## 2023-02-13 PROCEDURE — G8400 PT W/DXA NO RESULTS DOC: HCPCS | Performed by: NURSE PRACTITIONER

## 2023-02-13 PROCEDURE — G8417 CALC BMI ABV UP PARAM F/U: HCPCS | Performed by: NURSE PRACTITIONER

## 2023-02-13 PROCEDURE — G8484 FLU IMMUNIZE NO ADMIN: HCPCS | Performed by: NURSE PRACTITIONER

## 2023-02-13 PROCEDURE — 99212 OFFICE O/P EST SF 10 MIN: CPT | Performed by: NURSE PRACTITIONER

## 2023-02-13 PROCEDURE — 1123F ACP DISCUSS/DSCN MKR DOCD: CPT | Performed by: NURSE PRACTITIONER

## 2023-02-13 PROCEDURE — G8427 DOCREV CUR MEDS BY ELIG CLIN: HCPCS | Performed by: NURSE PRACTITIONER

## 2023-02-13 PROCEDURE — 1036F TOBACCO NON-USER: CPT | Performed by: NURSE PRACTITIONER

## 2023-02-13 ASSESSMENT — ENCOUNTER SYMPTOMS
TROUBLE SWALLOWING: 0
SWOLLEN GLANDS: 0
CHOKING: 0
EYES NEGATIVE: 1
RHINORRHEA: 1
DIARRHEA: 0
GASTROINTESTINAL NEGATIVE: 1
CHEST TIGHTNESS: 0
COUGH: 0
WHEEZING: 0
VOMITING: 0
SORE THROAT: 0
NAUSEA: 0

## 2023-02-13 ASSESSMENT — PATIENT HEALTH QUESTIONNAIRE - PHQ9
SUM OF ALL RESPONSES TO PHQ QUESTIONS 1-9: 0
SUM OF ALL RESPONSES TO PHQ QUESTIONS 1-9: 0
1. LITTLE INTEREST OR PLEASURE IN DOING THINGS: 0
2. FEELING DOWN, DEPRESSED OR HOPELESS: 0
SUM OF ALL RESPONSES TO PHQ QUESTIONS 1-9: 0
SUM OF ALL RESPONSES TO PHQ9 QUESTIONS 1 & 2: 0
SUM OF ALL RESPONSES TO PHQ QUESTIONS 1-9: 0

## 2023-02-13 ASSESSMENT — ANXIETY QUESTIONNAIRES
1. FEELING NERVOUS, ANXIOUS, OR ON EDGE: 0
2. NOT BEING ABLE TO STOP OR CONTROL WORRYING: 0
3. WORRYING TOO MUCH ABOUT DIFFERENT THINGS: 0
6. BECOMING EASILY ANNOYED OR IRRITABLE: 0
5. BEING SO RESTLESS THAT IT IS HARD TO SIT STILL: 0
4. TROUBLE RELAXING: 0
IF YOU CHECKED OFF ANY PROBLEMS ON THIS QUESTIONNAIRE, HOW DIFFICULT HAVE THESE PROBLEMS MADE IT FOR YOU TO DO YOUR WORK, TAKE CARE OF THINGS AT HOME, OR GET ALONG WITH OTHER PEOPLE: NOT DIFFICULT AT ALL
7. FEELING AFRAID AS IF SOMETHING AWFUL MIGHT HAPPEN: 0
GAD7 TOTAL SCORE: 0

## 2023-02-13 NOTE — PROGRESS NOTES
2023    TELEHEALTH EVALUATION -- Audio/Visual (During TJPPK-68 public health emergency)    HPI:    Kishor Roman (:  1955) has requested an audio/video evaluation for the following concern(s):    Chief Complaint   Patient presents with    Cough     Runny nose,sneezing Covid Negative       URI   This is a new problem. The current episode started 1 to 4 weeks ago (3 weeks). The problem has been unchanged. There has been no fever. Associated symptoms include congestion, ear pain (fullness), a plugged ear sensation, rhinorrhea and sneezing. Pertinent negatives include no chest pain, coughing, diarrhea, dysuria, headaches, joint pain, joint swelling, nausea, neck pain, rash, sore throat, swollen glands, vomiting or wheezing. She has tried decongestant and increased fluids for the symptoms. The treatment provided no relief. Review of Systems   Constitutional:  Negative for activity change, chills, fatigue and fever. Covid negative   HENT:  Positive for congestion, ear pain (fullness), postnasal drip, rhinorrhea and sneezing. Negative for drooling, sore throat and trouble swallowing. Eyes: Negative. Respiratory:  Negative for cough, choking, chest tightness and wheezing. Cardiovascular:  Negative for chest pain. Gastrointestinal: Negative. Negative for diarrhea, nausea and vomiting. Genitourinary:  Negative for dysuria. Musculoskeletal:  Negative for joint pain and neck pain. Skin:  Negative for rash. Allergic/Immunologic: Positive for environmental allergies. Neurological:  Negative for headaches. Prior to Visit Medications    Medication Sig Taking?  Authorizing Provider   lisinopril (PRINIVIL;ZESTRIL) 5 MG tablet TAKE 1 TABLET BY MOUTH IN THE MORNING Yes Enoch Lazcano DO   amLODIPine (NORVASC) 10 MG tablet TAKE 1 TABLET BY MOUTH EVERY DAY Yes Enoch Lazcano DO   scopolamine (TRANSDERM-SCOP, 1.5 MG,) transdermal patch Place 1 patch onto the skin every 72 hours  Patient not taking: No sig reported  Shady Franco DO       Social History     Tobacco Use    Smoking status: Never    Smokeless tobacco: Never   Vaping Use    Vaping Use: Never used   Substance Use Topics    Alcohol use: Yes     Comment: occasional    Drug use: No        Allergies   Allergen Reactions    Latex Other (See Comments)     Yeast infection    Codeine Nausea And Vomiting   ,   Past Medical History:   Diagnosis Date    Anxiety     Broken ankle 11-1-2011    Hypertension     Osteopenia     Reflux    ,   Past Surgical History:   Procedure Laterality Date    BREAST ENHANCEMENT SURGERY  1987    BREAST ENHANCEMENT SURGERY      silicone    CHOLECYSTECTOMY  6/17/2016    laparoscopic    COLPOSCOPY      DILATION AND CURETTAGE OF UTERUS  9/20/13    EYE SURGERY  2006    both lens replaced    GALLBLADDER SURGERY      UPPER GASTROINTESTINAL ENDOSCOPY  5/19/11    colonoscopy       PHYSICAL EXAMINATION:  Patient-Reported Vitals 2/13/2023   Patient-Reported Weight 140lb   Patient-Reported Height 5f1   Patient-Reported Systolic 732   Patient-Reported Diastolic 60         Physical Exam  Constitutional:       General: She is not in acute distress. Appearance: Normal appearance. She is obese. She is not ill-appearing, toxic-appearing or diaphoretic. HENT:      Head: Normocephalic and atraumatic. Right Ear: External ear normal.      Left Ear: External ear normal.      Nose: Nose normal. No rhinorrhea. Mouth/Throat:      Pharynx: Oropharynx is clear. Eyes:      General:         Right eye: No discharge. Left eye: No discharge. Conjunctiva/sclera: Conjunctivae normal.   Neck:      Trachea: Phonation normal.   Pulmonary:      Effort: Pulmonary effort is normal. No respiratory distress. Comments: Respirations easy and even, able to speak in complete sentences without shortness of breath or audible wheezing   Musculoskeletal:      Cervical back: Normal range of motion.    Skin:     Coloration: Skin is not jaundiced or pale. Neurological:      General: No focal deficit present. Mental Status: She is alert and oriented to person, place, and time. Psychiatric:         Mood and Affect: Mood normal.         Behavior: Behavior normal.         Thought Content: Thought content normal.         Judgment: Judgment normal.         ASSESSMENT/PLAN:    ICD-10-CM    1. Allergic rhinitis, unspecified seasonality, unspecified trigger  J30.9         Reviewed supportive care  Flonase nasal spray- one spray each nostril twice daily x 7 days then decrease to once daily  Start claritin or zyrtec  Report if not improving      No orders of the defined types were placed in this encounter. No orders of the defined types were placed in this encounter. Return if symptoms worsen or fail to improve. Elvira Miller is a 74 y.o. female  was evaluated through a synchronous (real-time) audio-video encounter. The patient (or guardian if applicable) is aware that this is a billable service, which includes applicable co-pays. This Virtual Visit was conducted with patient's (and/or legal guardian's) consent. The visit was conducted pursuant to the emergency declaration under the [address] [place] [address], [address] [place] [address] authority and the [employer] General Act. Patient identification was verified, and a caregiver was present when appropriate. The patient was located in a state where the provider was licensed to provide care. Patient identification was verified at the start of the visit: Yes    Total time spent on this encounter: Not billed by time    Services were provided through a video synchronous discussion virtually to substitute for in-person clinic visit. Patient and provider were located at their individual homes. --GURU Aquino CNP on 2/13/2023 at 8:56 AM    An electronic signature was used to authenticate this note.

## 2023-03-01 NOTE — PROGRESS NOTES
03/03/23      Kaycee Her  Chief Complaint   Patient presents with    Hypertension     Follow up     Sinusitis          Diagnosis Orders   1. Primary hypertension        2. Other acute nonsuppurative otitis media of right ear, recurrence not specified  azithromycin (ZITHROMAX) 250 MG tablet      3. Allergic sinusitis  JESSEE - Tameka Aldrich MD, Allergy & Immunology, [facility] was seen today for hypertension and sinusitis. Diagnoses and all orders for this visit:    Primary hypertension  Now controlled  Other acute nonsuppurative otitis media of right ear, recurrence not specified, new  -     azithromycin (ZITHROMAX) 250 MG tablet; Take 2 pills day one, one pill day 2-5    Allergic sinusitis, chronic  -     JESSEE Aldrich MD, Allergy & Immunology, [facility]  Follow up uncontrolled htn, but home readings are good. Plan last visit was to continue same dose of lisinopril 5mg, and she is on 10mg amlodipine  She has had recent sinus allergies flaring up. She is taking over-the-counter Claritin or Zyrtec and using Flonase. Is not helping. Is worse in the morning when she wakes up. And she is feeling some pressure in her sinuses with a lot of drainage. Vitals:    03/03/23 0848   BP: 132/78   Pulse: 81   Resp: 16   Temp: 97.9 °F (36.6 °C)   SpO2: 98%   Weight: 147 lb 12.8 oz (67 kg)     Body mass index is 27.93 kg/m².      Wt Readings from Last 3 Encounters:   03/03/23 147 lb 12.8 oz (67 kg)   09/23/22 146 lb (66.2 kg)   07/08/22 146 lb (66.2 kg)     BP Readings from Last 3 Encounters:   03/03/23 132/78   09/23/22 (!) 150/75   08/12/22 (!) 142/52          Past Medical History:   Diagnosis Date    Anxiety     Broken ankle 11-1-2011    Hypertension     Osteopenia     Reflux      Social History     Socioeconomic History    Marital status:      Spouse name: Not on file    Number of children: Not on file    Years of education: Not on file    Highest education level: Not on file   Occupational History    Not on file   Tobacco Use    Smoking status: Never    Smokeless tobacco: Never   Vaping Use    Vaping Use: Never used   Substance and Sexual Activity    Alcohol use: Yes     Comment: occasional    Drug use: No    Sexual activity: Yes     Partners: Male   Other Topics Concern    Not on file   Social History Narrative    Not on file     Social Determinants of Health     Financial Resource Strain: Low Risk     Difficulty of Paying Living Expenses: Not hard at all   Food Insecurity: No Food Insecurity    Worried About [address] in the Last Year: Never true    [facility] [employer] in the Last Year: Never true   Transportation Needs: Unknown    Lack of Transportation (Medical): Not on file    Lack of Transportation (Non-Medical): No   Physical Activity: Inactive    Days of Exercise per Week: 0 days    Minutes of Exercise per Session: 0 min   Stress: Not on file   Social Connections: Not on file   Intimate Partner Violence: Not on file   Housing Stability: Unknown    Unable to Pay for Housing in the Last Year: Not on file    Number of Places Lived in the Last Year: Not on file    Unstable Housing in the Last Year: No           Current Outpatient Medications   Medication Sig Dispense Refill    azithromycin (ZITHROMAX) 250 MG tablet Take 2 pills day one, one pill day 2-5 1 packet 0    lisinopril (PRINIVIL;ZESTRIL) 5 MG tablet TAKE 1 TABLET BY MOUTH IN THE MORNING 90 tablet 3    amLODIPine (NORVASC) 10 MG tablet TAKE 1 TABLET BY MOUTH EVERY DAY 90 tablet 3     No current facility-administered medications for this visit.        Vitals:    03/03/23 0848   BP: 132/78   Pulse: 81   Resp: 16   Temp: 97.9 °F (36.6 °C)   SpO2: 98%     BP Readings from Last 5 Encounters:   03/03/23 132/78   09/23/22 (!) 150/75   08/12/22 (!) 142/52   07/08/22 (!) 150/77   09/07/21 124/74       ROS:  No fever or chills  No unexpected wt loss  No headaches  No chest pain  No shortness of breath  No nausea, vomiting or diarrhea  No loss of coordination. Physical Exam  Well developed well nourished patient   in no acute distress. Alert and oriented to person, place, and time. HEENT:Normocephalic, atraumatic. No scleral icterus. Pupils normal.  Right TM bulging with opaque fluid. Left TM positive fluid although it is clear fluid. Heart: Regular Rate and Rhythm. Positive murmur  Respirations: lungs clear to auscultation bilaterally. Abdomin: Bowel sounds present. Extremities: No peripheral edema. No erythema. Time spent today  for this patient visit may include the following:   time spent preparing to see the patient  Including review of tests, labs and imaging,   revewing previous history and recent encounters,   obtaining and/or reviewing separately obtained history in care everywhere,  performing a medically appropriate examination and/or evaluation;   counseling and educating the patient and /family/caregiver when present,  ordering medications, tests, or procedures;   referring to other health care specialists;   documenting clinical information in the electronic health record;   independently interpreting results (not separately reported)   and communicating results to the patient. An electronic signature was used to authenticate this note. This was dictated. Errors mightbe possible due to dictation.   --Samaria Knight, DO

## 2023-03-03 ENCOUNTER — OFFICE VISIT (OUTPATIENT)
Dept: FAMILY MEDICINE CLINIC | Age: 68
End: 2023-03-03

## 2023-03-03 VITALS
SYSTOLIC BLOOD PRESSURE: 132 MMHG | TEMPERATURE: 97.9 F | RESPIRATION RATE: 16 BRPM | DIASTOLIC BLOOD PRESSURE: 78 MMHG | BODY MASS INDEX: 27.93 KG/M2 | OXYGEN SATURATION: 98 % | HEART RATE: 81 BPM | WEIGHT: 147.8 LBS

## 2023-03-03 DIAGNOSIS — H65.191 OTHER ACUTE NONSUPPURATIVE OTITIS MEDIA OF RIGHT EAR, RECURRENCE NOT SPECIFIED: ICD-10-CM

## 2023-03-03 DIAGNOSIS — J30.9 ALLERGIC SINUSITIS: ICD-10-CM

## 2023-03-03 DIAGNOSIS — I10 PRIMARY HYPERTENSION: Primary | ICD-10-CM

## 2023-03-03 RX ORDER — AZITHROMYCIN 250 MG/1
TABLET, FILM COATED ORAL
Qty: 1 PACKET | Refills: 0 | Status: SHIPPED | OUTPATIENT
Start: 2023-03-03 | End: 2023-03-13

## 2023-03-03 SDOH — ECONOMIC STABILITY: FOOD INSECURITY: WITHIN THE PAST 12 MONTHS, THE FOOD YOU BOUGHT JUST DIDN'T LAST AND YOU DIDN'T HAVE MONEY TO GET MORE.: NEVER TRUE

## 2023-03-03 SDOH — ECONOMIC STABILITY: INCOME INSECURITY: HOW HARD IS IT FOR YOU TO PAY FOR THE VERY BASICS LIKE FOOD, HOUSING, MEDICAL CARE, AND HEATING?: NOT HARD AT ALL

## 2023-03-03 SDOH — ECONOMIC STABILITY: FOOD INSECURITY: WITHIN THE PAST 12 MONTHS, YOU WORRIED THAT YOUR FOOD WOULD RUN OUT BEFORE YOU GOT MONEY TO BUY MORE.: NEVER TRUE

## 2023-03-03 SDOH — ECONOMIC STABILITY: HOUSING INSECURITY
IN THE LAST 12 MONTHS, WAS THERE A TIME WHEN YOU DID NOT HAVE A STEADY PLACE TO SLEEP OR SLEPT IN A SHELTER (INCLUDING NOW)?: NO

## 2023-03-03 ASSESSMENT — PATIENT HEALTH QUESTIONNAIRE - PHQ9
SUM OF ALL RESPONSES TO PHQ QUESTIONS 1-9: 0
1. LITTLE INTEREST OR PLEASURE IN DOING THINGS: 0
SUM OF ALL RESPONSES TO PHQ QUESTIONS 1-9: 0
SUM OF ALL RESPONSES TO PHQ9 QUESTIONS 1 & 2: 0
SUM OF ALL RESPONSES TO PHQ QUESTIONS 1-9: 0
SUM OF ALL RESPONSES TO PHQ QUESTIONS 1-9: 0
2. FEELING DOWN, DEPRESSED OR HOPELESS: 0

## 2023-03-03 ASSESSMENT — ANXIETY QUESTIONNAIRES
2. NOT BEING ABLE TO STOP OR CONTROL WORRYING: 0
3. WORRYING TOO MUCH ABOUT DIFFERENT THINGS: 0
6. BECOMING EASILY ANNOYED OR IRRITABLE: 0
5. BEING SO RESTLESS THAT IT IS HARD TO SIT STILL: 0
7. FEELING AFRAID AS IF SOMETHING AWFUL MIGHT HAPPEN: 0
4. TROUBLE RELAXING: 0
GAD7 TOTAL SCORE: 0
IF YOU CHECKED OFF ANY PROBLEMS ON THIS QUESTIONNAIRE, HOW DIFFICULT HAVE THESE PROBLEMS MADE IT FOR YOU TO DO YOUR WORK, TAKE CARE OF THINGS AT HOME, OR GET ALONG WITH OTHER PEOPLE: NOT DIFFICULT AT ALL
1. FEELING NERVOUS, ANXIOUS, OR ON EDGE: 0

## 2023-04-05 DIAGNOSIS — I10 UNCONTROLLED HYPERTENSION: ICD-10-CM

## 2023-04-05 RX ORDER — AMLODIPINE BESYLATE 10 MG/1
TABLET ORAL
Qty: 90 TABLET | Refills: 3 | Status: SHIPPED | OUTPATIENT
Start: 2023-04-05

## 2023-04-05 NOTE — TELEPHONE ENCOUNTER
3/3/2023        Future Appointments   Date Time Provider Ankit Soto   7/14/2023 10:00 AM DO Marina Mirza

## 2023-05-03 ENCOUNTER — OFFICE VISIT (OUTPATIENT)
Dept: FAMILY MEDICINE CLINIC | Age: 68
End: 2023-05-03
Payer: MEDICARE

## 2023-05-03 VITALS
RESPIRATION RATE: 14 BRPM | WEIGHT: 149 LBS | HEIGHT: 61 IN | TEMPERATURE: 97.3 F | OXYGEN SATURATION: 99 % | SYSTOLIC BLOOD PRESSURE: 122 MMHG | BODY MASS INDEX: 28.13 KG/M2 | HEART RATE: 86 BPM | DIASTOLIC BLOOD PRESSURE: 80 MMHG

## 2023-05-03 DIAGNOSIS — I10 PRIMARY HYPERTENSION: ICD-10-CM

## 2023-05-03 DIAGNOSIS — R39.9 UTI SYMPTOMS: Primary | ICD-10-CM

## 2023-05-03 LAB
BILIRUBIN, POC: NORMAL
BLOOD URINE, POC: NORMAL
CLARITY, POC: NORMAL
COLOR, POC: NORMAL
GLUCOSE URINE, POC: NORMAL
KETONES, POC: NORMAL
LEUKOCYTE EST, POC: NORMAL
NITRITE, POC: NORMAL
PH, POC: 5
PROTEIN, POC: NORMAL
SPECIFIC GRAVITY, POC: 1.01
UROBILINOGEN, POC: NORMAL

## 2023-05-03 PROCEDURE — 81002 URINALYSIS NONAUTO W/O SCOPE: CPT | Performed by: STUDENT IN AN ORGANIZED HEALTH CARE EDUCATION/TRAINING PROGRAM

## 2023-05-03 PROCEDURE — G8427 DOCREV CUR MEDS BY ELIG CLIN: HCPCS | Performed by: STUDENT IN AN ORGANIZED HEALTH CARE EDUCATION/TRAINING PROGRAM

## 2023-05-03 PROCEDURE — 3017F COLORECTAL CA SCREEN DOC REV: CPT | Performed by: STUDENT IN AN ORGANIZED HEALTH CARE EDUCATION/TRAINING PROGRAM

## 2023-05-03 PROCEDURE — 1090F PRES/ABSN URINE INCON ASSESS: CPT | Performed by: STUDENT IN AN ORGANIZED HEALTH CARE EDUCATION/TRAINING PROGRAM

## 2023-05-03 PROCEDURE — G8417 CALC BMI ABV UP PARAM F/U: HCPCS | Performed by: STUDENT IN AN ORGANIZED HEALTH CARE EDUCATION/TRAINING PROGRAM

## 2023-05-03 PROCEDURE — 1123F ACP DISCUSS/DSCN MKR DOCD: CPT | Performed by: STUDENT IN AN ORGANIZED HEALTH CARE EDUCATION/TRAINING PROGRAM

## 2023-05-03 PROCEDURE — 1036F TOBACCO NON-USER: CPT | Performed by: STUDENT IN AN ORGANIZED HEALTH CARE EDUCATION/TRAINING PROGRAM

## 2023-05-03 PROCEDURE — 3074F SYST BP LT 130 MM HG: CPT | Performed by: STUDENT IN AN ORGANIZED HEALTH CARE EDUCATION/TRAINING PROGRAM

## 2023-05-03 PROCEDURE — 3078F DIAST BP <80 MM HG: CPT | Performed by: STUDENT IN AN ORGANIZED HEALTH CARE EDUCATION/TRAINING PROGRAM

## 2023-05-03 PROCEDURE — G8400 PT W/DXA NO RESULTS DOC: HCPCS | Performed by: STUDENT IN AN ORGANIZED HEALTH CARE EDUCATION/TRAINING PROGRAM

## 2023-05-03 PROCEDURE — 99213 OFFICE O/P EST LOW 20 MIN: CPT | Performed by: STUDENT IN AN ORGANIZED HEALTH CARE EDUCATION/TRAINING PROGRAM

## 2023-05-03 RX ORDER — LORATADINE 10 MG/1
10 TABLET ORAL DAILY
COMMUNITY

## 2023-05-03 SDOH — ECONOMIC STABILITY: FOOD INSECURITY: WITHIN THE PAST 12 MONTHS, THE FOOD YOU BOUGHT JUST DIDN'T LAST AND YOU DIDN'T HAVE MONEY TO GET MORE.: NEVER TRUE

## 2023-05-03 SDOH — ECONOMIC STABILITY: FOOD INSECURITY: WITHIN THE PAST 12 MONTHS, YOU WORRIED THAT YOUR FOOD WOULD RUN OUT BEFORE YOU GOT MONEY TO BUY MORE.: NEVER TRUE

## 2023-05-03 SDOH — ECONOMIC STABILITY: INCOME INSECURITY: HOW HARD IS IT FOR YOU TO PAY FOR THE VERY BASICS LIKE FOOD, HOUSING, MEDICAL CARE, AND HEATING?: NOT VERY HARD

## 2023-05-03 ASSESSMENT — ANXIETY QUESTIONNAIRES
2. NOT BEING ABLE TO STOP OR CONTROL WORRYING: 0
GAD7 TOTAL SCORE: 0
6. BECOMING EASILY ANNOYED OR IRRITABLE: 0
5. BEING SO RESTLESS THAT IT IS HARD TO SIT STILL: 0
1. FEELING NERVOUS, ANXIOUS, OR ON EDGE: 0
7. FEELING AFRAID AS IF SOMETHING AWFUL MIGHT HAPPEN: 0
3. WORRYING TOO MUCH ABOUT DIFFERENT THINGS: 0
IF YOU CHECKED OFF ANY PROBLEMS ON THIS QUESTIONNAIRE, HOW DIFFICULT HAVE THESE PROBLEMS MADE IT FOR YOU TO DO YOUR WORK, TAKE CARE OF THINGS AT HOME, OR GET ALONG WITH OTHER PEOPLE: NOT DIFFICULT AT ALL
4. TROUBLE RELAXING: 0

## 2023-05-03 ASSESSMENT — PATIENT HEALTH QUESTIONNAIRE - PHQ9
SUM OF ALL RESPONSES TO PHQ QUESTIONS 1-9: 0
SUM OF ALL RESPONSES TO PHQ QUESTIONS 1-9: 0
SUM OF ALL RESPONSES TO PHQ9 QUESTIONS 1 & 2: 0
2. FEELING DOWN, DEPRESSED OR HOPELESS: 0
SUM OF ALL RESPONSES TO PHQ QUESTIONS 1-9: 0
1. LITTLE INTEREST OR PLEASURE IN DOING THINGS: 0
SUM OF ALL RESPONSES TO PHQ QUESTIONS 1-9: 0

## 2023-05-03 ASSESSMENT — ENCOUNTER SYMPTOMS
VOMITING: 0
NAUSEA: 0

## 2023-05-04 LAB — BACTERIA UR CULT: NORMAL

## 2023-07-11 SDOH — HEALTH STABILITY: PHYSICAL HEALTH: ON AVERAGE, HOW MANY DAYS PER WEEK DO YOU ENGAGE IN MODERATE TO STRENUOUS EXERCISE (LIKE A BRISK WALK)?: 2 DAYS

## 2023-07-11 SDOH — HEALTH STABILITY: PHYSICAL HEALTH: ON AVERAGE, HOW MANY MINUTES DO YOU ENGAGE IN EXERCISE AT THIS LEVEL?: 20 MIN

## 2023-07-11 ASSESSMENT — LIFESTYLE VARIABLES
HOW MANY STANDARD DRINKS CONTAINING ALCOHOL DO YOU HAVE ON A TYPICAL DAY: 3 OR 4
HAS A RELATIVE, FRIEND, DOCTOR, OR ANOTHER HEALTH PROFESSIONAL EXPRESSED CONCERN ABOUT YOUR DRINKING OR SUGGESTED YOU CUT DOWN: NO
HOW OFTEN DURING THE LAST YEAR HAVE YOU FAILED TO DO WHAT WAS NORMALLY EXPECTED FROM YOU BECAUSE OF DRINKING: NEVER
HOW OFTEN DO YOU HAVE A DRINK CONTAINING ALCOHOL: 3
HAS A RELATIVE, FRIEND, DOCTOR, OR ANOTHER HEALTH PROFESSIONAL EXPRESSED CONCERN ABOUT YOUR DRINKING OR SUGGESTED YOU CUT DOWN: 0
HOW OFTEN DURING THE LAST YEAR HAVE YOU FOUND THAT YOU WERE NOT ABLE TO STOP DRINKING ONCE YOU HAD STARTED: NEVER
HOW OFTEN DURING THE LAST YEAR HAVE YOU FAILED TO DO WHAT WAS NORMALLY EXPECTED FROM YOU BECAUSE OF DRINKING: 0
HOW MANY STANDARD DRINKS CONTAINING ALCOHOL DO YOU HAVE ON A TYPICAL DAY: 2
HOW OFTEN DURING THE LAST YEAR HAVE YOU BEEN UNABLE TO REMEMBER WHAT HAPPENED THE NIGHT BEFORE BECAUSE YOU HAD BEEN DRINKING: NEVER
HAVE YOU OR SOMEONE ELSE BEEN INJURED AS A RESULT OF YOUR DRINKING: NO
HOW OFTEN DO YOU HAVE SIX OR MORE DRINKS ON ONE OCCASION: 1
HOW OFTEN DURING THE LAST YEAR HAVE YOU BEEN UNABLE TO REMEMBER WHAT HAPPENED THE NIGHT BEFORE BECAUSE YOU HAD BEEN DRINKING: 0
HOW OFTEN DURING THE LAST YEAR HAVE YOU NEEDED AN ALCOHOLIC DRINK FIRST THING IN THE MORNING TO GET YOURSELF GOING AFTER A NIGHT OF HEAVY DRINKING: NEVER
HOW OFTEN DURING THE LAST YEAR HAVE YOU HAD A FEELING OF GUILT OR REMORSE AFTER DRINKING: NEVER
HOW OFTEN DURING THE LAST YEAR HAVE YOU HAD A FEELING OF GUILT OR REMORSE AFTER DRINKING: 0
HOW OFTEN DURING THE LAST YEAR HAVE YOU NEEDED AN ALCOHOLIC DRINK FIRST THING IN THE MORNING TO GET YOURSELF GOING AFTER A NIGHT OF HEAVY DRINKING: 0
HOW OFTEN DO YOU HAVE A DRINK CONTAINING ALCOHOL: 2-4 TIMES A MONTH
HAVE YOU OR SOMEONE ELSE BEEN INJURED AS A RESULT OF YOUR DRINKING: 0
HOW OFTEN DURING THE LAST YEAR HAVE YOU FOUND THAT YOU WERE NOT ABLE TO STOP DRINKING ONCE YOU HAD STARTED: 0

## 2023-07-11 ASSESSMENT — PATIENT HEALTH QUESTIONNAIRE - PHQ9
SUM OF ALL RESPONSES TO PHQ QUESTIONS 1-9: 0
SUM OF ALL RESPONSES TO PHQ QUESTIONS 1-9: 0
SUM OF ALL RESPONSES TO PHQ9 QUESTIONS 1 & 2: 0
1. LITTLE INTEREST OR PLEASURE IN DOING THINGS: 0
SUM OF ALL RESPONSES TO PHQ QUESTIONS 1-9: 0
SUM OF ALL RESPONSES TO PHQ QUESTIONS 1-9: 0
2. FEELING DOWN, DEPRESSED OR HOPELESS: 0

## 2023-07-14 ENCOUNTER — OFFICE VISIT (OUTPATIENT)
Dept: FAMILY MEDICINE CLINIC | Age: 68
End: 2023-07-14
Payer: MEDICARE

## 2023-07-14 VITALS
WEIGHT: 147 LBS | DIASTOLIC BLOOD PRESSURE: 68 MMHG | OXYGEN SATURATION: 97 % | BODY MASS INDEX: 27.75 KG/M2 | SYSTOLIC BLOOD PRESSURE: 128 MMHG | HEIGHT: 61 IN | HEART RATE: 78 BPM | TEMPERATURE: 96.9 F | RESPIRATION RATE: 14 BRPM

## 2023-07-14 DIAGNOSIS — I10 PRIMARY HYPERTENSION: ICD-10-CM

## 2023-07-14 DIAGNOSIS — Z00.00 MEDICARE ANNUAL WELLNESS VISIT, SUBSEQUENT: Primary | ICD-10-CM

## 2023-07-14 LAB
ALBUMIN SERPL-MCNC: 4.8 G/DL (ref 3.4–5)
ALBUMIN/GLOB SERPL: 2 {RATIO} (ref 1.1–2.2)
ALP SERPL-CCNC: 95 U/L (ref 40–129)
ALT SERPL-CCNC: 34 U/L (ref 10–40)
ANION GAP SERPL CALCULATED.3IONS-SCNC: 15 MMOL/L (ref 3–16)
AST SERPL-CCNC: 20 U/L (ref 15–37)
BILIRUB SERPL-MCNC: 0.4 MG/DL (ref 0–1)
BUN SERPL-MCNC: 14 MG/DL (ref 7–20)
CALCIUM SERPL-MCNC: 9.4 MG/DL (ref 8.3–10.6)
CHLORIDE SERPL-SCNC: 104 MMOL/L (ref 99–110)
CHOLEST SERPL-MCNC: 220 MG/DL (ref 0–199)
CO2 SERPL-SCNC: 21 MMOL/L (ref 21–32)
CREAT SERPL-MCNC: 0.7 MG/DL (ref 0.6–1.2)
DEPRECATED RDW RBC AUTO: 13.9 % (ref 12.4–15.4)
GFR SERPLBLD CREATININE-BSD FMLA CKD-EPI: >60 ML/MIN/{1.73_M2}
GLUCOSE SERPL-MCNC: 100 MG/DL (ref 70–99)
HCT VFR BLD AUTO: 38.8 % (ref 36–48)
HDLC SERPL-MCNC: 57 MG/DL (ref 40–60)
HGB BLD-MCNC: 13.2 G/DL (ref 12–16)
LDLC SERPL CALC-MCNC: 125 MG/DL
MCH RBC QN AUTO: 30.8 PG (ref 26–34)
MCHC RBC AUTO-ENTMCNC: 34.1 G/DL (ref 31–36)
MCV RBC AUTO: 90.3 FL (ref 80–100)
PLATELET # BLD AUTO: 309 K/UL (ref 135–450)
PMV BLD AUTO: 7.5 FL (ref 5–10.5)
POTASSIUM SERPL-SCNC: 4.5 MMOL/L (ref 3.5–5.1)
PROT SERPL-MCNC: 7.2 G/DL (ref 6.4–8.2)
RBC # BLD AUTO: 4.29 M/UL (ref 4–5.2)
SODIUM SERPL-SCNC: 140 MMOL/L (ref 136–145)
TRIGL SERPL-MCNC: 188 MG/DL (ref 0–150)
TSH SERPL DL<=0.005 MIU/L-ACNC: 1.32 UIU/ML (ref 0.27–4.2)
VLDLC SERPL CALC-MCNC: 38 MG/DL
WBC # BLD AUTO: 5.4 K/UL (ref 4–11)

## 2023-07-14 PROCEDURE — 3078F DIAST BP <80 MM HG: CPT | Performed by: FAMILY MEDICINE

## 2023-07-14 PROCEDURE — 3017F COLORECTAL CA SCREEN DOC REV: CPT | Performed by: FAMILY MEDICINE

## 2023-07-14 PROCEDURE — G0009 ADMIN PNEUMOCOCCAL VACCINE: HCPCS | Performed by: FAMILY MEDICINE

## 2023-07-14 PROCEDURE — 1123F ACP DISCUSS/DSCN MKR DOCD: CPT | Performed by: FAMILY MEDICINE

## 2023-07-14 PROCEDURE — G0439 PPPS, SUBSEQ VISIT: HCPCS | Performed by: FAMILY MEDICINE

## 2023-07-14 PROCEDURE — 90677 PCV20 VACCINE IM: CPT | Performed by: FAMILY MEDICINE

## 2023-07-14 PROCEDURE — 3074F SYST BP LT 130 MM HG: CPT | Performed by: FAMILY MEDICINE

## 2023-07-14 RX ORDER — MONTELUKAST SODIUM 10 MG/1
10 TABLET ORAL NIGHTLY
COMMUNITY

## 2023-07-14 RX ORDER — SCOLOPAMINE TRANSDERMAL SYSTEM 1 MG/1
1 PATCH, EXTENDED RELEASE TRANSDERMAL
Qty: 2 PATCH | Refills: 0 | Status: SHIPPED | OUTPATIENT
Start: 2023-07-14

## 2023-07-14 RX ORDER — AZELASTINE 1 MG/ML
1 SPRAY, METERED NASAL 2 TIMES DAILY
COMMUNITY

## 2023-07-14 NOTE — PATIENT INSTRUCTIONS
switches (such as switches that go on or off when you clap your hands) to make it easier to turn lights on if you have to get up during the night. Install sturdy handrails on stairways. Move items in your cabinets so that the things you use a lot are on the lower shelves (about waist level). Keep a cordless phone and a flashlight with new batteries by your bed. If possible, put a phone in each of the main rooms of your house, or carry a cell phone in case you fall and cannot reach a phone. Or, you can wear a device around your neck or wrist. You push a button that sends a signal for help. Wear low-heeled shoes that fit well and give your feet good support. Use footwear with nonskid soles. Check the heels and soles of your shoes for wear. Repair or replace worn heels or soles. Do not wear socks without shoes on wood floors. Walk on the grass when the sidewalks are slippery. If you live in an area that gets snow and ice in the winter, sprinkle salt on slippery steps and sidewalks. Preventing falls in the bath  Install grab bars and nonskid mats inside and outside your shower or tub and near the toilet and sinks. Use shower chairs and bath benches. Use a hand-held shower head that will allow you to sit while showering. Get into a tub or shower by putting the weaker leg in first. Get out of a tub or shower with your strong side first.  Repair loose toilet seats and consider installing a raised toilet seat to make getting on and off the toilet easier. Keep your bathroom door unlocked while you are in the shower. For a hearing check, you can arrange through:  [facility] in [facility] or [place]. [phone]        For evaluation for dementia or memory loss, you can arrange through:  Dr. [facility]   [address] [place] [address]  [facility]  Phone- [phone]  or  You can try [facility]  [phone]  [phone]  [address].

## 2023-08-23 ENCOUNTER — TELEPHONE (OUTPATIENT)
Dept: FAMILY MEDICINE CLINIC | Age: 68
End: 2023-08-23

## 2023-08-23 DIAGNOSIS — I10 UNCONTROLLED HYPERTENSION: ICD-10-CM

## 2023-08-23 RX ORDER — AMLODIPINE BESYLATE 10 MG/1
10 TABLET ORAL DAILY
Qty: 90 TABLET | Refills: 3 | Status: SHIPPED | OUTPATIENT
Start: 2023-08-23

## 2023-08-23 NOTE — TELEPHONE ENCOUNTER
Patient called  Refill  90 day  [facility]  Amlodipine 10 mg     7/14/2023 last ov  Future Appointments   Date Time Provider [address]   10/20/2023  9:00 AM DO MENDOZA [address]   7/19/2024 10:00 AM DO [address]ENDOZA [address]

## 2023-10-09 ENCOUNTER — TELEMEDICINE (OUTPATIENT)
Dept: PRIMARY CARE CLINIC | Age: 68
End: 2023-10-09
Payer: MEDICARE

## 2023-10-09 DIAGNOSIS — J30.2 SEASONAL ALLERGIES: Primary | ICD-10-CM

## 2023-10-09 PROCEDURE — 1090F PRES/ABSN URINE INCON ASSESS: CPT | Performed by: NURSE PRACTITIONER

## 2023-10-09 PROCEDURE — 99212 OFFICE O/P EST SF 10 MIN: CPT | Performed by: NURSE PRACTITIONER

## 2023-10-09 PROCEDURE — G8427 DOCREV CUR MEDS BY ELIG CLIN: HCPCS | Performed by: NURSE PRACTITIONER

## 2023-10-09 PROCEDURE — 3017F COLORECTAL CA SCREEN DOC REV: CPT | Performed by: NURSE PRACTITIONER

## 2023-10-09 PROCEDURE — G8400 PT W/DXA NO RESULTS DOC: HCPCS | Performed by: NURSE PRACTITIONER

## 2023-10-09 PROCEDURE — 1123F ACP DISCUSS/DSCN MKR DOCD: CPT | Performed by: NURSE PRACTITIONER

## 2023-10-09 RX ORDER — GUAIFENESIN 600 MG/1
600 TABLET, EXTENDED RELEASE ORAL 2 TIMES DAILY
Qty: 14 TABLET | Refills: 0 | Status: SHIPPED | OUTPATIENT
Start: 2023-10-09 | End: 2023-10-16

## 2023-10-09 ASSESSMENT — ENCOUNTER SYMPTOMS: COUGH: 1

## 2023-10-09 NOTE — PROGRESS NOTES
Lon Correa (:  1955) is a Established patient, here for evaluation of the following: Allergies       Assessment & Plan:  Below is the assessment and plan developed based on review of pertinent history, physical exam, labs, studies, and medications. 1. Seasonal allergies  Antihistamine of choice- Allegra, Zyrtec, Claritin  Mucinex may provide symptom relief  Sinus Flushing 2x day as able followed by nasal steroid inhalation as prescribed  Push fluids  Tylenol/Motrin for discomfort and fever    -     guaiFENesin (MUCINEX) 600 MG extended release tablet; Take 1 tablet by mouth 2 times daily for 7 days, Disp-14 tablet, R-0Normal    No follow-ups on file. The patient would benefit from future follow up with their usual PCP. As of the end of their Virtualist Visit today, follow up visit status is as follows: Patient to follow up as previously instructed by PCP      Subjective:   Patient has congestion and post nasal drip with a cough. She denies fever, aches, sinus pressure, and headaches. She had a negative covid test, however a line showed up after 20 minutes, and there was not one at the 15 min andre. . She has been ill for 6 days, so even if she is covid positive she is out of window for paxlovid. This is likely allergies. Sending in mucinex and advised she start nasal washes. Review of Systems   HENT:  Positive for congestion and postnasal drip. Ears clogged   Eyes:         Watering eyes   Respiratory:  Positive for cough.         Objective:  Patient-Reported Vitals  No data recorded         2023     8:30 AM   Patient-Reported Vitals   Patient-Reported Weight 140lb   Patient-Reported Height 5f1   Patient-Reported Systolic 134 mmHg   Patient-Reported Diastolic 60 mmHg        Physical Exam:  [INSTRUCTIONS:  \"[x]\" Indicates a positive item  \"[]\" Indicates a negative item  -- DELETE ALL ITEMS NOT EXAMINED]    Constitutional: [x] Appears well-developed and well-nourished [x] No apparent

## 2023-10-20 ENCOUNTER — OFFICE VISIT (OUTPATIENT)
Dept: FAMILY MEDICINE CLINIC | Age: 68
End: 2023-10-20

## 2023-10-20 VITALS
BODY MASS INDEX: 27.75 KG/M2 | HEART RATE: 72 BPM | DIASTOLIC BLOOD PRESSURE: 74 MMHG | RESPIRATION RATE: 14 BRPM | TEMPERATURE: 97.1 F | WEIGHT: 147 LBS | HEIGHT: 61 IN | SYSTOLIC BLOOD PRESSURE: 124 MMHG | OXYGEN SATURATION: 98 %

## 2023-10-20 DIAGNOSIS — Z12.31 ENCOUNTER FOR SCREENING MAMMOGRAM FOR MALIGNANT NEOPLASM OF BREAST: ICD-10-CM

## 2023-10-20 DIAGNOSIS — I10 ESSENTIAL HYPERTENSION: Primary | ICD-10-CM

## 2023-10-20 ASSESSMENT — PATIENT HEALTH QUESTIONNAIRE - PHQ9
1. LITTLE INTEREST OR PLEASURE IN DOING THINGS: 0
SUM OF ALL RESPONSES TO PHQ QUESTIONS 1-9: 0
2. FEELING DOWN, DEPRESSED OR HOPELESS: 0
SUM OF ALL RESPONSES TO PHQ QUESTIONS 1-9: 0
SUM OF ALL RESPONSES TO PHQ9 QUESTIONS 1 & 2: 0

## 2023-12-04 ENCOUNTER — TELEPHONE (OUTPATIENT)
Dept: FAMILY MEDICINE CLINIC | Age: 68
End: 2023-12-04

## 2023-12-04 NOTE — TELEPHONE ENCOUNTER
Pt called asking if Dr Rose Landers could put an order in for her DEXA the last one was 5/2014. Please advise.

## 2023-12-05 ENCOUNTER — HOSPITAL ENCOUNTER (OUTPATIENT)
Dept: MAMMOGRAPHY | Age: 68
Discharge: HOME | End: 2023-12-05
Attending: FAMILY MEDICINE
Payer: MEDICARE

## 2023-12-05 VITALS — HEIGHT: 61 IN | BODY MASS INDEX: 27 KG/M2 | WEIGHT: 143 LBS

## 2023-12-05 DIAGNOSIS — Z12.31 ENCOUNTER FOR SCREENING MAMMOGRAM FOR MALIGNANT NEOPLASM OF BREAST: ICD-10-CM

## 2023-12-05 PROCEDURE — 77063 BREAST TOMOSYNTHESIS BI: CPT

## 2023-12-06 DIAGNOSIS — Z78.0 POSTMENOPAUSAL: Primary | ICD-10-CM

## 2023-12-15 ENCOUNTER — HOSPITAL ENCOUNTER (OUTPATIENT)
Dept: GENERAL RADIOLOGY | Age: 68
Discharge: HOME | End: 2023-12-15
Payer: MEDICARE

## 2023-12-15 DIAGNOSIS — Z78.0 POSTMENOPAUSAL: ICD-10-CM

## 2023-12-15 PROCEDURE — 77080 DXA BONE DENSITY AXIAL: CPT

## 2023-12-27 ENCOUNTER — TELEMEDICINE (OUTPATIENT)
Dept: FAMILY MEDICINE CLINIC | Age: 68
End: 2023-12-27
Payer: MEDICARE

## 2023-12-27 DIAGNOSIS — M81.0 OSTEOPOROSIS WITHOUT CURRENT PATHOLOGICAL FRACTURE, UNSPECIFIED OSTEOPOROSIS TYPE: Primary | ICD-10-CM

## 2023-12-27 PROCEDURE — G8399 PT W/DXA RESULTS DOCUMENT: HCPCS | Performed by: FAMILY MEDICINE

## 2023-12-27 PROCEDURE — 3017F COLORECTAL CA SCREEN DOC REV: CPT | Performed by: FAMILY MEDICINE

## 2023-12-27 PROCEDURE — G8427 DOCREV CUR MEDS BY ELIG CLIN: HCPCS | Performed by: FAMILY MEDICINE

## 2023-12-27 PROCEDURE — 99213 OFFICE O/P EST LOW 20 MIN: CPT | Performed by: FAMILY MEDICINE

## 2023-12-27 PROCEDURE — 1123F ACP DISCUSS/DSCN MKR DOCD: CPT | Performed by: FAMILY MEDICINE

## 2023-12-27 PROCEDURE — 1090F PRES/ABSN URINE INCON ASSESS: CPT | Performed by: FAMILY MEDICINE

## 2023-12-27 RX ORDER — ALENDRONATE SODIUM 70 MG/1
70 TABLET ORAL
Qty: 12 TABLET | Refills: 3 | Status: SHIPPED | OUTPATIENT
Start: 2023-12-27

## 2023-12-27 NOTE — PROGRESS NOTES
TELEHEALTH EVALUATION   HPI:  Jarrod Greer (:  1955) is a 79 y.o. female,  here for evaluation of the following chief complaint(s):  Osteoporosis (Wants to discuss diagnosis of this and medications)      ASSESSMENT/PLAN:   Diagnosis Orders   1. Osteoporosis without current pathological fracture, unspecified osteoporosis type          Gaby Cameron was seen today for osteoporosis. Diagnoses and all orders for this visit:    Osteoporosis without current pathological fracture, new  Discussed, will begin treatment:  Recheck dexa in 1 yr    -     alendronate (FOSAMAX) 70 MG tablet; Take 1 tablet by mouth every 7 days          SUBJECTIVE/OBJECTIVE:  HPI  No recent fractures, no known hx of osteoporosis in family  Recent DEXA shows osteoporosis, new. Narrative & Impression  EXAMINATION:  BONE DENSITOMETRY     12/15/2023 11:20 am     TECHNIQUE:  A bone density dual x-ray absorptiometry (DXA) scan was performed of the  lumbar spine and bilateral hips on a [employer] System. COMPARISON:  Statistical comparison could not be made to the prior exam due to dissimilar  scan type/analysis methods. HISTORY:  ORDERING SYSTEM PROVIDED HISTORY: Postmenopausal  TECHNOLOGIST PROVIDED HISTORY:  Reason for exam:->  Reason for Exam: Postmenopausal     Gender: F     Age: 80 y/o     FINDINGS:  LUMBAR SPINE: L1-L4     BMD: 0.738 g/cm2     T-score: -2.8     Z-score: -0.9     LEFT TOTAL HIP:     BMD: 0.780 g/cm2     T-score: -1.3     Z-score: 0.0     LEFT FEMORAL NECK:  BMD: 0.590 g/cm2  T-score: -2.3     Z-score: -0.7     RIGHT TOTAL HIP:  BMD: 0.725 g/cm2  T-score: -1.8  Z-score: -0.5     RIGHT FEMORAL NECK:  BMD: 0.563 g/cm2  T-score: -2.6  Z-score: -1.0     FRAX:     Not Indicated. IMPRESSION:  Osteoporosis by WHO criteria. RECOMMENDATIONS:  1. All patients should optimize their calcium and vitamin D intake.      2. Consider FDA-approved medical therapies in postmenopausal women and men  aged 48 years and older, based on

## 2024-04-17 ENCOUNTER — TELEPHONE (OUTPATIENT)
Dept: FAMILY MEDICINE CLINIC | Age: 69
End: 2024-04-17

## 2024-04-17 ENCOUNTER — OFFICE VISIT (OUTPATIENT)
Dept: FAMILY MEDICINE CLINIC | Age: 69
End: 2024-04-17
Payer: MEDICARE

## 2024-04-17 VITALS
TEMPERATURE: 97.1 F | HEART RATE: 78 BPM | OXYGEN SATURATION: 98 % | SYSTOLIC BLOOD PRESSURE: 120 MMHG | DIASTOLIC BLOOD PRESSURE: 70 MMHG | BODY MASS INDEX: 27.96 KG/M2 | WEIGHT: 148 LBS | RESPIRATION RATE: 16 BRPM

## 2024-04-17 DIAGNOSIS — I10 ESSENTIAL HYPERTENSION: ICD-10-CM

## 2024-04-17 DIAGNOSIS — M79.672 LEFT FOOT PAIN: Primary | ICD-10-CM

## 2024-04-17 PROCEDURE — 3078F DIAST BP <80 MM HG: CPT | Performed by: STUDENT IN AN ORGANIZED HEALTH CARE EDUCATION/TRAINING PROGRAM

## 2024-04-17 PROCEDURE — 3017F COLORECTAL CA SCREEN DOC REV: CPT | Performed by: STUDENT IN AN ORGANIZED HEALTH CARE EDUCATION/TRAINING PROGRAM

## 2024-04-17 PROCEDURE — 1036F TOBACCO NON-USER: CPT | Performed by: STUDENT IN AN ORGANIZED HEALTH CARE EDUCATION/TRAINING PROGRAM

## 2024-04-17 PROCEDURE — G8399 PT W/DXA RESULTS DOCUMENT: HCPCS | Performed by: STUDENT IN AN ORGANIZED HEALTH CARE EDUCATION/TRAINING PROGRAM

## 2024-04-17 PROCEDURE — 3074F SYST BP LT 130 MM HG: CPT | Performed by: STUDENT IN AN ORGANIZED HEALTH CARE EDUCATION/TRAINING PROGRAM

## 2024-04-17 PROCEDURE — G8417 CALC BMI ABV UP PARAM F/U: HCPCS | Performed by: STUDENT IN AN ORGANIZED HEALTH CARE EDUCATION/TRAINING PROGRAM

## 2024-04-17 PROCEDURE — G8427 DOCREV CUR MEDS BY ELIG CLIN: HCPCS | Performed by: STUDENT IN AN ORGANIZED HEALTH CARE EDUCATION/TRAINING PROGRAM

## 2024-04-17 PROCEDURE — 1090F PRES/ABSN URINE INCON ASSESS: CPT | Performed by: STUDENT IN AN ORGANIZED HEALTH CARE EDUCATION/TRAINING PROGRAM

## 2024-04-17 PROCEDURE — 1123F ACP DISCUSS/DSCN MKR DOCD: CPT | Performed by: STUDENT IN AN ORGANIZED HEALTH CARE EDUCATION/TRAINING PROGRAM

## 2024-04-17 PROCEDURE — 99213 OFFICE O/P EST LOW 20 MIN: CPT | Performed by: STUDENT IN AN ORGANIZED HEALTH CARE EDUCATION/TRAINING PROGRAM

## 2024-04-17 SDOH — ECONOMIC STABILITY: FOOD INSECURITY: WITHIN THE PAST 12 MONTHS, YOU WORRIED THAT YOUR FOOD WOULD RUN OUT BEFORE YOU GOT MONEY TO BUY MORE.: NEVER TRUE

## 2024-04-17 SDOH — ECONOMIC STABILITY: FOOD INSECURITY: WITHIN THE PAST 12 MONTHS, THE FOOD YOU BOUGHT JUST DIDN'T LAST AND YOU DIDN'T HAVE MONEY TO GET MORE.: NEVER TRUE

## 2024-04-17 SDOH — ECONOMIC STABILITY: INCOME INSECURITY: HOW HARD IS IT FOR YOU TO PAY FOR THE VERY BASICS LIKE FOOD, HOUSING, MEDICAL CARE, AND HEATING?: NOT VERY HARD

## 2024-04-17 ASSESSMENT — PATIENT HEALTH QUESTIONNAIRE - PHQ9
2. FEELING DOWN, DEPRESSED OR HOPELESS: NOT AT ALL
SUM OF ALL RESPONSES TO PHQ9 QUESTIONS 1 & 2: 0
SUM OF ALL RESPONSES TO PHQ QUESTIONS 1-9: 0
2. FEELING DOWN, DEPRESSED OR HOPELESS: NOT AT ALL
SUM OF ALL RESPONSES TO PHQ QUESTIONS 1-9: 0
SUM OF ALL RESPONSES TO PHQ QUESTIONS 1-9: 0
1. LITTLE INTEREST OR PLEASURE IN DOING THINGS: NOT AT ALL
1. LITTLE INTEREST OR PLEASURE IN DOING THINGS: NOT AT ALL
SUM OF ALL RESPONSES TO PHQ9 QUESTIONS 1 & 2: 0
SUM OF ALL RESPONSES TO PHQ QUESTIONS 1-9: 0

## 2024-04-17 NOTE — PROGRESS NOTES
[facility]  [address]  [address]  [place], [place] [address]  Tel: [phone]      2024   SUBJECTIVE/OBJECTIVE  HPI    Carol Dasilva (:  1955) is a 68 y.o. female, here for evaluation of the following medical concerns:  Chief Complaint   Patient presents with    Foot Pain     L foot pain   No known injury   Ball of foot      Patient is a 68 y.o. female  has a past medical history of Anxiety, Broken ankle, Hypertension, Osteopenia, and Reflux. who presents with left foot pain.    Foot Pain   The pain is present in the plantar side/instep left foot between second and third digit. This is a new problem. Episode onset: 3 weeks ago. There has been no history of extremity trauma.  Years ago had injured her left ankle.  The problem occurs intermittently (Sharp pain for a few seconds, self resolves). The quality of the pain is described as sharp. Pain scale: None currently, at worst 7/10. The pain is severe. Pertinent negatives include no fever, inability to bear weight, itching, joint locking, joint swelling, limited range of motion, numbness, stiffness or tingling. She has tried NSAIDS for the symptoms. The treatment provided no relief. There is no history of diabetes, gout, osteoarthritis or rheumatoid arthritis.   Patient is fairly active, walks 45 minutes to an hour daily.  Usually walks on flat terrain.  Uses Hoka sneakers while walking, no recent changes in footwear.  Pain occurs at rest and with walking.      Hypertension: Currently taking amlodipine 10 mg daily.  No chest pain, shortness of breath or leg swelling      Review of Systems   Constitutional:  Negative for fever.   Neurological:  Negative for numbness.   Remaining as per HPI      Physical exam  /70 (Site: Left Upper Arm, Position: Sitting)   Pulse 78   Temp 97.1 °F (36.2 °C) (Temporal)   Resp 16   Wt 67.1 kg (148 lb)   LMP 1995   SpO2 98%   BMI 27.96 kg/m²   Physical Exam  Constitutional:

## 2024-04-17 NOTE — TELEPHONE ENCOUNTER
Patient called, she is having trouble with the ball of her left foot. She's having pain but no swelling. She would like a recommendation for a foot doctor, and wants to know if she would need a referral. Please advise.

## 2024-04-17 NOTE — PATIENT INSTRUCTIONS
Follow up with Podiatry   -- Consider foot inserts   - Tylenol and Ibuprofen as needed   -metatarsal pads

## 2024-05-15 DIAGNOSIS — I10 UNCONTROLLED HYPERTENSION: ICD-10-CM

## 2024-05-15 NOTE — TELEPHONE ENCOUNTER
Future Appointments   Date Time Provider Department Center   7/19/2024 10:00 AM eGm Emanuel DO [facility] [place] [facility]   8/6/2024 10:20 AM Pham Aburto DO [facility] [place] [facility]         LOV 12/27/2023

## 2024-05-17 RX ORDER — AMLODIPINE BESYLATE 10 MG/1
10 TABLET ORAL DAILY
Qty: 90 TABLET | Refills: 3 | Status: SHIPPED | OUTPATIENT
Start: 2024-05-17

## 2024-05-21 DIAGNOSIS — I10 UNCONTROLLED HYPERTENSION: ICD-10-CM

## 2024-05-21 RX ORDER — AMLODIPINE BESYLATE 10 MG/1
10 TABLET ORAL DAILY
Qty: 90 TABLET | Refills: 3 | Status: SHIPPED | OUTPATIENT
Start: 2024-05-21

## 2024-05-21 NOTE — TELEPHONE ENCOUNTER
12/27/2023    Future Appointments   Date Time Provider Department Center   7/19/2024 10:00 AM Gem Emanuel DO [facility] [place] [facility]   8/6/2024 10:20 AM Pham Aburto DO [facility] [place] [facility]

## 2024-07-16 SDOH — HEALTH STABILITY: PHYSICAL HEALTH: ON AVERAGE, HOW MANY MINUTES DO YOU ENGAGE IN EXERCISE AT THIS LEVEL?: 20 MIN

## 2024-07-16 SDOH — HEALTH STABILITY: PHYSICAL HEALTH: ON AVERAGE, HOW MANY DAYS PER WEEK DO YOU ENGAGE IN MODERATE TO STRENUOUS EXERCISE (LIKE A BRISK WALK)?: 4 DAYS

## 2024-07-16 ASSESSMENT — LIFESTYLE VARIABLES
HOW OFTEN DO YOU HAVE A DRINK CONTAINING ALCOHOL: 1
HOW MANY STANDARD DRINKS CONTAINING ALCOHOL DO YOU HAVE ON A TYPICAL DAY: 0
HOW OFTEN DO YOU HAVE A DRINK CONTAINING ALCOHOL: NEVER
HOW MANY STANDARD DRINKS CONTAINING ALCOHOL DO YOU HAVE ON A TYPICAL DAY: PATIENT DOES NOT DRINK
HOW OFTEN DO YOU HAVE SIX OR MORE DRINKS ON ONE OCCASION: 1

## 2024-07-16 ASSESSMENT — PATIENT HEALTH QUESTIONNAIRE - PHQ9
SUM OF ALL RESPONSES TO PHQ QUESTIONS 1-9: 0
1. LITTLE INTEREST OR PLEASURE IN DOING THINGS: NOT AT ALL
SUM OF ALL RESPONSES TO PHQ9 QUESTIONS 1 & 2: 0
2. FEELING DOWN, DEPRESSED OR HOPELESS: NOT AT ALL
SUM OF ALL RESPONSES TO PHQ QUESTIONS 1-9: 0

## 2024-07-19 ENCOUNTER — OFFICE VISIT (OUTPATIENT)
Dept: FAMILY MEDICINE CLINIC | Age: 69
End: 2024-07-19
Payer: MEDICARE

## 2024-07-19 VITALS
OXYGEN SATURATION: 98 % | HEIGHT: 61 IN | DIASTOLIC BLOOD PRESSURE: 70 MMHG | HEART RATE: 68 BPM | WEIGHT: 147 LBS | BODY MASS INDEX: 27.75 KG/M2 | SYSTOLIC BLOOD PRESSURE: 120 MMHG | RESPIRATION RATE: 14 BRPM | TEMPERATURE: 97.2 F

## 2024-07-19 DIAGNOSIS — R53.83 TIREDNESS: ICD-10-CM

## 2024-07-19 DIAGNOSIS — E78.00 HYPERCHOLESTEREMIA: ICD-10-CM

## 2024-07-19 DIAGNOSIS — Z00.00 MEDICARE ANNUAL WELLNESS VISIT, SUBSEQUENT: Primary | ICD-10-CM

## 2024-07-19 LAB
ALBUMIN SERPL-MCNC: 4.9 G/DL (ref 3.4–5)
ALBUMIN/GLOB SERPL: 2.2 {RATIO} (ref 1.1–2.2)
ALP SERPL-CCNC: 83 U/L (ref 40–129)
ALT SERPL-CCNC: 48 U/L (ref 10–40)
ANION GAP SERPL CALCULATED.3IONS-SCNC: 11 MMOL/L (ref 3–16)
AST SERPL-CCNC: 27 U/L (ref 15–37)
BILIRUB SERPL-MCNC: 0.4 MG/DL (ref 0–1)
BUN SERPL-MCNC: 14 MG/DL (ref 7–20)
CALCIUM SERPL-MCNC: 9.9 MG/DL (ref 8.3–10.6)
CHLORIDE SERPL-SCNC: 106 MMOL/L (ref 99–110)
CHOLEST SERPL-MCNC: 216 MG/DL (ref 0–199)
CO2 SERPL-SCNC: 25 MMOL/L (ref 21–32)
CREAT SERPL-MCNC: 0.7 MG/DL (ref 0.6–1.2)
DEPRECATED RDW RBC AUTO: 13.5 % (ref 12.4–15.4)
GFR SERPLBLD CREATININE-BSD FMLA CKD-EPI: >90 ML/MIN/{1.73_M2}
GLUCOSE SERPL-MCNC: 103 MG/DL (ref 70–99)
HCT VFR BLD AUTO: 40 % (ref 36–48)
HDLC SERPL-MCNC: 55 MG/DL (ref 40–60)
HGB BLD-MCNC: 13.6 G/DL (ref 12–16)
LDLC SERPL CALC-MCNC: 118 MG/DL
MCH RBC QN AUTO: 30.4 PG (ref 26–34)
MCHC RBC AUTO-ENTMCNC: 34 G/DL (ref 31–36)
MCV RBC AUTO: 89.5 FL (ref 80–100)
PLATELET # BLD AUTO: 321 K/UL (ref 135–450)
PMV BLD AUTO: 7.6 FL (ref 5–10.5)
POTASSIUM SERPL-SCNC: 4.1 MMOL/L (ref 3.5–5.1)
PROT SERPL-MCNC: 7.1 G/DL (ref 6.4–8.2)
RBC # BLD AUTO: 4.47 M/UL (ref 4–5.2)
SODIUM SERPL-SCNC: 142 MMOL/L (ref 136–145)
TRIGL SERPL-MCNC: 215 MG/DL (ref 0–150)
TSH SERPL DL<=0.005 MIU/L-ACNC: 1.72 UIU/ML (ref 0.27–4.2)
VLDLC SERPL CALC-MCNC: 43 MG/DL
WBC # BLD AUTO: 5.4 K/UL (ref 4–11)

## 2024-07-19 PROCEDURE — 3078F DIAST BP <80 MM HG: CPT | Performed by: FAMILY MEDICINE

## 2024-07-19 PROCEDURE — 1123F ACP DISCUSS/DSCN MKR DOCD: CPT | Performed by: FAMILY MEDICINE

## 2024-07-19 PROCEDURE — 3017F COLORECTAL CA SCREEN DOC REV: CPT | Performed by: FAMILY MEDICINE

## 2024-07-19 PROCEDURE — G0439 PPPS, SUBSEQ VISIT: HCPCS | Performed by: FAMILY MEDICINE

## 2024-07-19 PROCEDURE — 3074F SYST BP LT 130 MM HG: CPT | Performed by: FAMILY MEDICINE

## 2024-07-19 NOTE — PROGRESS NOTES
Gem Emanuel DO       CareTeam (Including outside providers/suppliers regularly involved in providing care):   Patient Care Team:  Gem Emanuel DO as PCP - General (Family Medicine)  Gem Emanuel DO as PCP - Empaneled Provider      Reviewed and updated this visit:  Tobacco  Allergies  Meds  Med Hx  Surg Hx  Soc Hx  Fam Hx

## 2024-07-19 NOTE — PATIENT INSTRUCTIONS
bath  Install grab bars and nonskid mats inside and outside your shower or tub and near the toilet and sinks.  Use shower chairs and bath benches.  Use a hand-held shower head that will allow you to sit while showering.  Get into a tub or shower by putting the weaker leg in first. Get out of a tub or shower with your strong side first.  Repair loose toilet seats and consider installing a raised toilet seat to make getting on and off the toilet easier.  Keep your bathroom door unlocked while you are in the shower.          For a hearing check, you can arrange through:  [facility] in [facility] or [phone]        For evaluation for dementia or memory loss, you can arrange through:  Dr. Mela Mccann   [facility]   [address], [address]  [place], [place] [address]  Phone- [phone]  or  You can try [facility]  [phone]  [address]  [facility] [address]          [facility] can help seniors with transportation and other needs.  You can call them at [phone]      For your weight, exercise 30 minutes 5 times weekly and improve the types of food you eat:    Protein  Best options: The American Diabetes Association (ADA) recommends lean proteins low in saturated fat, like fish or turkey. Aim for two servings of seafood each week; some fish, like salmon, have the added benefit of containing heart healthy omega-3 fats. For a vegetarian protein source, experiment with the wide variety of beans. Nuts, which are protein and healthy fats powerhouses, are also a choice.  Worst options: Processed deli meats and hot dogs have high amounts of fat along with lots of sodium, which can increase the risk of high blood pressure. Heart attack and stroke are two common complications of diabetes, so keeping blood pressure in check is important.  Grains  Best options: When choosing grains, make sure they’re whole.Decrease the amount of foods which contain

## 2024-07-23 RX ORDER — SCOLOPAMINE TRANSDERMAL SYSTEM 1 MG/1
1 PATCH, EXTENDED RELEASE TRANSDERMAL
Qty: 3 PATCH | Refills: 0 | Status: SHIPPED | OUTPATIENT
Start: 2024-07-23

## 2024-08-03 SDOH — HEALTH STABILITY: PHYSICAL HEALTH: ON AVERAGE, HOW MANY DAYS PER WEEK DO YOU ENGAGE IN MODERATE TO STRENUOUS EXERCISE (LIKE A BRISK WALK)?: 3 DAYS

## 2024-08-06 ENCOUNTER — OFFICE VISIT (OUTPATIENT)
Dept: FAMILY MEDICINE CLINIC | Age: 69
End: 2024-08-06
Payer: MEDICARE

## 2024-08-06 VITALS
WEIGHT: 147.6 LBS | BODY MASS INDEX: 27.87 KG/M2 | OXYGEN SATURATION: 98 % | HEART RATE: 78 BPM | HEIGHT: 61 IN | SYSTOLIC BLOOD PRESSURE: 131 MMHG | DIASTOLIC BLOOD PRESSURE: 73 MMHG

## 2024-08-06 DIAGNOSIS — Z76.89 ENCOUNTER TO ESTABLISH CARE: Primary | ICD-10-CM

## 2024-08-06 DIAGNOSIS — I10 ESSENTIAL HYPERTENSION: ICD-10-CM

## 2024-08-06 DIAGNOSIS — M81.0 OSTEOPOROSIS WITHOUT CURRENT PATHOLOGICAL FRACTURE, UNSPECIFIED OSTEOPOROSIS TYPE: ICD-10-CM

## 2024-08-06 DIAGNOSIS — J30.9 ALLERGIC SINUSITIS: ICD-10-CM

## 2024-08-06 PROCEDURE — G8417 CALC BMI ABV UP PARAM F/U: HCPCS | Performed by: SURGERY

## 2024-08-06 PROCEDURE — 3017F COLORECTAL CA SCREEN DOC REV: CPT | Performed by: SURGERY

## 2024-08-06 PROCEDURE — 1036F TOBACCO NON-USER: CPT | Performed by: SURGERY

## 2024-08-06 PROCEDURE — 3075F SYST BP GE 130 - 139MM HG: CPT | Performed by: SURGERY

## 2024-08-06 PROCEDURE — G8427 DOCREV CUR MEDS BY ELIG CLIN: HCPCS | Performed by: SURGERY

## 2024-08-06 PROCEDURE — G8399 PT W/DXA RESULTS DOCUMENT: HCPCS | Performed by: SURGERY

## 2024-08-06 PROCEDURE — 3078F DIAST BP <80 MM HG: CPT | Performed by: SURGERY

## 2024-08-06 PROCEDURE — 1090F PRES/ABSN URINE INCON ASSESS: CPT | Performed by: SURGERY

## 2024-08-06 PROCEDURE — 99214 OFFICE O/P EST MOD 30 MIN: CPT | Performed by: SURGERY

## 2024-08-06 PROCEDURE — 1123F ACP DISCUSS/DSCN MKR DOCD: CPT | Performed by: SURGERY

## 2024-08-06 RX ORDER — CETIRIZINE HYDROCHLORIDE 5 MG/1
5 TABLET ORAL DAILY
COMMUNITY

## 2024-08-06 ASSESSMENT — ENCOUNTER SYMPTOMS
SHORTNESS OF BREATH: 0
NAUSEA: 0
COUGH: 0
DIARRHEA: 0
SORE THROAT: 0
ABDOMINAL PAIN: 0
CONSTIPATION: 0

## 2024-08-06 NOTE — PROGRESS NOTES
Insufficiently Active (8/3/2024)    Exercise Vital Sign     Days of Exercise per Week: 3 days     Minutes of Exercise per Session: 20 min   Stress: Not on file   Social Connections: Not on file   Intimate Partner Violence: Not on file   Housing Stability: Unknown (4/17/2024)    Housing Stability Vital Sign     Unable to Pay for Housing in the Last Year: Not on file     Number of Places Lived in the Last Year: Not on file     Unstable Housing in the Last Year: No       Family History   Problem Relation Age of Onset    Cancer Mother 62        shoulder    Diabetes Father     Other Brother         sinus infection went to his brain    No Known Problems Paternal Grandfather     No Known Problems Paternal Grandmother     No Known Problems Maternal Grandmother     No Known Problems Maternal Grandfather     No Known Problems Sister     No Known Problems Brother     No Known Problems Maternal Uncle     No Known Problems Paternal Uncle     No Known Problems Paternal Aunt     No Known Problems Daughter     No Known Problems Son        Current Outpatient Medications   Medication Sig Dispense Refill    cetirizine (ZYRTEC) 5 MG tablet Take 1 tablet by mouth daily      Multiple Minerals-Vitamins (CALCIUM & VIT D3 BONE HEALTH PO) Take by mouth      amLODIPine (NORVASC) 10 MG tablet TAKE 1 TABLET BY MOUTH DAILY 90 tablet 3    alendronate (FOSAMAX) 70 MG tablet Take 1 tablet by mouth every 7 days 12 tablet 3    montelukast (SINGULAIR) 10 MG tablet Take 1 tablet by mouth nightly      scopolamine (TRANSDERM-SCOP) transdermal patch Place 1 patch onto the skin every 72 hours As needed for motion sickness (Patient not taking: Reported on 8/6/2024) 3 patch 0     No current facility-administered medications for this visit.       Allergies   Allergen Reactions    Latex Other (See Comments)     Yeast infection    Codeine Nausea And Vomiting       Orders Only on 07/19/2024   Component Date Value Ref Range Status    Cholesterol, Total 07/19/2024

## 2024-09-05 RX ORDER — ALENDRONATE SODIUM 70 MG/1
70 TABLET ORAL
Qty: 12 TABLET | Refills: 3 | Status: SHIPPED | OUTPATIENT
Start: 2024-09-05

## 2024-10-07 ENCOUNTER — OFFICE VISIT (OUTPATIENT)
Dept: PRIMARY CARE CLINIC | Age: 69
End: 2024-10-07

## 2024-10-07 VITALS
HEART RATE: 70 BPM | BODY MASS INDEX: 27.96 KG/M2 | DIASTOLIC BLOOD PRESSURE: 70 MMHG | RESPIRATION RATE: 16 BRPM | OXYGEN SATURATION: 97 % | SYSTOLIC BLOOD PRESSURE: 130 MMHG | TEMPERATURE: 98 F | WEIGHT: 148 LBS

## 2024-10-07 DIAGNOSIS — H10.9 BACTERIAL CONJUNCTIVITIS OF RIGHT EYE: Primary | ICD-10-CM

## 2024-10-07 RX ORDER — ERYTHROMYCIN 5 MG/G
OINTMENT OPHTHALMIC
Qty: 1 G | Refills: 0 | Status: SHIPPED | OUTPATIENT
Start: 2024-10-07

## 2024-10-07 ASSESSMENT — ENCOUNTER SYMPTOMS
EYE ITCHING: 1
SINUS PRESSURE: 0
VOMITING: 0
SORE THROAT: 0
SHORTNESS OF BREATH: 0
RHINORRHEA: 1
NAUSEA: 0
EYE PAIN: 1
EYE REDNESS: 1
EYE DISCHARGE: 1
COUGH: 0
SINUS PAIN: 0

## 2024-10-07 NOTE — PROGRESS NOTES
[facility]  10/7/2024    Carol Dasilva (:  1955) is a 69 y.o. female, here for evaluation of the following medical concerns:    Chief Complaint   Patient presents with    Eye Pain     Itchy. draining        ASSESSMENT/ PLAN  1. Bacterial conjunctivitis of right eye  - erythromycin (ROMYCIN) 5 MG/GM ophthalmic ointment; Apply one ribbon in eye(s), 4 times a day for 10 days  Dispense: 1 g; Refill: 0     - Supportive care measures discussed.    Return if symptoms worsen or fail to improve.    HPI  Here today with acute eye issue.  +eye redness; right eye. Itchy. \"Stings.\"  +woke up with crusts this morning  +slight rhinorrhea; clear.  No sore throat, etc. No cough.  Denies fever/chills.    Tried some Visine OTC drops.  Hx environmental allergies. Takes Zyrtec & Singulair. Weekly allergy shots at [facility].    Denies any known sick contacts.      ROS  Review of Systems   Constitutional:  Negative for appetite change, chills, fatigue, fever and unexpected weight change.   HENT:  Positive for rhinorrhea. Negative for congestion, ear discharge, ear pain, sinus pressure, sinus pain and sore throat.    Eyes:  Positive for pain, discharge, redness and itching.   Respiratory:  Negative for cough and shortness of breath.    Cardiovascular:  Negative for chest pain, palpitations and leg swelling.   Gastrointestinal:  Negative for nausea and vomiting.   Genitourinary:  Negative for frequency.   Musculoskeletal:  Negative for myalgias.   Skin: Negative.    Neurological:  Negative for dizziness, weakness and headaches.   Hematological:  Negative for adenopathy.   Psychiatric/Behavioral: Negative.         HISTORIES  Current Outpatient Medications on File Prior to Visit   Medication Sig Dispense Refill    alendronate (FOSAMAX) 70 MG tablet Take 1 tablet by mouth every 7 days 12 tablet 3    cetirizine (ZYRTEC) 5 MG tablet Take 1 tablet by mouth daily      Multiple Minerals-Vitamins

## 2024-11-26 ENCOUNTER — TELEMEDICINE (OUTPATIENT)
Age: 69
End: 2024-11-26
Payer: MEDICARE

## 2024-11-26 DIAGNOSIS — J06.9 VIRAL URI WITH COUGH: Primary | ICD-10-CM

## 2024-11-26 DIAGNOSIS — I10 PRIMARY HYPERTENSION: ICD-10-CM

## 2024-11-26 PROCEDURE — 3017F COLORECTAL CA SCREEN DOC REV: CPT | Performed by: NURSE PRACTITIONER

## 2024-11-26 PROCEDURE — 1160F RVW MEDS BY RX/DR IN RCRD: CPT | Performed by: NURSE PRACTITIONER

## 2024-11-26 PROCEDURE — G8484 FLU IMMUNIZE NO ADMIN: HCPCS | Performed by: NURSE PRACTITIONER

## 2024-11-26 PROCEDURE — 1036F TOBACCO NON-USER: CPT | Performed by: NURSE PRACTITIONER

## 2024-11-26 PROCEDURE — G8399 PT W/DXA RESULTS DOCUMENT: HCPCS | Performed by: NURSE PRACTITIONER

## 2024-11-26 PROCEDURE — 1090F PRES/ABSN URINE INCON ASSESS: CPT | Performed by: NURSE PRACTITIONER

## 2024-11-26 PROCEDURE — G8427 DOCREV CUR MEDS BY ELIG CLIN: HCPCS | Performed by: NURSE PRACTITIONER

## 2024-11-26 PROCEDURE — G8417 CALC BMI ABV UP PARAM F/U: HCPCS | Performed by: NURSE PRACTITIONER

## 2024-11-26 PROCEDURE — 1123F ACP DISCUSS/DSCN MKR DOCD: CPT | Performed by: NURSE PRACTITIONER

## 2024-11-26 PROCEDURE — 1159F MED LIST DOCD IN RCRD: CPT | Performed by: NURSE PRACTITIONER

## 2024-11-26 PROCEDURE — 99213 OFFICE O/P EST LOW 20 MIN: CPT | Performed by: NURSE PRACTITIONER

## 2024-11-26 RX ORDER — DEXTROMETHORPHAN HYDROBROMIDE AND PROMETHAZINE HYDROCHLORIDE 15; 6.25 MG/5ML; MG/5ML
5 SYRUP ORAL NIGHTLY PRN
Qty: 118 ML | Refills: 0 | Status: SHIPPED | OUTPATIENT
Start: 2024-11-26

## 2024-11-26 RX ORDER — BENZONATATE 200 MG/1
200 CAPSULE ORAL 3 TIMES DAILY PRN
Qty: 21 CAPSULE | Refills: 0 | Status: SHIPPED | OUTPATIENT
Start: 2024-11-26 | End: 2024-12-03

## 2024-11-26 ASSESSMENT — ENCOUNTER SYMPTOMS
SHORTNESS OF BREATH: 0
SINUS PAIN: 0
SORE THROAT: 1
COUGH: 1
VOICE CHANGE: 1
WHEEZING: 0

## 2024-11-26 NOTE — PROGRESS NOTES
Carol Dasilva (:  1955) is a Established patient, here for evaluation of the following:    Cold Symptoms (X5 days, deep dry cough, post nasal drip, sore throat, headache, hoarse voice. Taking coricidin hbp cough/cold which has not helped.)       Assessment & Plan:  Below is the assessment and plan developed based on review of pertinent history, physical exam, labs, studies, and medications.  1. Viral URI with cough  -     benzonatate (TESSALON) 200 MG capsule; Take 1 capsule by mouth 3 times daily as needed for Cough, Disp-21 capsule, R-0Normal  -     promethazine-dextromethorphan (PROMETHAZINE-DM) 6.25-15 MG/5ML syrup; Take 5 mLs by mouth nightly as needed for Cough, Disp-118 mL, R-0Normal    New, Acute. Continue with coricedin for congestion, use humidifier in bedroom, steamy shower, warm drinks. Follow up if symptoms worsen or fail to improve.  The patient was educated on reasons to seek urgent medical care including chest pain, shortness of breath or difficulty breathing at rest, severe pain, fever >102 not controlled by medication, unrelenting vomiting or diarrhea, blue-tinged lips or nailbeds, and severe weakness or confusion. Patient verbalized understanding  2. Primary hypertension  Chronic, controlled. Continue with amlodipine. Avoid systemic decongestants. Use coricedin and afrin, saline sinus irrigation. F/u with pcp as planned.    The patient would benefit from future follow up with their usual PCP. As of the end of their Virtualist Visit today, follow up visit status is as follows: Patient to follow up as previously instructed by PCP      Return if symptoms worsen or fail to improve.    Subjective:   Cold Symptoms (X5 days, deep dry cough, post nasal drip, sore throat, headache, hoarse voice. Taking coricidin hbp cough/cold which has not helped.)      Cold Symptoms   This is a new problem. The current episode started in the past 7 days. The problem has been gradually improving. There has been no

## 2024-12-06 ENCOUNTER — HOSPITAL ENCOUNTER (OUTPATIENT)
Dept: MAMMOGRAPHY | Age: 69
Discharge: HOME | End: 2024-12-06
Payer: MEDICARE

## 2024-12-06 VITALS — BODY MASS INDEX: 26.62 KG/M2 | WEIGHT: 141 LBS | HEIGHT: 61 IN

## 2024-12-06 DIAGNOSIS — Z12.31 ENCOUNTER FOR SCREENING MAMMOGRAM FOR BREAST CANCER: ICD-10-CM

## 2024-12-06 PROCEDURE — 77063 BREAST TOMOSYNTHESIS BI: CPT

## 2024-12-09 ENCOUNTER — TELEPHONE (OUTPATIENT)
Dept: MAMMOGRAPHY | Age: 69
End: 2024-12-09

## 2024-12-09 DIAGNOSIS — R92.8 ABNORMAL MAMMOGRAM: Primary | ICD-10-CM

## 2024-12-09 NOTE — TELEPHONE ENCOUNTER
Spoke with patient concerning results of screening mammogram and advised [facility]'s [facility] number to schedule diagnostic mammogram. Patient will call and schedule.

## 2024-12-26 ENCOUNTER — HOSPITAL ENCOUNTER (OUTPATIENT)
Dept: WOMENS IMAGING | Age: 69
Discharge: HOME | End: 2024-12-26
Payer: MEDICARE

## 2024-12-26 DIAGNOSIS — R92.8 ABNORMAL MAMMOGRAM: ICD-10-CM

## 2024-12-26 PROCEDURE — 76642 ULTRASOUND BREAST LIMITED: CPT

## 2024-12-26 PROCEDURE — G0279 TOMOSYNTHESIS, MAMMO: HCPCS

## 2025-05-08 DIAGNOSIS — I10 UNCONTROLLED HYPERTENSION: ICD-10-CM

## 2025-05-08 RX ORDER — AMLODIPINE BESYLATE 10 MG/1
10 TABLET ORAL DAILY
Qty: 90 TABLET | Refills: 3 | Status: SHIPPED | OUTPATIENT
Start: 2025-05-08

## 2025-05-08 NOTE — TELEPHONE ENCOUNTER
Future Appointments   Date Time Provider Department Center   8/12/2025  9:20 AM Pham Aburto DO [facility] E[facility] DEP     LOV 8/6/2024

## 2025-05-12 ENCOUNTER — HOSPITAL ENCOUNTER (INPATIENT)
Age: 70
LOS: 2 days | Discharge: HOME | DRG: 493 | End: 2025-05-14
Attending: EMERGENCY MEDICINE | Admitting: INTERNAL MEDICINE
Payer: MEDICARE

## 2025-05-12 ENCOUNTER — APPOINTMENT (OUTPATIENT)
Dept: GENERAL RADIOLOGY | Age: 70
DRG: 493 | End: 2025-05-12
Payer: MEDICARE

## 2025-05-12 DIAGNOSIS — S82.402A TIBIA/FIBULA FRACTURE, LEFT, CLOSED, INITIAL ENCOUNTER: Primary | ICD-10-CM

## 2025-05-12 DIAGNOSIS — S82.202A TIBIA/FIBULA FRACTURE, LEFT, CLOSED, INITIAL ENCOUNTER: Primary | ICD-10-CM

## 2025-05-12 PROBLEM — S82.252A CLOSED DISPLACED COMMINUTED FRACTURE OF SHAFT OF LEFT TIBIA: Status: ACTIVE | Noted: 2025-05-12

## 2025-05-12 LAB
ABO/RH: NORMAL
ALBUMIN SERPL-MCNC: 3.9 G/DL (ref 3.4–5)
ALBUMIN/GLOB SERPL: 1.3 {RATIO} (ref 1.1–2.2)
ALP SERPL-CCNC: 71 U/L (ref 40–129)
ALT SERPL-CCNC: 52 U/L (ref 10–40)
ANION GAP SERPL CALCULATED.3IONS-SCNC: 13 MMOL/L (ref 3–16)
ANTIBODY SCREEN: NORMAL
APTT BLD: 23.4 SEC (ref 22.1–36.4)
AST SERPL-CCNC: 29 U/L (ref 15–37)
BASE EXCESS BLDV CALC-SCNC: -3.8 MMOL/L (ref -3–3)
BASOPHILS # BLD: 0 K/UL (ref 0–0.2)
BASOPHILS NFR BLD: 0.4 %
BILIRUB SERPL-MCNC: 0.3 MG/DL (ref 0–1)
BILIRUB UR QL STRIP.AUTO: NEGATIVE
BUN SERPL-MCNC: 13 MG/DL (ref 7–20)
CALCIUM SERPL-MCNC: 8.4 MG/DL (ref 8.3–10.6)
CHLORIDE SERPL-SCNC: 107 MMOL/L (ref 99–110)
CK SERPL-CCNC: 102 U/L (ref 26–192)
CLARITY UR: CLEAR
CO2 BLDV-SCNC: 22 MMOL/L
CO2 SERPL-SCNC: 19 MMOL/L (ref 21–32)
COHGB MFR BLDV: 2.5 % (ref 0–1.5)
COLOR UR: YELLOW
CREAT SERPL-MCNC: 0.4 MG/DL (ref 0.6–1.2)
DEPRECATED RDW RBC AUTO: 14.2 % (ref 12.4–15.4)
EOSINOPHIL # BLD: 0 K/UL (ref 0–0.6)
EOSINOPHIL NFR BLD: 0.1 %
GFR SERPLBLD CREATININE-BSD FMLA CKD-EPI: >90 ML/MIN/{1.73_M2}
GLUCOSE SERPL-MCNC: 125 MG/DL (ref 70–99)
GLUCOSE UR STRIP.AUTO-MCNC: NEGATIVE MG/DL
HCO3 BLDV-SCNC: 20.5 MMOL/L (ref 23–29)
HCT VFR BLD AUTO: 38 % (ref 36–48)
HGB BLD-MCNC: 12.8 G/DL (ref 12–16)
HGB UR QL STRIP.AUTO: NEGATIVE
INR PPP: 0.91 (ref 0.85–1.15)
KETONES UR STRIP.AUTO-MCNC: ABNORMAL MG/DL
LACTATE BLDV-SCNC: 1.5 MMOL/L (ref 0.4–2)
LEUKOCYTE ESTERASE UR QL STRIP.AUTO: NEGATIVE
LYMPHOCYTES # BLD: 1.5 K/UL (ref 1–5.1)
LYMPHOCYTES NFR BLD: 16.1 %
MCH RBC QN AUTO: 30.3 PG (ref 26–34)
MCHC RBC AUTO-ENTMCNC: 33.7 G/DL (ref 31–36)
MCV RBC AUTO: 89.9 FL (ref 80–100)
METHGB MFR BLDV: 0.3 %
MONOCYTES # BLD: 0.3 K/UL (ref 0–1.3)
MONOCYTES NFR BLD: 3.5 %
NEUTROPHILS # BLD: 7.2 K/UL (ref 1.7–7.7)
NEUTROPHILS NFR BLD: 79.9 %
NITRITE UR QL STRIP.AUTO: NEGATIVE
NT-PROBNP SERPL-MCNC: <36 PG/ML (ref 0–124)
O2 THERAPY: ABNORMAL
PCO2 BLDV: 35.1 MMHG (ref 40–50)
PH BLDV: 7.38 [PH] (ref 7.35–7.45)
PH UR STRIP.AUTO: 7 [PH] (ref 5–8)
PLATELET # BLD AUTO: 286 K/UL (ref 135–450)
PMV BLD AUTO: 7.1 FL (ref 5–10.5)
PO2 BLDV: 51.9 MMHG (ref 25–40)
POTASSIUM SERPL-SCNC: 3.7 MMOL/L (ref 3.5–5.1)
PROT SERPL-MCNC: 6.8 G/DL (ref 6.4–8.2)
PROT UR STRIP.AUTO-MCNC: NEGATIVE MG/DL
PROTHROMBIN TIME: 12.4 SEC (ref 11.9–14.9)
RBC # BLD AUTO: 4.22 M/UL (ref 4–5.2)
SAO2 % BLDV: 87 %
SODIUM SERPL-SCNC: 139 MMOL/L (ref 136–145)
SP GR UR STRIP.AUTO: 1.01 (ref 1–1.03)
TROPONIN, HIGH SENSITIVITY: <6 NG/L (ref 0–14)
TROPONIN, HIGH SENSITIVITY: <6 NG/L (ref 0–14)
UA COMPLETE W REFLEX CULTURE PNL UR: ABNORMAL
UA DIPSTICK W REFLEX MICRO PNL UR: ABNORMAL
URN SPEC COLLECT METH UR: ABNORMAL
UROBILINOGEN UR STRIP-ACNC: 0.2 E.U./DL
WBC # BLD AUTO: 9 K/UL (ref 4–11)

## 2025-05-12 PROCEDURE — 80053 COMPREHEN METABOLIC PANEL: CPT

## 2025-05-12 PROCEDURE — 6370000000 HC RX 637 (ALT 250 FOR IP): Performed by: INTERNAL MEDICINE

## 2025-05-12 PROCEDURE — 85730 THROMBOPLASTIN TIME PARTIAL: CPT

## 2025-05-12 PROCEDURE — 73610 X-RAY EXAM OF ANKLE: CPT

## 2025-05-12 PROCEDURE — 99285 EMERGENCY DEPT VISIT HI MDM: CPT

## 2025-05-12 PROCEDURE — 73590 X-RAY EXAM OF LOWER LEG: CPT

## 2025-05-12 PROCEDURE — 82550 ASSAY OF CK (CPK): CPT

## 2025-05-12 PROCEDURE — 85025 COMPLETE CBC W/AUTO DIFF WBC: CPT

## 2025-05-12 PROCEDURE — 36415 COLL VENOUS BLD VENIPUNCTURE: CPT

## 2025-05-12 PROCEDURE — 86901 BLOOD TYPING SEROLOGIC RH(D): CPT

## 2025-05-12 PROCEDURE — 86850 RBC ANTIBODY SCREEN: CPT

## 2025-05-12 PROCEDURE — 96376 TX/PRO/DX INJ SAME DRUG ADON: CPT

## 2025-05-12 PROCEDURE — 82803 BLOOD GASES ANY COMBINATION: CPT

## 2025-05-12 PROCEDURE — 85610 PROTHROMBIN TIME: CPT

## 2025-05-12 PROCEDURE — 83605 ASSAY OF LACTIC ACID: CPT

## 2025-05-12 PROCEDURE — 84484 ASSAY OF TROPONIN QUANT: CPT

## 2025-05-12 PROCEDURE — 96375 TX/PRO/DX INJ NEW DRUG ADDON: CPT

## 2025-05-12 PROCEDURE — 2500000003 HC RX 250 WO HCPCS: Performed by: INTERNAL MEDICINE

## 2025-05-12 PROCEDURE — 2580000003 HC RX 258: Performed by: EMERGENCY MEDICINE

## 2025-05-12 PROCEDURE — 93005 ELECTROCARDIOGRAM TRACING: CPT | Performed by: INTERNAL MEDICINE

## 2025-05-12 PROCEDURE — 1200000000 HC SEMI PRIVATE

## 2025-05-12 PROCEDURE — 86900 BLOOD TYPING SEROLOGIC ABO: CPT

## 2025-05-12 PROCEDURE — 6360000002 HC RX W HCPCS: Performed by: EMERGENCY MEDICINE

## 2025-05-12 PROCEDURE — 2580000003 HC RX 258: Performed by: INTERNAL MEDICINE

## 2025-05-12 PROCEDURE — 81003 URINALYSIS AUTO W/O SCOPE: CPT

## 2025-05-12 PROCEDURE — 83880 ASSAY OF NATRIURETIC PEPTIDE: CPT

## 2025-05-12 PROCEDURE — 96374 THER/PROPH/DIAG INJ IV PUSH: CPT

## 2025-05-12 RX ORDER — MIDAZOLAM HYDROCHLORIDE 1 MG/ML
2 INJECTION, SOLUTION INTRAMUSCULAR; INTRAVENOUS ONCE
Status: COMPLETED | OUTPATIENT
Start: 2025-05-12 | End: 2025-05-12

## 2025-05-12 RX ORDER — CETIRIZINE HYDROCHLORIDE 10 MG/1
5 TABLET ORAL DAILY
Status: DISCONTINUED | OUTPATIENT
Start: 2025-05-13 | End: 2025-05-14 | Stop reason: HOSPADM

## 2025-05-12 RX ORDER — ACETAMINOPHEN 325 MG/1
650 TABLET ORAL EVERY 6 HOURS PRN
Status: DISCONTINUED | OUTPATIENT
Start: 2025-05-12 | End: 2025-05-14 | Stop reason: HOSPADM

## 2025-05-12 RX ORDER — ENOXAPARIN SODIUM 100 MG/ML
40 INJECTION SUBCUTANEOUS NIGHTLY
Status: DISCONTINUED | OUTPATIENT
Start: 2025-05-13 | End: 2025-05-13

## 2025-05-12 RX ORDER — FENTANYL CITRATE 50 UG/ML
50 INJECTION, SOLUTION INTRAMUSCULAR; INTRAVENOUS ONCE
Status: COMPLETED | OUTPATIENT
Start: 2025-05-12 | End: 2025-05-12

## 2025-05-12 RX ORDER — POTASSIUM CHLORIDE 1500 MG/1
40 TABLET, EXTENDED RELEASE ORAL PRN
Status: DISCONTINUED | OUTPATIENT
Start: 2025-05-12 | End: 2025-05-14 | Stop reason: HOSPADM

## 2025-05-12 RX ORDER — AMLODIPINE BESYLATE 5 MG/1
10 TABLET ORAL DAILY
Status: DISCONTINUED | OUTPATIENT
Start: 2025-05-12 | End: 2025-05-12

## 2025-05-12 RX ORDER — MORPHINE SULFATE 2 MG/ML
2 INJECTION, SOLUTION INTRAMUSCULAR; INTRAVENOUS
Refills: 0 | Status: DISCONTINUED | OUTPATIENT
Start: 2025-05-12 | End: 2025-05-14

## 2025-05-12 RX ORDER — POTASSIUM CHLORIDE 7.45 MG/ML
10 INJECTION INTRAVENOUS PRN
Status: DISCONTINUED | OUTPATIENT
Start: 2025-05-12 | End: 2025-05-14 | Stop reason: HOSPADM

## 2025-05-12 RX ORDER — POLYETHYLENE GLYCOL 3350 17 G/17G
17 POWDER, FOR SOLUTION ORAL DAILY PRN
Status: DISCONTINUED | OUTPATIENT
Start: 2025-05-12 | End: 2025-05-14 | Stop reason: HOSPADM

## 2025-05-12 RX ORDER — SODIUM CHLORIDE 0.9 % (FLUSH) 0.9 %
5-40 SYRINGE (ML) INJECTION EVERY 12 HOURS SCHEDULED
Status: DISCONTINUED | OUTPATIENT
Start: 2025-05-12 | End: 2025-05-14 | Stop reason: HOSPADM

## 2025-05-12 RX ORDER — SODIUM CHLORIDE 9 MG/ML
1000 INJECTION, SOLUTION INTRAVENOUS CONTINUOUS
Status: DISCONTINUED | OUTPATIENT
Start: 2025-05-12 | End: 2025-05-13

## 2025-05-12 RX ORDER — MORPHINE SULFATE 2 MG/ML
2 INJECTION, SOLUTION INTRAMUSCULAR; INTRAVENOUS EVERY 4 HOURS PRN
Status: DISCONTINUED | OUTPATIENT
Start: 2025-05-12 | End: 2025-05-12

## 2025-05-12 RX ORDER — MORPHINE SULFATE 4 MG/ML
4 INJECTION, SOLUTION INTRAMUSCULAR; INTRAVENOUS
Refills: 0 | Status: DISCONTINUED | OUTPATIENT
Start: 2025-05-12 | End: 2025-05-14

## 2025-05-12 RX ORDER — MONTELUKAST SODIUM 10 MG/1
10 TABLET ORAL NIGHTLY
Status: DISCONTINUED | OUTPATIENT
Start: 2025-05-12 | End: 2025-05-14 | Stop reason: HOSPADM

## 2025-05-12 RX ORDER — MAGNESIUM SULFATE IN WATER 40 MG/ML
2000 INJECTION, SOLUTION INTRAVENOUS PRN
Status: DISCONTINUED | OUTPATIENT
Start: 2025-05-12 | End: 2025-05-14 | Stop reason: HOSPADM

## 2025-05-12 RX ORDER — ONDANSETRON 4 MG/1
4 TABLET, ORALLY DISINTEGRATING ORAL EVERY 8 HOURS PRN
Status: DISCONTINUED | OUTPATIENT
Start: 2025-05-12 | End: 2025-05-13 | Stop reason: SDUPTHER

## 2025-05-12 RX ORDER — ONDANSETRON 2 MG/ML
4 INJECTION INTRAMUSCULAR; INTRAVENOUS EVERY 6 HOURS PRN
Status: DISCONTINUED | OUTPATIENT
Start: 2025-05-12 | End: 2025-05-13 | Stop reason: SDUPTHER

## 2025-05-12 RX ORDER — MECOBALAMIN 5000 MCG
5 TABLET,DISINTEGRATING ORAL NIGHTLY
Status: DISCONTINUED | OUTPATIENT
Start: 2025-05-12 | End: 2025-05-14 | Stop reason: HOSPADM

## 2025-05-12 RX ORDER — AMLODIPINE BESYLATE 5 MG/1
10 TABLET ORAL DAILY
Status: DISCONTINUED | OUTPATIENT
Start: 2025-05-13 | End: 2025-05-13

## 2025-05-12 RX ORDER — ACETAMINOPHEN 650 MG/1
650 SUPPOSITORY RECTAL EVERY 6 HOURS PRN
Status: DISCONTINUED | OUTPATIENT
Start: 2025-05-12 | End: 2025-05-14 | Stop reason: HOSPADM

## 2025-05-12 RX ORDER — SODIUM CHLORIDE 0.9 % (FLUSH) 0.9 %
5-40 SYRINGE (ML) INJECTION PRN
Status: DISCONTINUED | OUTPATIENT
Start: 2025-05-12 | End: 2025-05-14 | Stop reason: HOSPADM

## 2025-05-12 RX ORDER — FENTANYL CITRATE 50 UG/ML
25 INJECTION, SOLUTION INTRAMUSCULAR; INTRAVENOUS ONCE
Refills: 0 | Status: COMPLETED | OUTPATIENT
Start: 2025-05-12 | End: 2025-05-12

## 2025-05-12 RX ORDER — SODIUM CHLORIDE 9 MG/ML
1000 INJECTION, SOLUTION INTRAVENOUS CONTINUOUS
Status: DISCONTINUED | OUTPATIENT
Start: 2025-05-12 | End: 2025-05-12

## 2025-05-12 RX ORDER — ENOXAPARIN SODIUM 100 MG/ML
40 INJECTION SUBCUTANEOUS DAILY
Status: DISCONTINUED | OUTPATIENT
Start: 2025-05-12 | End: 2025-05-12

## 2025-05-12 RX ORDER — SODIUM CHLORIDE 9 MG/ML
INJECTION, SOLUTION INTRAVENOUS CONTINUOUS
Status: DISCONTINUED | OUTPATIENT
Start: 2025-05-12 | End: 2025-05-12

## 2025-05-12 RX ORDER — SODIUM CHLORIDE 9 MG/ML
INJECTION, SOLUTION INTRAVENOUS PRN
Status: DISCONTINUED | OUTPATIENT
Start: 2025-05-12 | End: 2025-05-14 | Stop reason: HOSPADM

## 2025-05-12 RX ORDER — SODIUM BICARBONATE 650 MG/1
1300 TABLET ORAL ONCE
Status: COMPLETED | OUTPATIENT
Start: 2025-05-12 | End: 2025-05-12

## 2025-05-12 RX ADMIN — FENTANYL CITRATE 50 MCG: 50 INJECTION, SOLUTION INTRAMUSCULAR; INTRAVENOUS at 17:02

## 2025-05-12 RX ADMIN — SODIUM CHLORIDE 1000 ML: 0.9 INJECTION, SOLUTION INTRAVENOUS at 21:14

## 2025-05-12 RX ADMIN — FENTANYL CITRATE 25 MCG: 50 INJECTION, SOLUTION INTRAMUSCULAR; INTRAVENOUS at 18:40

## 2025-05-12 RX ADMIN — Medication 5 MG: at 23:42

## 2025-05-12 RX ADMIN — Medication 10 ML: at 21:10

## 2025-05-12 RX ADMIN — MONTELUKAST SODIUM 10 MG: 10 TABLET, COATED ORAL at 21:26

## 2025-05-12 RX ADMIN — SODIUM CHLORIDE 1000 ML: 0.9 INJECTION, SOLUTION INTRAVENOUS at 18:40

## 2025-05-12 RX ADMIN — SODIUM BICARBONATE 1300 MG: 650 TABLET ORAL at 21:26

## 2025-05-12 RX ADMIN — SODIUM CHLORIDE: 0.9 INJECTION, SOLUTION INTRAVENOUS at 17:03

## 2025-05-12 RX ADMIN — MIDAZOLAM HYDROCHLORIDE 2 MG: 1 INJECTION, SOLUTION INTRAMUSCULAR; INTRAVENOUS at 17:11

## 2025-05-12 ASSESSMENT — PAIN DESCRIPTION - PAIN TYPE: TYPE: ACUTE PAIN

## 2025-05-12 ASSESSMENT — PAIN DESCRIPTION - ORIENTATION: ORIENTATION: LEFT

## 2025-05-12 ASSESSMENT — PAIN SCALES - GENERAL
PAINLEVEL_OUTOF10: 3
PAINLEVEL_OUTOF10: 7
PAINLEVEL_OUTOF10: 4
PAINLEVEL_OUTOF10: 3

## 2025-05-12 ASSESSMENT — PAIN DESCRIPTION - ONSET: ONSET: ON-GOING

## 2025-05-12 ASSESSMENT — PAIN - FUNCTIONAL ASSESSMENT: PAIN_FUNCTIONAL_ASSESSMENT: PREVENTS OR INTERFERES SOME ACTIVE ACTIVITIES AND ADLS

## 2025-05-12 ASSESSMENT — PAIN DESCRIPTION - DESCRIPTORS: DESCRIPTORS: THROBBING

## 2025-05-12 ASSESSMENT — PAIN DESCRIPTION - FREQUENCY: FREQUENCY: INTERMITTENT

## 2025-05-12 ASSESSMENT — PAIN DESCRIPTION - LOCATION
LOCATION: ANKLE
LOCATION: LEG

## 2025-05-12 NOTE — ED NOTES
Perfect Serve sent to Dr. Ramirez for Orthopedic consult     1808 -Dr. Ramirez spoke with Dr. Cooper at this time

## 2025-05-12 NOTE — PLAN OF CARE
Left tibia and fibula fracture       Diet NPO  Ortho consult      CARIE BUCKNER MD 5/12/2025 7:00 PM
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
## 2025-05-13 ENCOUNTER — ANESTHESIA (OUTPATIENT)
Dept: OPERATING ROOM | Age: 70
DRG: 493 | End: 2025-05-13
Payer: MEDICARE

## 2025-05-13 ENCOUNTER — ANESTHESIA EVENT (OUTPATIENT)
Dept: OPERATING ROOM | Age: 70
DRG: 493 | End: 2025-05-13
Payer: MEDICARE

## 2025-05-13 ENCOUNTER — APPOINTMENT (OUTPATIENT)
Dept: GENERAL RADIOLOGY | Age: 70
DRG: 493 | End: 2025-05-13
Payer: MEDICARE

## 2025-05-13 PROBLEM — M80.00XA AGE-RELATED OSTEOPOROSIS WITH CURRENT PATHOLOGICAL FRACTURE: Status: ACTIVE | Noted: 2025-05-13

## 2025-05-13 LAB
ANION GAP SERPL CALCULATED.3IONS-SCNC: 11 MMOL/L (ref 3–16)
BASOPHILS # BLD: 0 K/UL (ref 0–0.2)
BASOPHILS NFR BLD: 0.3 %
BUN SERPL-MCNC: 9 MG/DL (ref 7–20)
CALCIUM SERPL-MCNC: 8.1 MG/DL (ref 8.3–10.6)
CHLORIDE SERPL-SCNC: 109 MMOL/L (ref 99–110)
CO2 SERPL-SCNC: 20 MMOL/L (ref 21–32)
CREAT SERPL-MCNC: 0.6 MG/DL (ref 0.6–1.2)
DEPRECATED RDW RBC AUTO: 13.7 % (ref 12.4–15.4)
EKG ATRIAL RATE: 75 BPM
EKG DIAGNOSIS: NORMAL
EKG P AXIS: 54 DEGREES
EKG P-R INTERVAL: 166 MS
EKG Q-T INTERVAL: 432 MS
EKG QRS DURATION: 84 MS
EKG QTC CALCULATION (BAZETT): 482 MS
EKG R AXIS: 69 DEGREES
EKG T AXIS: 68 DEGREES
EKG VENTRICULAR RATE: 75 BPM
EOSINOPHIL # BLD: 0 K/UL (ref 0–0.6)
EOSINOPHIL NFR BLD: 0.2 %
FERRITIN SERPL IA-MCNC: 651 NG/ML (ref 15–150)
GFR SERPLBLD CREATININE-BSD FMLA CKD-EPI: >90 ML/MIN/{1.73_M2}
GLUCOSE BLD-MCNC: 99 MG/DL (ref 70–99)
GLUCOSE SERPL-MCNC: 120 MG/DL (ref 70–99)
HAV IGM SERPL QL IA: NORMAL
HBV CORE IGM SERPL QL IA: NORMAL
HBV SURFACE AG SERPL QL IA: NORMAL
HCT VFR BLD AUTO: 38.2 % (ref 36–48)
HCV AB SERPL QL IA: NORMAL
HGB BLD-MCNC: 12.8 G/DL (ref 12–16)
LYMPHOCYTES # BLD: 1.6 K/UL (ref 1–5.1)
LYMPHOCYTES NFR BLD: 20.4 %
MCH RBC QN AUTO: 29.9 PG (ref 26–34)
MCHC RBC AUTO-ENTMCNC: 33.4 G/DL (ref 31–36)
MCV RBC AUTO: 89.3 FL (ref 80–100)
MONOCYTES # BLD: 0.6 K/UL (ref 0–1.3)
MONOCYTES NFR BLD: 7.1 %
NEUTROPHILS # BLD: 5.6 K/UL (ref 1.7–7.7)
NEUTROPHILS NFR BLD: 72 %
PERFORMED ON: NORMAL
PLATELET # BLD AUTO: 286 K/UL (ref 135–450)
PMV BLD AUTO: 7.1 FL (ref 5–10.5)
POTASSIUM SERPL-SCNC: 4 MMOL/L (ref 3.5–5.1)
RBC # BLD AUTO: 4.28 M/UL (ref 4–5.2)
SODIUM SERPL-SCNC: 140 MMOL/L (ref 136–145)
TROPONIN, HIGH SENSITIVITY: 9 NG/L (ref 0–14)
WBC # BLD AUTO: 7.8 K/UL (ref 4–11)

## 2025-05-13 PROCEDURE — 3700000001 HC ADD 15 MINUTES (ANESTHESIA): Performed by: ORTHOPAEDIC SURGERY

## 2025-05-13 PROCEDURE — 6360000002 HC RX W HCPCS: Performed by: INTERNAL MEDICINE

## 2025-05-13 PROCEDURE — C1769 GUIDE WIRE: HCPCS | Performed by: ORTHOPAEDIC SURGERY

## 2025-05-13 PROCEDURE — 6360000002 HC RX W HCPCS: Performed by: ORTHOPAEDIC SURGERY

## 2025-05-13 PROCEDURE — 99232 SBSQ HOSP IP/OBS MODERATE 35: CPT | Performed by: INTERNAL MEDICINE

## 2025-05-13 PROCEDURE — 0QSK04Z REPOSITION LEFT FIBULA WITH INTERNAL FIXATION DEVICE, OPEN APPROACH: ICD-10-PCS | Performed by: ORTHOPAEDIC SURGERY

## 2025-05-13 PROCEDURE — 94761 N-INVAS EAR/PLS OXIMETRY MLT: CPT

## 2025-05-13 PROCEDURE — 80048 BASIC METABOLIC PNL TOTAL CA: CPT

## 2025-05-13 PROCEDURE — 0QHH06Z INSERTION OF INTRAMEDULLARY INTERNAL FIXATION DEVICE INTO LEFT TIBIA, OPEN APPROACH: ICD-10-PCS | Performed by: ORTHOPAEDIC SURGERY

## 2025-05-13 PROCEDURE — 2500000003 HC RX 250 WO HCPCS: Performed by: ORTHOPAEDIC SURGERY

## 2025-05-13 PROCEDURE — 7100000000 HC PACU RECOVERY - FIRST 15 MIN: Performed by: ORTHOPAEDIC SURGERY

## 2025-05-13 PROCEDURE — C1713 ANCHOR/SCREW BN/BN,TIS/BN: HCPCS | Performed by: ORTHOPAEDIC SURGERY

## 2025-05-13 PROCEDURE — 85025 COMPLETE CBC W/AUTO DIFF WBC: CPT

## 2025-05-13 PROCEDURE — 2500000003 HC RX 250 WO HCPCS

## 2025-05-13 PROCEDURE — 6360000002 HC RX W HCPCS

## 2025-05-13 PROCEDURE — 6370000000 HC RX 637 (ALT 250 FOR IP): Performed by: ORTHOPAEDIC SURGERY

## 2025-05-13 PROCEDURE — 3600000014 HC SURGERY LEVEL 4 ADDTL 15MIN: Performed by: ORTHOPAEDIC SURGERY

## 2025-05-13 PROCEDURE — 7100000001 HC PACU RECOVERY - ADDTL 15 MIN: Performed by: ORTHOPAEDIC SURGERY

## 2025-05-13 PROCEDURE — 2580000003 HC RX 258: Performed by: ORTHOPAEDIC SURGERY

## 2025-05-13 PROCEDURE — 82728 ASSAY OF FERRITIN: CPT

## 2025-05-13 PROCEDURE — 2580000003 HC RX 258

## 2025-05-13 PROCEDURE — 3700000000 HC ANESTHESIA ATTENDED CARE: Performed by: ORTHOPAEDIC SURGERY

## 2025-05-13 PROCEDURE — 2720000010 HC SURG SUPPLY STERILE: Performed by: ORTHOPAEDIC SURGERY

## 2025-05-13 PROCEDURE — 2700000000 HC OXYGEN THERAPY PER DAY

## 2025-05-13 PROCEDURE — 3600000004 HC SURGERY LEVEL 4 BASE: Performed by: ORTHOPAEDIC SURGERY

## 2025-05-13 PROCEDURE — 84484 ASSAY OF TROPONIN QUANT: CPT

## 2025-05-13 PROCEDURE — 2709999900 HC NON-CHARGEABLE SUPPLY: Performed by: ORTHOPAEDIC SURGERY

## 2025-05-13 PROCEDURE — 36415 COLL VENOUS BLD VENIPUNCTURE: CPT

## 2025-05-13 PROCEDURE — 94150 VITAL CAPACITY TEST: CPT

## 2025-05-13 PROCEDURE — 2580000003 HC RX 258: Performed by: INTERNAL MEDICINE

## 2025-05-13 PROCEDURE — 80074 ACUTE HEPATITIS PANEL: CPT

## 2025-05-13 PROCEDURE — 1200000000 HC SEMI PRIVATE

## 2025-05-13 PROCEDURE — 93010 ELECTROCARDIOGRAM REPORT: CPT | Performed by: INTERNAL MEDICINE

## 2025-05-13 DEVICE — IMPLANTABLE DEVICE: Type: IMPLANTABLE DEVICE | Site: TIBIA | Status: FUNCTIONAL

## 2025-05-13 DEVICE — WASHER ORTH DIA7MM FOR CANN SCR: Type: IMPLANTABLE DEVICE | Site: TIBIA | Status: FUNCTIONAL

## 2025-05-13 DEVICE — SCREW BNE L30MM DIA4MM S STL CANN SHT 1/3 THRD SM HEX SOCK: Type: IMPLANTABLE DEVICE | Site: TIBIA | Status: FUNCTIONAL

## 2025-05-13 DEVICE — SCREW BNE L14MM DIA2.7MM CORT S STL ST LOK FULL THRD T8: Type: IMPLANTABLE DEVICE | Site: FIBULA | Status: FUNCTIONAL

## 2025-05-13 DEVICE — SCREW BNE L16MM DIA2.7MM CORT S STL ST LOK FULL THRD T8: Type: IMPLANTABLE DEVICE | Site: FIBULA | Status: FUNCTIONAL

## 2025-05-13 DEVICE — SCREW BNE L10MM DIA3.5MM CORT S STL ST NONCANNULATED LOK: Type: IMPLANTABLE DEVICE | Site: FIBULA | Status: FUNCTIONAL

## 2025-05-13 DEVICE — IMPLANTABLE DEVICE: Type: IMPLANTABLE DEVICE | Site: FIBULA | Status: FUNCTIONAL

## 2025-05-13 DEVICE — SCREW BNE L10MM DIA2.7MM CORT S STL ST LOK FULL THRD T8: Type: IMPLANTABLE DEVICE | Site: FIBULA | Status: FUNCTIONAL

## 2025-05-13 DEVICE — SCREW BNE L12MM DIA3.5MM CORT S STL ST NONCANNULATED LOK: Type: IMPLANTABLE DEVICE | Site: FIBULA | Status: FUNCTIONAL

## 2025-05-13 RX ORDER — SODIUM CHLORIDE 9 MG/ML
INJECTION, SOLUTION INTRAVENOUS PRN
Status: DISCONTINUED | OUTPATIENT
Start: 2025-05-13 | End: 2025-05-13

## 2025-05-13 RX ORDER — ROCURONIUM BROMIDE 10 MG/ML
INJECTION, SOLUTION INTRAVENOUS
Status: DISCONTINUED | OUTPATIENT
Start: 2025-05-13 | End: 2025-05-13 | Stop reason: SDUPTHER

## 2025-05-13 RX ORDER — ONDANSETRON 2 MG/ML
4 INJECTION INTRAMUSCULAR; INTRAVENOUS EVERY 6 HOURS PRN
Status: DISCONTINUED | OUTPATIENT
Start: 2025-05-13 | End: 2025-05-14 | Stop reason: HOSPADM

## 2025-05-13 RX ORDER — HYDROMORPHONE HYDROCHLORIDE 2 MG/ML
INJECTION, SOLUTION INTRAMUSCULAR; INTRAVENOUS; SUBCUTANEOUS
Status: DISCONTINUED | OUTPATIENT
Start: 2025-05-13 | End: 2025-05-13 | Stop reason: SDUPTHER

## 2025-05-13 RX ORDER — OXYCODONE HYDROCHLORIDE 5 MG/1
10 TABLET ORAL PRN
Status: DISCONTINUED | OUTPATIENT
Start: 2025-05-13 | End: 2025-05-13

## 2025-05-13 RX ORDER — SODIUM CHLORIDE 0.9 % (FLUSH) 0.9 %
5-40 SYRINGE (ML) INJECTION EVERY 12 HOURS SCHEDULED
Status: DISCONTINUED | OUTPATIENT
Start: 2025-05-13 | End: 2025-05-13

## 2025-05-13 RX ORDER — NALOXONE HYDROCHLORIDE 0.4 MG/ML
INJECTION, SOLUTION INTRAMUSCULAR; INTRAVENOUS; SUBCUTANEOUS PRN
Status: DISCONTINUED | OUTPATIENT
Start: 2025-05-13 | End: 2025-05-13

## 2025-05-13 RX ORDER — SODIUM CHLORIDE 9 MG/ML
INJECTION, SOLUTION INTRAVENOUS PRN
Status: DISCONTINUED | OUTPATIENT
Start: 2025-05-13 | End: 2025-05-14 | Stop reason: HOSPADM

## 2025-05-13 RX ORDER — FENTANYL CITRATE 50 UG/ML
INJECTION, SOLUTION INTRAMUSCULAR; INTRAVENOUS
Status: DISCONTINUED | OUTPATIENT
Start: 2025-05-13 | End: 2025-05-13 | Stop reason: SDUPTHER

## 2025-05-13 RX ORDER — DIPHENHYDRAMINE HYDROCHLORIDE 50 MG/ML
12.5 INJECTION, SOLUTION INTRAMUSCULAR; INTRAVENOUS
Status: DISCONTINUED | OUTPATIENT
Start: 2025-05-13 | End: 2025-05-13

## 2025-05-13 RX ORDER — ONDANSETRON 2 MG/ML
4 INJECTION INTRAMUSCULAR; INTRAVENOUS
Status: DISCONTINUED | OUTPATIENT
Start: 2025-05-13 | End: 2025-05-13

## 2025-05-13 RX ORDER — ENOXAPARIN SODIUM 100 MG/ML
40 INJECTION SUBCUTANEOUS NIGHTLY
Status: DISCONTINUED | OUTPATIENT
Start: 2025-05-14 | End: 2025-05-14 | Stop reason: HOSPADM

## 2025-05-13 RX ORDER — AMLODIPINE BESYLATE 5 MG/1
5 TABLET ORAL NIGHTLY
Status: DISCONTINUED | OUTPATIENT
Start: 2025-05-13 | End: 2025-05-14 | Stop reason: HOSPADM

## 2025-05-13 RX ORDER — SODIUM CHLORIDE 0.9 % (FLUSH) 0.9 %
5-40 SYRINGE (ML) INJECTION PRN
Status: DISCONTINUED | OUTPATIENT
Start: 2025-05-13 | End: 2025-05-13

## 2025-05-13 RX ORDER — PROPOFOL 10 MG/ML
INJECTION, EMULSION INTRAVENOUS
Status: DISCONTINUED | OUTPATIENT
Start: 2025-05-13 | End: 2025-05-13 | Stop reason: SDUPTHER

## 2025-05-13 RX ORDER — ONDANSETRON 4 MG/1
4 TABLET, ORALLY DISINTEGRATING ORAL EVERY 8 HOURS PRN
Status: DISCONTINUED | OUTPATIENT
Start: 2025-05-13 | End: 2025-05-14 | Stop reason: HOSPADM

## 2025-05-13 RX ORDER — LIDOCAINE HYDROCHLORIDE 20 MG/ML
INJECTION, SOLUTION INFILTRATION; PERINEURAL
Status: DISCONTINUED | OUTPATIENT
Start: 2025-05-13 | End: 2025-05-13 | Stop reason: SDUPTHER

## 2025-05-13 RX ORDER — SODIUM CHLORIDE 0.9 % (FLUSH) 0.9 %
5-40 SYRINGE (ML) INJECTION EVERY 12 HOURS SCHEDULED
Status: DISCONTINUED | OUTPATIENT
Start: 2025-05-13 | End: 2025-05-14

## 2025-05-13 RX ORDER — OXYCODONE HYDROCHLORIDE 5 MG/1
5 TABLET ORAL PRN
Status: DISCONTINUED | OUTPATIENT
Start: 2025-05-13 | End: 2025-05-13

## 2025-05-13 RX ORDER — ONDANSETRON 2 MG/ML
INJECTION INTRAMUSCULAR; INTRAVENOUS
Status: DISCONTINUED | OUTPATIENT
Start: 2025-05-13 | End: 2025-05-13 | Stop reason: SDUPTHER

## 2025-05-13 RX ORDER — SODIUM CHLORIDE, SODIUM LACTATE, POTASSIUM CHLORIDE, CALCIUM CHLORIDE 600; 310; 30; 20 MG/100ML; MG/100ML; MG/100ML; MG/100ML
INJECTION, SOLUTION INTRAVENOUS
Status: DISCONTINUED | OUTPATIENT
Start: 2025-05-13 | End: 2025-05-13 | Stop reason: SDUPTHER

## 2025-05-13 RX ORDER — MAGNESIUM HYDROXIDE 1200 MG/15ML
LIQUID ORAL CONTINUOUS PRN
Status: COMPLETED | OUTPATIENT
Start: 2025-05-13 | End: 2025-05-13

## 2025-05-13 RX ORDER — SODIUM CHLORIDE 9 MG/ML
INJECTION, SOLUTION INTRAVENOUS CONTINUOUS
Status: DISCONTINUED | OUTPATIENT
Start: 2025-05-13 | End: 2025-05-14

## 2025-05-13 RX ORDER — MEPERIDINE HYDROCHLORIDE 25 MG/ML
12.5 INJECTION INTRAMUSCULAR; INTRAVENOUS; SUBCUTANEOUS EVERY 5 MIN PRN
Status: DISCONTINUED | OUTPATIENT
Start: 2025-05-13 | End: 2025-05-13

## 2025-05-13 RX ORDER — SODIUM CHLORIDE 0.9 % (FLUSH) 0.9 %
5-40 SYRINGE (ML) INJECTION PRN
Status: DISCONTINUED | OUTPATIENT
Start: 2025-05-13 | End: 2025-05-14 | Stop reason: HOSPADM

## 2025-05-13 RX ORDER — KETOROLAC TROMETHAMINE 30 MG/ML
INJECTION, SOLUTION INTRAMUSCULAR; INTRAVENOUS
Status: DISCONTINUED | OUTPATIENT
Start: 2025-05-13 | End: 2025-05-13 | Stop reason: SDUPTHER

## 2025-05-13 RX ORDER — DEXAMETHASONE SODIUM PHOSPHATE 4 MG/ML
INJECTION, SOLUTION INTRA-ARTICULAR; INTRALESIONAL; INTRAMUSCULAR; INTRAVENOUS; SOFT TISSUE
Status: DISCONTINUED | OUTPATIENT
Start: 2025-05-13 | End: 2025-05-13 | Stop reason: SDUPTHER

## 2025-05-13 RX ORDER — BUPIVACAINE HYDROCHLORIDE 5 MG/ML
INJECTION, SOLUTION PERINEURAL PRN
Status: DISCONTINUED | OUTPATIENT
Start: 2025-05-13 | End: 2025-05-13 | Stop reason: ALTCHOICE

## 2025-05-13 RX ORDER — LABETALOL HYDROCHLORIDE 5 MG/ML
10 INJECTION, SOLUTION INTRAVENOUS
Status: DISCONTINUED | OUTPATIENT
Start: 2025-05-13 | End: 2025-05-13

## 2025-05-13 RX ADMIN — MONTELUKAST SODIUM 10 MG: 10 TABLET, COATED ORAL at 21:04

## 2025-05-13 RX ADMIN — ONDANSETRON 4 MG: 2 INJECTION, SOLUTION INTRAMUSCULAR; INTRAVENOUS at 19:23

## 2025-05-13 RX ADMIN — MORPHINE SULFATE 4 MG: 4 INJECTION, SOLUTION INTRAMUSCULAR; INTRAVENOUS at 02:26

## 2025-05-13 RX ADMIN — ONDANSETRON 4 MG: 2 INJECTION INTRAMUSCULAR; INTRAVENOUS at 16:52

## 2025-05-13 RX ADMIN — SODIUM CHLORIDE 2000 MG: 9 INJECTION, SOLUTION INTRAVENOUS at 14:40

## 2025-05-13 RX ADMIN — CEFAZOLIN 2000 MG: 2 INJECTION, POWDER, FOR SOLUTION INTRAVENOUS at 21:00

## 2025-05-13 RX ADMIN — PROPOFOL 100 MG: 10 INJECTION, EMULSION INTRAVENOUS at 14:40

## 2025-05-13 RX ADMIN — DEXAMETHASONE SODIUM PHOSPHATE 8 MG: 4 INJECTION, SOLUTION INTRAMUSCULAR; INTRAVENOUS at 15:04

## 2025-05-13 RX ADMIN — FENTANYL CITRATE 50 MCG: 50 INJECTION INTRAMUSCULAR; INTRAVENOUS at 14:40

## 2025-05-13 RX ADMIN — ROCURONIUM BROMIDE 20 MG: 10 INJECTION, SOLUTION INTRAVENOUS at 15:46

## 2025-05-13 RX ADMIN — SODIUM CHLORIDE: 0.9 INJECTION, SOLUTION INTRAVENOUS at 09:49

## 2025-05-13 RX ADMIN — AMLODIPINE BESYLATE 5 MG: 5 TABLET ORAL at 21:04

## 2025-05-13 RX ADMIN — SUGAMMADEX 200 MG: 100 INJECTION, SOLUTION INTRAVENOUS at 16:54

## 2025-05-13 RX ADMIN — FENTANYL CITRATE 50 MCG: 50 INJECTION INTRAMUSCULAR; INTRAVENOUS at 15:13

## 2025-05-13 RX ADMIN — Medication 5 MG: at 21:04

## 2025-05-13 RX ADMIN — SODIUM CHLORIDE, POTASSIUM CHLORIDE, SODIUM LACTATE AND CALCIUM CHLORIDE: 600; 310; 30; 20 INJECTION, SOLUTION INTRAVENOUS at 16:49

## 2025-05-13 RX ADMIN — LIDOCAINE HYDROCHLORIDE 60 MG: 20 INJECTION, SOLUTION INFILTRATION; PERINEURAL at 14:40

## 2025-05-13 RX ADMIN — SODIUM CHLORIDE, POTASSIUM CHLORIDE, SODIUM LACTATE AND CALCIUM CHLORIDE: 600; 310; 30; 20 INJECTION, SOLUTION INTRAVENOUS at 14:59

## 2025-05-13 RX ADMIN — HYDROMORPHONE HYDROCHLORIDE 0.2 MG: 2 INJECTION, SOLUTION INTRAMUSCULAR; INTRAVENOUS; SUBCUTANEOUS at 16:35

## 2025-05-13 RX ADMIN — HYDROMORPHONE HYDROCHLORIDE 0.5 MG: 2 INJECTION, SOLUTION INTRAMUSCULAR; INTRAVENOUS; SUBCUTANEOUS at 15:52

## 2025-05-13 RX ADMIN — MORPHINE SULFATE 4 MG: 4 INJECTION, SOLUTION INTRAMUSCULAR; INTRAVENOUS at 09:46

## 2025-05-13 RX ADMIN — KETOROLAC TROMETHAMINE 30 MG: 30 INJECTION, SOLUTION INTRAMUSCULAR at 16:58

## 2025-05-13 RX ADMIN — ROCURONIUM BROMIDE 50 MG: 10 INJECTION, SOLUTION INTRAVENOUS at 14:42

## 2025-05-13 RX ADMIN — HYDROMORPHONE HYDROCHLORIDE 0.5 MG: 2 INJECTION, SOLUTION INTRAMUSCULAR; INTRAVENOUS; SUBCUTANEOUS at 16:07

## 2025-05-13 ASSESSMENT — PAIN - FUNCTIONAL ASSESSMENT
PAIN_FUNCTIONAL_ASSESSMENT: WONG-BAKER FACES
PAIN_FUNCTIONAL_ASSESSMENT: PREVENTS OR INTERFERES SOME ACTIVE ACTIVITIES AND ADLS
PAIN_FUNCTIONAL_ASSESSMENT: NONE - DENIES PAIN
PAIN_FUNCTIONAL_ASSESSMENT: PREVENTS OR INTERFERES WITH MANY ACTIVE NOT PASSIVE ACTIVITIES

## 2025-05-13 ASSESSMENT — PAIN DESCRIPTION - PAIN TYPE
TYPE: ACUTE PAIN
TYPE: ACUTE PAIN

## 2025-05-13 ASSESSMENT — PAIN SCALES - GENERAL
PAINLEVEL_OUTOF10: 7
PAINLEVEL_OUTOF10: 0
PAINLEVEL_OUTOF10: 4
PAINLEVEL_OUTOF10: 8
PAINLEVEL_OUTOF10: 0

## 2025-05-13 ASSESSMENT — PAIN DESCRIPTION - ONSET
ONSET: ON-GOING
ONSET: ON-GOING

## 2025-05-13 ASSESSMENT — PAIN DESCRIPTION - DESCRIPTORS
DESCRIPTORS: THROBBING
DESCRIPTORS: THROBBING

## 2025-05-13 ASSESSMENT — PAIN DESCRIPTION - LOCATION
LOCATION: LEG
LOCATION: LEG

## 2025-05-13 ASSESSMENT — PAIN DESCRIPTION - ORIENTATION
ORIENTATION: LEFT
ORIENTATION: LEFT

## 2025-05-13 ASSESSMENT — PAIN DESCRIPTION - FREQUENCY
FREQUENCY: CONTINUOUS
FREQUENCY: CONTINUOUS

## 2025-05-13 ASSESSMENT — PAIN SCALES - WONG BAKER
WONGBAKER_NUMERICALRESPONSE: NO HURT
WONGBAKER_NUMERICALRESPONSE: NO HURT

## 2025-05-13 ASSESSMENT — PAIN DESCRIPTION - DIRECTION: RADIATING_TOWARDS: DENIES

## 2025-05-13 NOTE — BRIEF OP NOTE
Brief Postoperative Note      Patient: Carol Dasilva  YOB: 1955  MRN: [id number]    Date of Procedure: 5/13/2025    Pre-Op Diagnosis Codes:      * Closed displaced comminuted fracture of shaft of left tibia, initial encounter [S82.252A]    Post-Op Diagnosis: Same       Procedure(s):  LEFT TIBIA INTRAMEDULLARY NAILING, OPEN REDUCTION INTERNAL FIXATION DISTAL FIBULA    Surgeon(s):  Tomás Olmstead MD    Assistant:  Surgical Assistant: Juan A Fleming; Krysten Araujo    Anesthesia: General    Estimated Blood Loss (mL): less than 100     Complications: None    Specimens:   * No specimens in log *    Implants:  Implant Name Type Inv. Item Serial No.  Lot No. LRB No. Used Action   NAIL TIBIAL ADV [id number] ST - [id number]  NAIL TIBIAL ADV [id number] ST  DEPUY [employer]-WD [id number] Left 1 Implanted   SCREW BNE L30MM DIA4MM S STL ARON SHT 1/3 THRD SM HEX SOCK - [id number]  SCREW BNE L30MM DIA4MM S STL ARON SHT 1/3 THRD SM HEX SOCK  DEPUY [employer]-WD  Left 1 Implanted   WASHER ORTH DIA7MM FOR ARON SCR - [id number]  WASHER ORTH DIA7MM FOR ARON SCR  DEPUY [employer] [place]  Left 1 Implanted   NAIL TIBIAL ADV [id number] ST - [id number]  NAIL TIBIAL ADV [id number] ST  DEPUY [employer] USA-WD [id number] Left 1 Implanted   SCREW BNE LCK 5X38 MM XL25 STRL TUBE RETROGRADE RFNADVANCED - [id number]  SCREW BNE LCK 5X38 MM XL25 STRL TUBE RETROGRADE RFNADVANCED  OpziUY [employer] [place] [id number] Left 1 Implanted   SCREW BNE LCK 5X28 MM XL25 STRL TUBE RETROGRADE RFNADVANCED - [id number]  SCREW BNE LCK 5X28 MM XL25 STRL TUBE RETROGRADE RFNADVANCED  OpziUY SYNTHES [place]- [id number] Left 1 Implanted   SCREW BNE LCK 5X26 MM STRL TUBE FOR IM NAIL TI RFNADVANCED - [id number]  SCREW BNE LCK 5X26 MM STRL TUBE FOR IM NAIL TI RFNADVANCED  DEPUY [employer] [place] [id number] Left 1 Implanted   SCREW BNE LCK 5X38 MM XL25 STRL TUBE RETROGRADE RFNADVANCED - [id number]  SCREW BNE LCK 5X38 MM XL25 STRL TUBE RETROGRADE RFNADVANCED  [employer]

## 2025-05-13 NOTE — FLOWSHEET NOTE
05/12/25 2100   Vital Signs   Temp 97.9 °F (36.6 °C)   Temp Source Oral   Pulse 75   Heart Rate Source Monitor   Respirations 18   /61   MAP (Calculated) 86   BP Location Right upper arm   BP Method Automatic   Patient Position Supine   Pain Assessment   Pain Assessment 0-10   Pain Level 3   Patient's Stated Pain Goal 0 - No pain   Pain Location Leg   Pain Orientation Left   Pain Descriptors Throbbing   Functional Pain Assessment Prevents or interferes some active activities and ADLs   Pain Type Acute pain   Pain Frequency Intermittent   Pain Onset On-going   Non-Pharmaceutical Pain Intervention(s) Rest;Ice;Elevation   Care Plan - Pain Goals   Verbalizes/displays adequate comfort level or baseline comfort level Encourage patient to monitor pain and request assistance;Assess pain using appropriate pain scale;Administer analgesics based on type and severity of pain and evaluate response;Implement non-pharmacological measures as appropriate and evaluate response   Opioid-Induced Sedation   POSS Score 1   RASS   Mckee Agitation Sedation Scale (RASS) 0   Oxygen Therapy   SpO2 97 %   O2 Device None (Room air)   Height and Weight   Height 1.549 m (5' 1\")   Weight - Scale 68.6 kg (151 lb 3.8 oz)   Weight Method Bed scale   BSA (Calculated - sq m) 1.72 sq meters   BMI (Calculated) 28.6     Received from ER awake, alert, oriented, via stretcher. Left leg dressing intact, elevated, with ice pack. Admission assessment complete.   Vital signs are stable.  Instructed to be on NPO from midnight, for surgery tomorrow. Refused pain medicine at present. Call light within reach.

## 2025-05-13 NOTE — ANESTHESIA POSTPROCEDURE EVALUATION
Department of Anesthesiology  Postprocedure Note    Patient: Carol Dasilva  MRN: [id number]  YOB: 1955  Date of evaluation: 5/13/2025    Procedure Summary       Date: 05/13/25 Room / Location: [address]    Anesthesia Start: 1440 Anesthesia Stop: 1728    Procedure: LEFT TIBIA INTRAMEDULLARY NAILING, OPEN REDUCTION INTERNAL FIXATION DISTAL FIBULA (Left: Leg Lower) Diagnosis:       Closed displaced comminuted fracture of shaft of left tibia, initial encounter      (Closed displaced comminuted fracture of shaft of left tibia, initial encounter [S82.252A])    Surgeons: Tomás Olmstead MD Responsible Provider: Daniel Dougherty MD    Anesthesia Type: General ASA Status: 2            Anesthesia Type: General    Mickie Phase I: Mickie Score: 8    Mickie Phase II:      Anesthesia Post Evaluation    Patient location during evaluation: PACU  Patient participation: complete - patient participated  Level of consciousness: awake and alert  Airway patency: patent  Nausea & Vomiting: no nausea and no vomiting  Cardiovascular status: blood pressure returned to baseline  Respiratory status: acceptable  Hydration status: euvolemic  Comments: VSS on transfer to phase 2 recovery.  No anesthetic complications.  Pain management: adequate    No notable events documented.

## 2025-05-13 NOTE — PLAN OF CARE
Problem: Discharge Planning  Goal: Discharge to home or other facility with appropriate resources  Outcome: Progressing     Problem: Pain  Goal: Verbalizes/displays adequate comfort level or baseline comfort level  Outcome: Progressing  Flowsheets (Taken 5/13/2025 0930)  Verbalizes/displays adequate comfort level or baseline comfort level:   Encourage patient to monitor pain and request assistance   Assess pain using appropriate pain scale     Problem: Skin/Tissue Integrity  Goal: Skin integrity remains intact  Description: 1.  Monitor for areas of redness and/or skin breakdown2.  Assess vascular access sites hourly3.  Every 4-6 hours minimum:  Change oxygen saturation probe site4.  Every 4-6 hours:  If on nasal continuous positive airway pressure, respiratory therapy assess nares and determine need for appliance change or resting period  Outcome: Progressing  Flowsheets (Taken 5/13/2025 1525)  Skin Integrity Remains Intact: Monitor for areas of redness and/or skin breakdown     Problem: Safety - Adult  Goal: Free from fall injury  Outcome: Progressing

## 2025-05-13 NOTE — FLOWSHEET NOTE
05/13/25 1824   Vital Signs   Temp 97.7 °F (36.5 °C)   Temp Source Oral   Pulse 72   Heart Rate Source Monitor   Respirations 14   /65   MAP (Calculated) 88   BP Location Right upper arm   BP Method Automatic   Patient Position Semi brauliolers   Pain Assessment   Pain Assessment None - Denies Pain   Pain Level 0   Care Plan - Pain Goals   Verbalizes/displays adequate comfort level or baseline comfort level Encourage patient to monitor pain and request assistance;Assess pain using appropriate pain scale   Opioid-Induced Sedation   POSS Score S   RASS   Mckee Agitation Sedation Scale (RASS) -1   Oxygen Therapy   SpO2 (!) 87 %   O2 Device None (Room air)       Patient very drowsy post op, awakens to voice. O2 sat 87% on RA. 2L O2 NC placed, pt still satting 89%. 3L O2 on patient. IS ordered from RT. Encouraged patient to deep breathe. Family at bed side. Pt c/o nausea, PRN Zofran given, see MAR. Pt denies further needs. Call light within reach. Bed alarm on.

## 2025-05-13 NOTE — PLAN OF CARE
Problem: Discharge Planning  Goal: Discharge to home or other facility with appropriate resources  Outcome: Progressing     Problem: Pain  Goal: Verbalizes/displays adequate comfort level or baseline comfort level  Outcome: Progressing  Flowsheets (Taken 5/12/2025 2100)  Verbalizes/displays adequate comfort level or baseline comfort level:   Encourage patient to monitor pain and request assistance   Assess pain using appropriate pain scale   Administer analgesics based on type and severity of pain and evaluate response   Implement non-pharmacological measures as appropriate and evaluate response     Problem: Skin/Tissue Integrity  Goal: Skin integrity remains intact  Description: 1.  Monitor for areas of redness and/or skin breakdown2.  Assess vascular access sites hourly3.  Every 4-6 hours minimum:  Change oxygen saturation probe site4.  Every 4-6 hours:  If on nasal continuous positive airway pressure, respiratory therapy assess nares and determine need for appliance change or resting period  Outcome: Progressing     Problem: Safety - Adult  Goal: Free from fall injury  Outcome: Progressing

## 2025-05-13 NOTE — ANESTHESIA PRE PROCEDURE
Department of Anesthesiology  Preprocedure Note       Name:  Carol Dasilva   Age:  69 y.o.  :  1955                                          MRN:  [id number]         Date:  2025      Surgeon: Surgeon(s):  Tomás Olmstead MD    Procedure: Procedure(s):  LEFT TIBIA INTRAMEDULLARY NAILING, OPEN REDUCTION INTERNAL FIXATION DISTAL FIBULA    Medications prior to admission:   Prior to Admission medications    Medication Sig Start Date End Date Taking? Authorizing Provider   amLODIPine (NORVASC) 10 MG tablet Take 1 tablet by mouth daily 25  Yes Pham Aburto DO   cetirizine (ZYRTEC) 5 MG tablet Take 1 tablet by mouth daily   Yes Provider, MD Lois   Multiple Minerals-Vitamins (CALCIUM & VIT D3 BONE HEALTH PO) Take by mouth   Yes ProviderLois MD   montelukast (SINGULAIR) 10 MG tablet Take 1 tablet by mouth nightly   Yes ProviderLois MD   promethazine-dextromethorphan (PROMETHAZINE-DM) 6.25-15 MG/5ML syrup Take 5 mLs by mouth nightly as needed for Cough  Patient not taking: Reported on 24   Sheri Pace, APRN - CNP   alendronate (FOSAMAX) 70 MG tablet Take 1 tablet by mouth every 7 days  Patient not taking: Reported on 2025   Pham Aburto DO       Current medications:    Current Facility-Administered Medications   Medication Dose Route Frequency Provider Last Rate Last Admin   • sodium chloride flush 0.9 % injection 5-40 mL  5-40 mL IntraVENous 2 times per day Tomás Olmstead MD       • sodium chloride flush 0.9 % injection 5-40 mL  5-40 mL IntraVENous PRN Tomás Olmstead MD       • 0.9 % sodium chloride infusion   IntraVENous PRN Tomás Olmstead MD       • ceFAZolin (ANCEF) 2,000 mg in sodium chloride 0.9 % 50 mL IVPB (addEASE)  2,000 mg IntraVENous On Call to OR Tomás Olmstead MD       • amLODIPine (NORVASC) tablet 5 mg  5 mg Oral Nightly Tanmay Henry MD       • 0.9 % sodium chloride infusion   IntraVENous Continuous Elaine

## 2025-05-13 NOTE — CARE COORDINATION
4:29 PM  Discharge Planning:     (CM) reviewed the patient's chart to assess needs. Patient's Readmission Risk Score is 6%. Patient's medical insurance is [employer]. Patient's PCP is Pham Aburto DO. CM team to follow. Staff to inform CM if discharge needs arise.    SARAH noted pt lives at home with . ELIJAHK noted pt is having surgery today. ELIJAHK noted that PT/OT will work with pt after surgery. Watch for recs.

## 2025-05-14 VITALS
TEMPERATURE: 98.1 F | HEIGHT: 61 IN | HEART RATE: 88 BPM | WEIGHT: 151.24 LBS | RESPIRATION RATE: 17 BRPM | SYSTOLIC BLOOD PRESSURE: 138 MMHG | OXYGEN SATURATION: 96 % | BODY MASS INDEX: 28.55 KG/M2 | DIASTOLIC BLOOD PRESSURE: 59 MMHG

## 2025-05-14 LAB
ANION GAP SERPL CALCULATED.3IONS-SCNC: 12 MMOL/L (ref 3–16)
BUN SERPL-MCNC: 9 MG/DL (ref 7–20)
CALCIUM SERPL-MCNC: 7 MG/DL (ref 8.3–10.6)
CHLORIDE SERPL-SCNC: 107 MMOL/L (ref 99–110)
CO2 SERPL-SCNC: 19 MMOL/L (ref 21–32)
CREAT SERPL-MCNC: 0.5 MG/DL (ref 0.6–1.2)
DEPRECATED RDW RBC AUTO: 14 % (ref 12.4–15.4)
GFR SERPLBLD CREATININE-BSD FMLA CKD-EPI: >90 ML/MIN/{1.73_M2}
GLUCOSE SERPL-MCNC: 199 MG/DL (ref 70–99)
HCT VFR BLD AUTO: 32.7 % (ref 36–48)
HGB BLD-MCNC: 11.3 G/DL (ref 12–16)
MCH RBC QN AUTO: 30.8 PG (ref 26–34)
MCHC RBC AUTO-ENTMCNC: 34.6 G/DL (ref 31–36)
MCV RBC AUTO: 89 FL (ref 80–100)
PLATELET # BLD AUTO: 258 K/UL (ref 135–450)
PMV BLD AUTO: 7.4 FL (ref 5–10.5)
POTASSIUM SERPL-SCNC: 3.5 MMOL/L (ref 3.5–5.1)
RBC # BLD AUTO: 3.68 M/UL (ref 4–5.2)
SODIUM SERPL-SCNC: 138 MMOL/L (ref 136–145)
WBC # BLD AUTO: 8.5 K/UL (ref 4–11)

## 2025-05-14 PROCEDURE — 94761 N-INVAS EAR/PLS OXIMETRY MLT: CPT

## 2025-05-14 PROCEDURE — 2580000003 HC RX 258: Performed by: ORTHOPAEDIC SURGERY

## 2025-05-14 PROCEDURE — 36415 COLL VENOUS BLD VENIPUNCTURE: CPT

## 2025-05-14 PROCEDURE — 97535 SELF CARE MNGMENT TRAINING: CPT

## 2025-05-14 PROCEDURE — 6370000000 HC RX 637 (ALT 250 FOR IP): Performed by: ORTHOPAEDIC SURGERY

## 2025-05-14 PROCEDURE — 97116 GAIT TRAINING THERAPY: CPT

## 2025-05-14 PROCEDURE — 2700000000 HC OXYGEN THERAPY PER DAY

## 2025-05-14 PROCEDURE — 97110 THERAPEUTIC EXERCISES: CPT

## 2025-05-14 PROCEDURE — 97530 THERAPEUTIC ACTIVITIES: CPT

## 2025-05-14 PROCEDURE — 85027 COMPLETE CBC AUTOMATED: CPT

## 2025-05-14 PROCEDURE — 97165 OT EVAL LOW COMPLEX 30 MIN: CPT

## 2025-05-14 PROCEDURE — 97162 PT EVAL MOD COMPLEX 30 MIN: CPT

## 2025-05-14 PROCEDURE — 80048 BASIC METABOLIC PNL TOTAL CA: CPT

## 2025-05-14 PROCEDURE — 6370000000 HC RX 637 (ALT 250 FOR IP): Performed by: PHYSICIAN ASSISTANT

## 2025-05-14 PROCEDURE — 6360000002 HC RX W HCPCS: Performed by: ORTHOPAEDIC SURGERY

## 2025-05-14 RX ORDER — OXYCODONE AND ACETAMINOPHEN 5; 325 MG/1; MG/1
1 TABLET ORAL EVERY 4 HOURS PRN
Status: DISCONTINUED | OUTPATIENT
Start: 2025-05-14 | End: 2025-05-14 | Stop reason: HOSPADM

## 2025-05-14 RX ORDER — ONDANSETRON 4 MG/1
4 TABLET, ORALLY DISINTEGRATING ORAL EVERY 8 HOURS PRN
Qty: 30 TABLET | Refills: 1 | Status: SHIPPED | OUTPATIENT
Start: 2025-05-14

## 2025-05-14 RX ORDER — OXYCODONE AND ACETAMINOPHEN 5; 325 MG/1; MG/1
1 TABLET ORAL EVERY 4 HOURS PRN
Qty: 15 TABLET | Refills: 0 | Status: SHIPPED | OUTPATIENT
Start: 2025-05-14 | End: 2025-05-17

## 2025-05-14 RX ORDER — ENOXAPARIN SODIUM 100 MG/ML
40 INJECTION SUBCUTANEOUS NIGHTLY
Qty: 12 ML | Refills: 0 | Status: SHIPPED | OUTPATIENT
Start: 2025-05-14 | End: 2025-06-13

## 2025-05-14 RX ORDER — OXYCODONE AND ACETAMINOPHEN 5; 325 MG/1; MG/1
2 TABLET ORAL EVERY 4 HOURS PRN
Status: DISCONTINUED | OUTPATIENT
Start: 2025-05-14 | End: 2025-05-14 | Stop reason: HOSPADM

## 2025-05-14 RX ORDER — ALENDRONATE SODIUM 70 MG/1
70 TABLET ORAL
Qty: 4 TABLET | Refills: 0 | Status: SHIPPED | OUTPATIENT
Start: 2025-05-14

## 2025-05-14 RX ADMIN — ACETAMINOPHEN 650 MG: 325 TABLET ORAL at 04:25

## 2025-05-14 RX ADMIN — CETIRIZINE HYDROCHLORIDE 5 MG: 10 TABLET ORAL at 09:04

## 2025-05-14 RX ADMIN — OXYCODONE HYDROCHLORIDE AND ACETAMINOPHEN 1 TABLET: 5; 325 TABLET ORAL at 09:04

## 2025-05-14 RX ADMIN — POTASSIUM CHLORIDE 40 MEQ: 1500 TABLET, EXTENDED RELEASE ORAL at 09:05

## 2025-05-14 RX ADMIN — ACETAMINOPHEN 650 MG: 325 TABLET ORAL at 13:59

## 2025-05-14 RX ADMIN — CEFAZOLIN 2000 MG: 2 INJECTION, POWDER, FOR SOLUTION INTRAVENOUS at 04:30

## 2025-05-14 RX ADMIN — SODIUM CHLORIDE: 0.9 INJECTION, SOLUTION INTRAVENOUS at 04:31

## 2025-05-14 ASSESSMENT — PAIN DESCRIPTION - PAIN TYPE
TYPE: ACUTE PAIN;SURGICAL PAIN
TYPE: ACUTE PAIN
TYPE: ACUTE PAIN;SURGICAL PAIN

## 2025-05-14 ASSESSMENT — PAIN SCALES - WONG BAKER: WONGBAKER_NUMERICALRESPONSE: NO HURT

## 2025-05-14 ASSESSMENT — PAIN SCALES - GENERAL
PAINLEVEL_OUTOF10: 0
PAINLEVEL_OUTOF10: 0
PAINLEVEL_OUTOF10: 4
PAINLEVEL_OUTOF10: 3
PAINLEVEL_OUTOF10: 0
PAINLEVEL_OUTOF10: 0
PAINLEVEL_OUTOF10: 6
PAINLEVEL_OUTOF10: 0
PAINLEVEL_OUTOF10: 9
PAINLEVEL_OUTOF10: 0
PAINLEVEL_OUTOF10: 5

## 2025-05-14 ASSESSMENT — PAIN DESCRIPTION - DESCRIPTORS
DESCRIPTORS: ACHING;THROBBING
DESCRIPTORS: ACHING
DESCRIPTORS: ACHING;THROBBING

## 2025-05-14 ASSESSMENT — PAIN - FUNCTIONAL ASSESSMENT
PAIN_FUNCTIONAL_ASSESSMENT: PREVENTS OR INTERFERES WITH ALL ACTIVE AND SOME PASSIVE ACTIVITIES
PAIN_FUNCTIONAL_ASSESSMENT: PREVENTS OR INTERFERES WITH MANY ACTIVE NOT PASSIVE ACTIVITIES
PAIN_FUNCTIONAL_ASSESSMENT: ACTIVITIES ARE NOT PREVENTED

## 2025-05-14 ASSESSMENT — PAIN DESCRIPTION - DIRECTION
RADIATING_TOWARDS: DENIES
RADIATING_TOWARDS: DENIES

## 2025-05-14 ASSESSMENT — PAIN DESCRIPTION - ORIENTATION
ORIENTATION: LEFT
ORIENTATION: LEFT
ORIENTATION: ANTERIOR

## 2025-05-14 ASSESSMENT — PAIN DESCRIPTION - ONSET
ONSET: GRADUAL
ONSET: ON-GOING
ONSET: ON-GOING

## 2025-05-14 ASSESSMENT — PAIN DESCRIPTION - FREQUENCY
FREQUENCY: CONTINUOUS
FREQUENCY: CONTINUOUS
FREQUENCY: INTERMITTENT

## 2025-05-14 ASSESSMENT — PAIN DESCRIPTION - LOCATION
LOCATION: HEAD
LOCATION: LEG
LOCATION: LEG
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
## 2025-05-14 NOTE — DISCHARGE SUMMARY
FIBULA LEFT (2 VIEWS)   Final Result   Fairly good reduction of the distal fibular and tibial fractures.         XR TIBIA FIBULA LEFT (2 VIEWS)   Final Result   Oblique fractures of the distal tibia and fibula with anterior and lateral   displacement.         XR ANKLE LEFT (MIN 3 VIEWS)   Final Result   Comminuted oblique fractures of the distal tibia and fibula.         FLUORO FOR SURGICAL PROCEDURES    (Results Pending)          Discharge Medications     Medication List        START taking these medications      enoxaparin 40 MG/0.4ML  Commonly known as: LOVENOX  Inject 0.4 mLs into the skin at bedtime     ondansetron 4 MG disintegrating tablet  Commonly known as: ZOFRAN-ODT  Take 1 tablet by mouth every 8 hours as needed for Nausea or Vomiting     oxyCODONE-acetaminophen 5-325 MG per tablet  Commonly known as: PERCOCET  Take 1 tablet by mouth every 4 hours as needed for Pain for up to 3 days. Max Daily Amount: 6 tablets            CONTINUE taking these medications      alendronate 70 MG tablet  Commonly known as: FOSAMAX  Take 1 tablet by mouth every 7 days     amLODIPine 10 MG tablet  Commonly known as: NORVASC  Take 1 tablet by mouth daily     CALCIUM & VIT D3 BONE HEALTH PO     cetirizine 5 MG tablet  Commonly known as: ZYRTEC     montelukast 10 MG tablet  Commonly known as: SINGULAIR            STOP taking these medications      promethazine-dextromethorphan 6.25-15 MG/5ML syrup  Commonly known as: PROMETHAZINE-DM               Where to Get Your Medications        These medications were sent to [facility]  - [place], [place] - [address] - P [phone] - F [phone]  [address] [address]      Phone: [phone]   alendronate 70 MG tablet  enoxaparin 40 MG/0.4ML  ondansetron 4 MG disintegrating tablet  oxyCODONE-acetaminophen 5-325 MG per tablet           Discharge Condition/Location: Stable to home     Follow Up:  Follow up with

## 2025-05-14 NOTE — OP NOTE
Operative Note      Patient: Carol Dasilva  YOB: 1955  MRN: [id number]    Date of Procedure: 5/13/2025    Pre-Op Diagnosis Codes:      * Closed displaced comminuted fracture of shaft of left tibia, initial encounter [S82.252A]    Post-Op Diagnosis: Same       Procedure(s):  LEFT TIBIA INTRAMEDULLARY NAILING, OPEN REDUCTION INTERNAL FIXATION DISTAL FIBULA    Surgeon(s):  Tomás Olmstead MD    Assistant:   Surgical Assistant: Juan A Fleming; Krysten Araujo    Anesthesia: General    Estimated Blood Loss (mL): less than 100     Complications: None    Specimens:   * No specimens in log *    Implants:  Implant Name Type Inv. Item Serial No.  Lot No. LRB No. Used Action   NAIL TIBIAL ADV [id number] ST - [id number]  NAIL TIBIAL ADV [id number] ST  DEPUY [employer] [id number] Left 1 Implanted   SCREW BNE L30MM DIA4MM S STL ARON SHT 1/3 THRD SM HEX SOCK - [id number]  SCREW BNE L30MM DIA4MM S STL ARON SHT 1/3 THRD SM HEX SOCK  DEPUY [employer]WD  Left 1 Implanted   WASHER ORTH DIA7MM FOR ARON SCR - [id number]  WASHER ORTH DIA7MM FOR ARON SCR  DEPUY [employer]WD  Left 1 Implanted   NAIL TIBIAL ADV [id number] ST - [id number]  NAIL TIBIAL ADV [id number] ST  DEPUY [employer] [id number] Left 1 Implanted   SCREW BNE LCK 5X38 MM XL25 STRL TUBE RETROGRADE RFNADVANCED - [id number]  SCREW BNE LCK 5X38 MM XL25 STRL TUBE RETROGRADE RFNADVANCED  DEPUY [employer] [id number] Left 1 Implanted   SCREW BNE LCK 5X28 MM XL25 STRL TUBE RETROGRADE RFNADVANCED - [id number]  SCREW BNE LCK 5X28 MM XL25 STRL TUBE RETROGRADE RFNADVANCED  DEPUY [employer] [id number] Left 1 Implanted   SCREW BNE LCK 5X26 MM STRL TUBE FOR IM NAIL TI RFNADVANCED - [id number]  SCREW BNE LCK 5X26 MM STRL TUBE FOR IM NAIL TI RFNADVANCED  DEPUY [employer] [id number] Left 1 Implanted   SCREW BNE LCK 5X38 MM XL25 STRL TUBE RETROGRADE RFNADVANCED - [id number]  SCREW BNE LCK 5X38 MM XL25 STRL TUBE RETROGRADE RFNADVANCED  [employer] [facility]-WD

## 2025-05-14 NOTE — PLAN OF CARE
Problem: Discharge Planning  Goal: Discharge to home or other facility with appropriate resources  5/14/2025 1324 by Socorro Monsivais RN  Outcome: Adequate for Discharge  5/14/2025 1323 by Socorro Monsivais RN  Outcome: Progressing  5/14/2025 0335 by Grace Urbina RN  Outcome: Progressing     Problem: Pain  Goal: Verbalizes/displays adequate comfort level or baseline comfort level  5/14/2025 1324 by Socorro Monsivais RN  Outcome: Adequate for Discharge  5/14/2025 1323 by Socorro Monsivais RN  Outcome: Progressing  Flowsheets (Taken 5/14/2025 0900)  Verbalizes/displays adequate comfort level or baseline comfort level:   Encourage patient to monitor pain and request assistance   Assess pain using appropriate pain scale  5/14/2025 0335 by Grace Urbina RN  Outcome: Progressing  Flowsheets (Taken 5/13/2025 1824 by Yodit, Socorro R, RN)  Verbalizes/displays adequate comfort level or baseline comfort level:   Encourage patient to monitor pain and request assistance   Assess pain using appropriate pain scale     Problem: Skin/Tissue Integrity  Goal: Skin integrity remains intact  Description: 1.  Monitor for areas of redness and/or skin breakdown2.  Assess vascular access sites hourly3.  Every 4-6 hours minimum:  Change oxygen saturation probe site4.  Every 4-6 hours:  If on nasal continuous positive airway pressure, respiratory therapy assess nares and determine need for appliance change or resting period  5/14/2025 1324 by Socorro Monsivais RN  Outcome: Adequate for Discharge  5/14/2025 1323 by Socorro Monsivais RN  Outcome: Progressing  Flowsheets (Taken 5/14/2025 1323)  Skin Integrity Remains Intact: Monitor for areas of redness and/or skin breakdown  5/14/2025 0335 by Grace Urbina RN  Outcome: Progressing     Problem: Safety - Adult  Goal: Free from fall injury  5/14/2025 1324 by Socorro Monsivais RN  Outcome: Adequate for Discharge  5/14/2025 1323 by Socorro Monsivais

## 2025-05-14 NOTE — DISCHARGE INSTRUCTIONS
Nonweightbearing left lower extremity  Remain in splint  Reinforce dressing if needed  DVT prevention:  Lovenox 40mg SQ QD for 4 weeks   Anticipate nonweightbearing restrictions times a minimum of 6 weeks.  Will transition to cam boot at office follow-up.  Return to clinic in 2 weeks for wound check, possible suture removal, repeat x-rays

## 2025-05-14 NOTE — FLOWSHEET NOTE
05/13/25 1953   Vital Signs   Temp 97.5 °F (36.4 °C)   Temp Source Oral   Pulse 72   Heart Rate Source Monitor   Respirations 16   /63   MAP (Calculated) 86   BP Location Right upper arm   BP Method Automatic   Patient Position Semi fowlers   Opioid-Induced Sedation   POSS Score 1   Oxygen Therapy   SpO2 95 %   O2 Device Nasal cannula   O2 Flow Rate (L/min) 3 L/min     Shift assessment is complete. The pt is A&O x 4, The pt denies any further needs at this time. The pt call light and personal items are with in reach. The bed  alarm is on. Will continue to monitor.

## 2025-05-14 NOTE — CARE COORDINATION
DISCHARGE ORDER  Date/Time 2025 2:34 PM  Completed by: Leticia Patiño RN, Case Management    Patient Name: Carol Dasilva      : 1955  Admitting Diagnosis: Tibia/fibula fracture, left, closed, initial encounter [S82.202A, S82.402A]      Admit order Date and Status: inpt  (verify MD's last order for status of admission)      Noted discharge order.   If applicable PT/OT recommendation at Discharge: Home with   24 hr assistance and Home PT   DME recommendation by PT/OT:RW, Manual W/C, and gait belt   Confirmed discharge plan  : Yes  with whom patient  If pt confirmed DC plan does family need to be contacted by CM Yes if yes who spouse  Discharge Plan: Order for dc noted. Spoke with pt and spouse at bedside. Plan is home with 24/7 assist and HHC. Pt and spouse agreeable to[facility]. Referral called to Juan A at [facility] who will arrange for HHC and pull HHC order and dc info from epic. Pt givn RW but declines W/C and gait belt. Chart reviewed and no other dc needs identified.    Date of Last IMM Given: 25    Reviewed chart.  Role of discharge planner explained and patient verbalized understanding. Discharge order is noted.    Has Home O2 in place on admit:  No  Informed of need to bring portable home O2 tank on day of discharge for nursing to connect prior to leaving:   Not Indicated  Verbalized agreement/Understanding:   Not Indicated  Pt is being d/c'd to home today. Pt's O2 sats are 96% on RA.    Discharge timeout done with nsg, CM and pt. All discharge needs and concerns addressed.

## 2025-05-14 NOTE — DISCHARGE INSTR - COC
Continuity of Care Form    Patient Name: Carol Dasilva   :  1955  MRN:  [id number]    Admit date:  2025  Discharge date:  2025    Code Status Order: Full Code   Advance Directives:    Date/Time Healthcare Directive Type of Healthcare Directive Copy in Chart Healthcare Agent Appointed Healthcare Agent's Name Healthcare Agent's Phone Number    25 1326 Yes, patient has an advance directive for healthcare treatment  Living will  No, copy requested from family  --  --  --     25 1242 No, patient does not have an advance directive for healthcare treatment  --  --  --  --  --             Admitting Physician:  Toby Blount MD  PCP: Pham Aburto DO    Discharging Nurse: Vandana   Discharging Hospital Unit/Room#: 0212/0212-01  Discharging Unit Phone Number: [phone]    Emergency Contact:   Extended Emergency Contact Information  Primary Emergency Contact: Rolan Dasilva  Address: [address]  Home Phone: [phone]  Mobile Phone: [phone]  Relation: Spouse  Secondary Emergency Contact: Joana Clarke   [facility]  Home Phone: [phone]  Relation: Child    Past Surgical History:  Past Surgical History:   Procedure Laterality Date    BREAST ENHANCEMENT SURGERY  1987    BREAST ENHANCEMENT SURGERY      silicone    CHOLECYSTECTOMY  2016    laparoscopic    COLPOSCOPY      DILATION AND CURETTAGE OF UTERUS  2013    EYE SURGERY  2006    both lens replaced    GALLBLADDER SURGERY      OTHER SURGICAL HISTORY Left 2025    LEFT TIBIA INTRAMEDULLARY NAILING, OPEN REDUCTION INTERNAL FIXATION DISTAL FIBULA    UPPER GASTROINTESTINAL ENDOSCOPY  2011    colonoscopy       Immunization History:   Immunization History   Administered Date(s) Administered    COVID-19, PFIZER PURPLE top, DILUTE for use, (age 12 y+), 30mcg/0.3mL 2021, 2021, 12/15/2021    Influenza Virus

## 2025-05-14 NOTE — FLOWSHEET NOTE
05/14/25 0820   Handoff   Communication Given Shift Handoff   Handoff Given To Vandana BLACK   Handoff Received From Ananya BLACK   Handoff Communication Face to Face;At bedside   Time Handoff Given 0714   End of Shift Check Performed Yes       Patient is stable. All end of shift needs have been met. No further assistance needed at this time.

## 2025-05-15 ENCOUNTER — CARE COORDINATION (OUTPATIENT)
Dept: CASE MANAGEMENT | Age: 70
End: 2025-05-15

## 2025-05-15 DIAGNOSIS — S82.202A TIBIA/FIBULA FRACTURE, LEFT, CLOSED, INITIAL ENCOUNTER: Primary | ICD-10-CM

## 2025-05-15 DIAGNOSIS — S82.402A TIBIA/FIBULA FRACTURE, LEFT, CLOSED, INITIAL ENCOUNTER: Primary | ICD-10-CM

## 2025-05-15 PROCEDURE — 1111F DSCHRG MED/CURRENT MED MERGE: CPT | Performed by: SURGERY

## 2025-05-15 NOTE — CARE COORDINATION
Care Transitions Note    Initial Call - Call within 2 business days of discharge: Yes    Patient Current Location:  [place]    Care Transition Nurse contacted the patient by telephone to perform post hospital discharge assessment, verified name and  as identifiers.  Provided introduction to self, and explanation of the Care Transition Nurse role.    Patient: Carol Dasilva    Patient : 1955   MRN: [id number]    Reason for Admission: ankle pain tibia fracture s/p nailing   Discharge Date: 25  RURS: Readmission Risk Score: 5.4      Last Discharge Facility       Date Complaint Diagnosis Description Type Department Provider    25 Ankle Pain Tibia/fibula fracture, left, closed, initial encounter ED to Hosp-Admission (Discharged) (ADMITTED) [facility] Toby Uriostegui MD; Eipp...            Was this an external facility discharge? No    Additional needs identified to be addressed with provider   No needs identified             Method of communication with provider: none.    Patients top risk factors for readmission: functional physical ability and medical condition-.     Interventions to address risk factors:   Education: post op care precautions   Review of patient management of conditions/medications: .  Home Health: [facility] liaison verified HC orders were  received and scheduling will reach out to setup soc.     Care Summary Note: CTN spoke with patient who reported she is doing pretty good since getting home. Patient reported she is getting around fairly well on her main level and will try to go up stairs today to get a shower. Patient remains NWB on lower leg and is using walker. Patient is managing pain well with Tylenol. CTN reviewed all medications with patient who reported she is taking all as prescribed. Patient will start Lovenox injections tonight. CTN discussed precautions and s/sx to monitor. Denies any acute needs at present time.  Agreeable to f/u calls.  Educated on the use
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
## 2025-05-19 ENCOUNTER — OFFICE VISIT (OUTPATIENT)
Dept: FAMILY MEDICINE CLINIC | Age: 70
End: 2025-05-19

## 2025-05-19 VITALS
SYSTOLIC BLOOD PRESSURE: 132 MMHG | DIASTOLIC BLOOD PRESSURE: 76 MMHG | OXYGEN SATURATION: 98 % | RESPIRATION RATE: 16 BRPM | TEMPERATURE: 97 F | HEART RATE: 67 BPM

## 2025-05-19 DIAGNOSIS — S82.252D CLOSED DISPLACED COMMINUTED FRACTURE OF SHAFT OF LEFT TIBIA WITH ROUTINE HEALING, SUBSEQUENT ENCOUNTER: ICD-10-CM

## 2025-05-19 DIAGNOSIS — Z09 HOSPITAL DISCHARGE FOLLOW-UP: Primary | ICD-10-CM

## 2025-05-19 SDOH — ECONOMIC STABILITY: FOOD INSECURITY: WITHIN THE PAST 12 MONTHS, YOU WORRIED THAT YOUR FOOD WOULD RUN OUT BEFORE YOU GOT MONEY TO BUY MORE.: NEVER TRUE

## 2025-05-19 SDOH — ECONOMIC STABILITY: FOOD INSECURITY: WITHIN THE PAST 12 MONTHS, THE FOOD YOU BOUGHT JUST DIDN'T LAST AND YOU DIDN'T HAVE MONEY TO GET MORE.: NEVER TRUE

## 2025-05-19 ASSESSMENT — PATIENT HEALTH QUESTIONNAIRE - PHQ9
SUM OF ALL RESPONSES TO PHQ QUESTIONS 1-9: 0
SUM OF ALL RESPONSES TO PHQ QUESTIONS 1-9: 0
2. FEELING DOWN, DEPRESSED OR HOPELESS: NOT AT ALL
SUM OF ALL RESPONSES TO PHQ QUESTIONS 1-9: 0
1. LITTLE INTEREST OR PLEASURE IN DOING THINGS: NOT AT ALL
SUM OF ALL RESPONSES TO PHQ QUESTIONS 1-9: 0

## 2025-05-19 NOTE — PROGRESS NOTES
Post-Discharge Transitional Care  Follow Up      Carol Dasilva   YOB: 1955    Date of Office Visit:  5/19/2025  Date of Hospital Admission: 5/12/25  Date of Hospital Discharge: 5/14/25  Risk of hospital readmission (high >=14%. Medium >=10%) :Readmission Risk Score: 5.4      Care management risk score Rising risk (score 2-5) and Complex Care (Scores >=6): No Risk Score On File     Non face to face  following discharge, date last encounter closed (first attempt may have been earlier): 05/15/2025    Call initiated 2 business days of discharge: Yes    ASSESSMENT/PLAN:   Hospital discharge follow-up  -     SC DISCHARGE MEDS RECONCILED W/ CURRENT OUTPATIENT MED LIST  Closed displaced comminuted fracture of shaft of left tibia with routine healing, subsequent encounter  -     Handicap Placard MISC; Starting Mon 5/19/2025, Disp-1 each, R-0, PrintExpires: 11/14/25      Medical Decision Making: moderate complexity  No follow-ups on file.           Subjective:   HPI:  Follow up of Hospital problems/diagnosis(es):     Shanelle Sanchez  5/12-5/14  Tib/fib fx s/p ORIF fib, intramedullary nail tib  D/c on lovenox, anticipate 6w NWB    Inpatient course: Discharge summary reviewed- see chart.    Interval history/Current status:  History of Present Illness  The patient presents for evaluation of pain management and constipation. She is accompanied by her .    She has been managing her pain effectively without the need for prescription medication. She has been using Tylenol 500 mg, taking two tablets in the morning and at night, with one instance of a third dose. No analgesics have been required today. She has been adhering to a non-weightbearing regimen for the next 6 weeks, as advised by Dr. Olmstead, with whom she has a scheduled follow-up on 06/05/2025. Mobility is limited, particularly when ascending stairs, due to a lack of strength. She has managed to ascend twice for showers but finds it challenging. She is

## 2025-05-22 ENCOUNTER — CARE COORDINATION (OUTPATIENT)
Dept: CASE MANAGEMENT | Age: 70
End: 2025-05-22

## 2025-05-22 NOTE — CARE COORDINATION
Care Transitions Note    Follow Up Call     Patient Current Location:  [place]    Care Transition Nurse contacted the patient by telephone. Verified name and  as identifiers.    Additional needs identified to be addressed with provider   No needs identified                 Method of communication with provider: none.    Care Summary Note: CTN spoke with patient who reported she is doing well. Patient denied any pain at this time and reported her bandage is CD&I. Patient had follow up with PCP and denied any changes to plan of care or medications. CTN advised patient of use of urgent care or physician’s 24 hr access line if assistance is needed after hours.         Plan of care updates since last contact:  Review of patient management of conditions/medications: .       Advance Care Planning:   Does patient have an Advance Directive: reviewed during previous call, see note. .    Medication Review:  No changes since last call.     Remote Patient Monitoring:  Offered patient enrollment in the Remote Patient Monitoring (RPM) program for in-home monitoring: review on follow up HTN     Assessments:  Care Transitions Subsequent and Final Call    Subsequent and Final Calls  Do you have any ongoing symptoms?: No  Have your medications changed?: No  Do you have any questions related to your medications?: No  Do you currently have any active services?: No  Do you have any needs or concerns that I can assist you with?: No  Identified Barriers: None  Care Transitions Interventions  Other Interventions:              Follow Up Appointment:   ESTRELLA appointment attended as scheduled   Future Appointments         Provider Specialty Dept Phone    2025 9:15 AM Tomás Olmstead MD Orthopedic Surgery [phone]    2025 9:20 AM Pham Aburto DO Family Medicine [phone]            Care Transition Nurse provided contact information.  Plan for follow-up call in 6-10 days based on severity of symptoms and risk factors.  Plan

## 2025-05-29 ENCOUNTER — CARE COORDINATION (OUTPATIENT)
Dept: CASE MANAGEMENT | Age: 70
End: 2025-05-29

## 2025-05-29 NOTE — CARE COORDINATION
Care Transitions Note    Follow Up Call     Patient Current Location:  [place]    Care Transition Nurse contacted the patient by telephone. Verified name and  as identifiers.    Additional needs identified to be addressed with provider   No needs identified                 Method of communication with provider: none.    Care Summary Note: CTN spoke with patient who reported she is doing great. Patient reported OT saw her on Tuesday and reported it was patient's final visit as patient is progressing well at this time. Patient remains NWB on her LE. Patient has apt with Dr. Olmstead on  to assess incision site and take x-rays. Patient reported PT will not be back out until after . Denies any acute needs at present time.  Agreeable to f/u calls.  Educated on the use of urgent care or physician’s 24 hr access line if assistance is needed after hours.       Plan of care updates since last contact:  Review of patient management of conditions/medications: .       Advance Care Planning:   Does patient have an Advance Directive: reviewed during previous call, see note. .    Medication Review:  No changes since last call.     Remote Patient Monitoring:  Offered patient enrollment in the Remote Patient Monitoring (RPM) program for in-home monitoring: discuss on follow up HTN     Assessments:  Care Transitions Subsequent and Final Call    Subsequent and Final Calls  Do you have any ongoing symptoms?: No  Have your medications changed?: No  Do you have any questions related to your medications?: No  Do you currently have any active services?: Yes  Are you currently active with any services?: Home Health  Do you have any needs or concerns that I can assist you with?: No  Identified Barriers: None  Care Transitions Interventions  Other Interventions:              Follow Up Appointment:   Reviewed upcoming appointment(s).  Future Appointments         Provider Specialty Dept Phone    2025 9:15 AM Tomás Olmstead MD Orthopedic

## 2025-06-02 ENCOUNTER — TELEPHONE (OUTPATIENT)
Dept: FAMILY MEDICINE CLINIC | Age: 70
End: 2025-06-02

## 2025-06-02 NOTE — TELEPHONE ENCOUNTER
Carol Nguyen with [facility].  Asking for a verbal to open her for home care services

## 2025-06-05 ENCOUNTER — CARE COORDINATION (OUTPATIENT)
Dept: CASE MANAGEMENT | Age: 70
End: 2025-06-05

## 2025-06-05 ENCOUNTER — OFFICE VISIT (OUTPATIENT)
Dept: ORTHOPEDIC SURGERY | Age: 70
End: 2025-06-05

## 2025-06-05 DIAGNOSIS — S82.402D TIBIA/FIBULA FRACTURE, LEFT, CLOSED, WITH ROUTINE HEALING, SUBSEQUENT ENCOUNTER: Primary | ICD-10-CM

## 2025-06-05 DIAGNOSIS — S82.202D TIBIA/FIBULA FRACTURE, LEFT, CLOSED, WITH ROUTINE HEALING, SUBSEQUENT ENCOUNTER: Primary | ICD-10-CM

## 2025-06-05 NOTE — CARE COORDINATION
Care Transitions Note    Follow Up Call     Patient Current Location:  Home: [address]  [address]  [facility] [address]    Care Transition Nurse contacted the patient by telephone. Verified name and  as identifiers.    Additional needs identified to be addressed with provider   No needs identified                 Method of communication with provider: none.    Care Summary Note: Spoke with patient. States doing well.  States she saw ortho surgeon today. States incision free from redness or drainage. States swelling has improved. States not having any pain, only discomfort, and doesn't take anything for pain.  States now in a boot, and still non weight bearing.  States appetite is good and no complications with b/b.    Plan of care updates since last contact:  Review of patient management of conditions/medications:         Advance Care Planning:   Does patient have an Advance Directive: reviewed during previous call, see note. .    Medication Review:  No changes since last call.     Remote Patient Monitoring:  Offered patient enrollment in the Remote Patient Monitoring (RPM) program for in-home monitoring: Yes, but did not enroll at this time: discuss on future visit .    Assessments:  Care Transitions Subsequent and Final Call    Subsequent and Final Calls  Care Transitions Interventions  Other Interventions:              Follow Up Appointment:   Reviewed upcoming appointment(s). and ESTRELLA appointment attended as scheduled   Future Appointments         Provider Specialty Dept Phone    7/3/2025 9:45 AM Tomás Olmstead MD Orthopedic Surgery [phone]    2025 9:20 AM Pham Aburto DO Family Medicine [phone]            Care Transition Nurse provided contact information.  Plan for follow-up call in 6-10 days based on severity of symptoms and risk factors.  Plan for next call: symptom management-   self management-   FU with incision status, pain, and swelling.      Kayla Fortune RN

## 2025-06-05 NOTE — PROGRESS NOTES
ORTHOPAEDIC SURGERY FOLLOWUP VISIT    CHIEF COMPLAINT: Follow-up left tibia/fib    DATE OF INJURY: 5/12/2025  DIAGNOSIS: Left distal tibia/fibula fracture  DATE OF SURGERY: 9/13/2025, left tibia intramedullary nailing, ORIF left distal fibula fracture    HISTORY OF PRESENT ILLNESS:  70-year-old female presents 3 weeks status post left tibia intramedullary nailing, ORIF left distal fibula fracture.  Overall, she is doing well.  She reports that her pain is improving.  This is her first postoperative visit.  She denies fever, chills, night sweats.  She has been doing her home exercise program.  Denies numbness or tingling.  Has been compliant with nonweightbearing restrictions.    PHYSICAL EXAM:  General: Well-appearing.  No distress.  Left lower extremity: Presents in well-padded short leg splint.  This removed in order to examine the skin.  Incisional sites are well-approximated with nylon sutures.  Minor irritation involving suture sites.  Wounds are well-approximated.  There is no signs of dehiscence.  No open areas.  No active drainage.  No surrounding erythema.  There is moderate ankle swelling.  This is focal to the ankle.  This does not involve the knee or foot.  Ankle range of motion gently assessed is 40 degrees plantarflexion to neutral.  Sensation is intact to light touch in deep peroneal, superficial peroneal, tibial, sural, and saphenous nerve distributions.  Motor function is intact to EHL, FHL, tibialis anterior, and gastroc.  There is brisk capillary refill to the toes and a strong palpable dorsalis pedis pulse.  Compartments are soft and compressible.  There is no calf tenderness and a negative Homans' sign.    RADIOGRAPHIC EXAM:  AP and lateral projections of the left tibia/fibula demonstrate maintained position of distal tibia and fibula fractures unchanged from intraoperative fluoroscopy imaging.  There is subtle early periosteal callus formation best seen on lateral view.  No evidence of 
normal...

## 2025-06-06 ENCOUNTER — CARE COORDINATION (OUTPATIENT)
Dept: CASE MANAGEMENT | Age: 70
End: 2025-06-06

## 2025-06-06 ENCOUNTER — TELEPHONE (OUTPATIENT)
Dept: FAMILY MEDICINE CLINIC | Age: 70
End: 2025-06-06

## 2025-06-06 NOTE — CARE COORDINATION
No  Do you have any questions related to your medications?: No  Do you currently have any active services?: Yes  Are you currently active with any services?: Home Health  Do you have any needs or concerns that I can assist you with?: No  Care Transitions Interventions  No Identified Needs   Home Care Waiver: Completed     Other Interventions:              Follow Up Appointment:   ESTRELLA appointment attended as scheduled   Future Appointments         Provider Specialty Dept Phone    7/3/2025 9:45 AM Tomás Olmstead MD Orthopedic Surgery [phone]    8/12/2025 9:20 AM Pham Aburto DO Family Medicine [phone]            LPN Care Coordinator provided contact information.  Plan for follow-up call in 6-10 days based on severity of symptoms and risk factors.  Plan for next call: symptom management-.  self management-.      Nicole Siddiqui LPN

## 2025-06-10 NOTE — TELEPHONE ENCOUNTER
Pham Aburto,   [id number] minutes ago (9:09 AM)       Yes okay for verbal orders as requested.     Called & spoke with Carol Timmons

## 2025-06-13 ENCOUNTER — CARE COORDINATION (OUTPATIENT)
Dept: CASE MANAGEMENT | Age: 70
End: 2025-06-13

## 2025-07-03 ENCOUNTER — OFFICE VISIT (OUTPATIENT)
Dept: ORTHOPEDIC SURGERY | Age: 70
End: 2025-07-03

## 2025-07-03 DIAGNOSIS — S82.202D TIBIA/FIBULA FRACTURE, LEFT, CLOSED, WITH ROUTINE HEALING, SUBSEQUENT ENCOUNTER: Primary | ICD-10-CM

## 2025-07-03 DIAGNOSIS — S82.402D TIBIA/FIBULA FRACTURE, LEFT, CLOSED, WITH ROUTINE HEALING, SUBSEQUENT ENCOUNTER: Primary | ICD-10-CM

## 2025-07-03 PROCEDURE — 99024 POSTOP FOLLOW-UP VISIT: CPT | Performed by: ORTHOPAEDIC SURGERY

## 2025-07-03 NOTE — PROGRESS NOTES
ORTHOPAEDIC SURGERY FOLLOWUP VISIT     CHIEF COMPLAINT: Follow-up left tibia/fib     DATE OF INJURY: 5/12/2025  DIAGNOSIS: Left distal tibia/fibula fracture  DATE OF SURGERY: 9/13/2025, left tibia intramedullary nailing, ORIF left distal fibula fracture     HISTORY OF PRESENT ILLNESS:  70-year-old female presents 7.5 weeks status post left tibia intramedullary nailing, ORIF left distal fibula fracture.  Overall, she is doing well.  She has been doing a home exercise program to maintain the flexibility of her ankle.  She reports no pain.  Pain level 0 out of 10.  She continues to improve overall.  She denies fever, chills, night sweats, wound healing problems.  She does have minor lateral ankle irritation related to the wearing of her boot.  Is eager to progress her activities.    PHYSICAL EXAM:  General: Well-appearing.  No distress.  Left lower extremity: Incisional sites are maturely healed.  There is no signs of dehiscence.  No open areas.  No active drainage.  No surrounding erythema.  Ankle swelling is greatly diminished.  There is trace edema in the malleoli region.  This does not involve the knee or foot.  Ankle range of motion gently assessed is 40 degrees plantarflexion to 5 degrees dorsiflexion. sensation is intact to light touch in deep peroneal, superficial peroneal, tibial, sural, and saphenous nerve distributions.  Motor function is intact to EHL, FHL, tibialis anterior, and gastroc.  There is brisk capillary refill to the toes and a strong palpable dorsalis pedis pulse.  Compartments are soft and compressible.  There is no calf tenderness and a negative Homans' sign.     RADIOGRAPHIC EXAM:  3 views of the left ankle including AP, lateral, mortise x-rays demonstrate maintained position of distal tibia and fibula fractures unchanged from intraoperative fluoroscopy imaging.  There is interval development of maturing periosteal callus seen on all 3 views at the apex of the tibial fracture site as well

## 2025-07-07 DIAGNOSIS — M80.00XS AGE-RELATED OSTEOPOROSIS WITH CURRENT PATHOLOGICAL FRACTURE, SEQUELA: Primary | ICD-10-CM

## 2025-07-07 RX ORDER — ALENDRONATE SODIUM 70 MG/1
70 TABLET ORAL
Qty: 4 TABLET | Refills: 2 | Status: SHIPPED | OUTPATIENT
Start: 2025-07-07

## 2025-07-07 NOTE — TELEPHONE ENCOUNTER
LOV 5/19/2025    Future Appointments   Date Time Provider Department Center   7/9/2025  2:20 PM Kedar Arenas, [facility]   8/12/2025  9:20 AM Pham Aburto DO [facility]   8/21/2025  9:45 AM Tomás Olmstead MD [facility]     Stated that she has been taking this medication for a year now and stated that pharmacy told her Dr. Ward Canceled it . Can you please look into why this was canceled patient would like to take this until she talks to Dr. Ward at her appointment in August

## 2025-07-07 NOTE — TELEPHONE ENCOUNTER
It looks like it was cancelled during the hospital follow up medication as she reported she was not taking the medication. This must have been in error.. I have resent it to the pharmacy

## 2025-07-09 ENCOUNTER — HOSPITAL ENCOUNTER (OUTPATIENT)
Dept: PHYSICAL THERAPY | Age: 70
Setting detail: THERAPIES SERIES
Discharge: HOME | End: 2025-07-09
Payer: MEDICARE

## 2025-07-09 DIAGNOSIS — M25.572 ACUTE LEFT ANKLE PAIN: Primary | ICD-10-CM

## 2025-07-09 DIAGNOSIS — M25.672 ANKLE STIFF, LEFT: ICD-10-CM

## 2025-07-09 DIAGNOSIS — R29.898 ANKLE WEAKNESS: ICD-10-CM

## 2025-07-09 PROCEDURE — 97161 PT EVAL LOW COMPLEX 20 MIN: CPT

## 2025-07-09 PROCEDURE — 97110 THERAPEUTIC EXERCISES: CPT

## 2025-07-09 PROCEDURE — 97140 MANUAL THERAPY 1/> REGIONS: CPT

## 2025-07-09 NOTE — PLAN OF CARE
[] Not Met: [] Adjusted  4. Patient will return to walking 1 mile without increased symptoms or restriction to enable patient to resume PLOF and lifestyle.   [] Progressing: [] Met: [] Not Met: [] Adjusted    Overall Progression Towards Functional goals/ Treatment Progress Update:  [] Patient is progressing as expected towards functional goals listed.    [] Progression is slowed due to complexities/Impairments listed.  [] Progression has been slowed due to co-morbidities.  [x] Plan just implemented, too soon (<30days) to assess goals progression   [] Goals require adjustment due to lack of progress  [] Patient is not progressing as expected and requires additional follow up with physician  [] Other:     TREATMENT PLAN     Frequency/Duration: 1-2x/week for 12 weeks for the following treatment interventions:    Interventions:  [x] Therapeutic exercise including: strength training, ROM, including postural re-education.   [x] NMR activation and proprioception, including postural re-education.    [x] Manual therapy as indicated to include: PROM, Gr I-IV mobilizations, and STM  [x] Modalities as needed that may include: NONE  [x] Patient education on joint protection, postural re-education, activity modification, progression of HEP.       Plan: POC initiated as per evaluation    Electronically Signed by Kedar Arenas PT Date: 07/09/2025     Note: Portions of this note have been templated and/or copied from initial evaluation, reassessments and prior notes for documentation efficiency.    Note: If patient does not return for scheduled/recommended follow up visits, this note will serve as a discharge from care along with the most recent update on progress.    Ortho Evaluation

## 2025-07-11 ENCOUNTER — HOSPITAL ENCOUNTER (OUTPATIENT)
Dept: PHYSICAL THERAPY | Age: 70
Setting detail: THERAPIES SERIES
Discharge: HOME | End: 2025-07-11
Payer: MEDICARE

## 2025-07-11 PROCEDURE — 97140 MANUAL THERAPY 1/> REGIONS: CPT

## 2025-07-11 PROCEDURE — 97110 THERAPEUTIC EXERCISES: CPT

## 2025-07-11 NOTE — FLOWSHEET NOTE
complexities/Impairments listed.  [] Progression has been slowed due to co-morbidities.  [] Plan just implemented, too soon (<30days) to assess goals progression   [] Goals require adjustment due to lack of progress  [] Patient is not progressing as expected and requires additional follow up with physician  [] Other:     TREATMENT PLAN     Frequency/Duration: 1-2x/week for 12 weeks for the following treatment interventions:    Interventions:  [x] Therapeutic exercise including: strength training, ROM, including postural re-education.   [x] NMR activation and proprioception, including postural re-education.    [x] Manual therapy as indicated to include: PROM, Gr I-IV mobilizations, and STM  [x] Modalities as needed that may include: NONE  [x] Patient education on joint protection, postural re-education, activity modification, progression of HEP.       Plan: POC initiated as per evaluation    Electronically Signed by Kedar Arenas PT Date: 07/11/2025     Note: Portions of this note have been templated and/or copied from initial evaluation, reassessments and prior notes for documentation efficiency.    Note: If patient does not return for scheduled/recommended follow up visits, this note will serve as a discharge from care along with the most recent update on progress.    Ortho Evaluation

## 2025-07-16 ENCOUNTER — HOSPITAL ENCOUNTER (OUTPATIENT)
Dept: PHYSICAL THERAPY | Age: 70
Setting detail: THERAPIES SERIES
Discharge: HOME | End: 2025-07-16
Payer: MEDICARE

## 2025-07-16 PROCEDURE — 97140 MANUAL THERAPY 1/> REGIONS: CPT

## 2025-07-16 PROCEDURE — 97110 THERAPEUTIC EXERCISES: CPT

## 2025-07-16 NOTE — FLOWSHEET NOTE
of house and on stairs, doing fine at home around house out of boot in sticky sock. Minimal pain       OBJECTIVE EXAMINATION        Test used Initial score  7/9/25 07/16/2025   Pain Summary VAS 0-7    Functional questionnaire LEFS 61%    ROM Ankle DF  PF 2  35          Strength Ankle DF  PF  Nina  Inv 3+  3+  3  3            Exercises/Interventions     Therapeutic Ex (65329)/NMR re-education (60638)  Sets/reps Resistance Notes/Cues/Progressions   Gastroc strap stretch 2 min     Ankle TB 4 way 30 x Blue    Ankle alphabet 2 x     Bridges 20 x     3D clamshells 20 x  OVL    3D weight shift 20 x     Standing heel raise  Standing toe raise 20 x  20 x     Standing hip 3 way 15 x ea     Mini squat 20 x     Incline board stretch 3 way 1 min ea     Stairs 10 x                              Pt and spouse education - boot use, progressions, PT purpose 10 min     Manual Intervention (27547) Time     STM, effleurage, jt mobs  12 min                   Modalities:    No modalities applied this session    Education/Home Exercise Program: Patient HEP program created electronically.  Refer to [employer] access code: [email or web address]    ASSESSMENT   Today's Assessment: During therapy this date, patient required visual cueing, verbal cueing, tactile cueing, muscle facilitation, modification of technique, progression of exercises and program, and manual interventions for exercise progression, improving proper muscle recruitment and activation/motor control patterns, modulating pain, promoting relaxation, and increasing ROM.Patient will continue to benefit from ongoing evaluation and advanced clinical decision from a Physical Therapist to address and improve pain control, ROM, muscle strength, neuromuscular control, endurance, and normalization of gait to safely return to PLOF, ADLs/IADLs, work/work related tasks, and recreational activity without symptoms or restrictions. No issues with progressions, updated HEP    Medical Necessity Documentation:

## 2025-07-18 ENCOUNTER — HOSPITAL ENCOUNTER (OUTPATIENT)
Dept: PHYSICAL THERAPY | Age: 70
Setting detail: THERAPIES SERIES
Discharge: HOME | End: 2025-07-18
Payer: MEDICARE

## 2025-07-18 PROCEDURE — 97140 MANUAL THERAPY 1/> REGIONS: CPT

## 2025-07-18 PROCEDURE — 97110 THERAPEUTIC EXERCISES: CPT

## 2025-07-18 NOTE — FLOWSHEET NOTE
Progress Update:  [x] Patient is progressing as expected towards functional goals listed.    [] Progression is slowed due to complexities/Impairments listed.  [] Progression has been slowed due to co-morbidities.  [] Plan just implemented, too soon (<30days) to assess goals progression   [] Goals require adjustment due to lack of progress  [] Patient is not progressing as expected and requires additional follow up with physician  [] Other:     TREATMENT PLAN     Frequency/Duration: 1-2x/week for 12 weeks for the following treatment interventions:    Interventions:  [x] Therapeutic exercise including: strength training, ROM, including postural re-education.   [x] NMR activation and proprioception, including postural re-education.    [x] Manual therapy as indicated to include: PROM, Gr I-IV mobilizations, and STM  [x] Modalities as needed that may include: NONE  [x] Patient education on joint protection, postural re-education, activity modification, progression of HEP.       Plan: POC initiated as per evaluation    Electronically Signed by Kedar Arenas PT Date: 07/18/2025     Note: Portions of this note have been templated and/or copied from initial evaluation, reassessments and prior notes for documentation efficiency.    Note: If patient does not return for scheduled/recommended follow up visits, this note will serve as a discharge from care along with the most recent update on progress.    Ortho Evaluation

## 2025-07-23 ENCOUNTER — HOSPITAL ENCOUNTER (OUTPATIENT)
Dept: PHYSICAL THERAPY | Age: 70
Setting detail: THERAPIES SERIES
Discharge: HOME | End: 2025-07-23
Payer: MEDICARE

## 2025-07-23 PROCEDURE — 97140 MANUAL THERAPY 1/> REGIONS: CPT

## 2025-07-23 PROCEDURE — 97110 THERAPEUTIC EXERCISES: CPT
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
## 2025-07-25 ENCOUNTER — HOSPITAL ENCOUNTER (OUTPATIENT)
Dept: PHYSICAL THERAPY | Age: 70
Setting detail: THERAPIES SERIES
Discharge: HOME | End: 2025-07-25
Payer: MEDICARE

## 2025-07-25 PROCEDURE — 97140 MANUAL THERAPY 1/> REGIONS: CPT

## 2025-07-25 PROCEDURE — 97110 THERAPEUTIC EXERCISES: CPT
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
## 2025-07-30 ENCOUNTER — HOSPITAL ENCOUNTER (OUTPATIENT)
Dept: PHYSICAL THERAPY | Age: 70
Setting detail: THERAPIES SERIES
Discharge: HOME | End: 2025-07-30
Payer: MEDICARE

## 2025-07-30 PROCEDURE — 97110 THERAPEUTIC EXERCISES: CPT

## 2025-07-30 PROCEDURE — 97112 NEUROMUSCULAR REEDUCATION: CPT
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
## 2025-08-01 ENCOUNTER — HOSPITAL ENCOUNTER (OUTPATIENT)
Dept: PHYSICAL THERAPY | Age: 70
Setting detail: THERAPIES SERIES
Discharge: HOME | End: 2025-08-01
Payer: MEDICARE

## 2025-08-01 PROCEDURE — 97112 NEUROMUSCULAR REEDUCATION: CPT

## 2025-08-01 PROCEDURE — 97110 THERAPEUTIC EXERCISES: CPT

## 2025-08-01 NOTE — FLOWSHEET NOTE
self care, mobility, lifting and ambulation  [] ([id number]) GAIT RE-EDUCATION - Provided training and instruction to the patient for proper joint and muscle recruitment and positioning and eccentric body weight control with ambulation re-education including up and down stairs. Therapeutic procedure, one or more areas, each 15 minutes;   [] ([id number]) VASOPNEUMATIC    GOALS     Patient stated goal: Pain free unassisted walking  [] Progressing: [] Met: [] Not Met: [] Adjusted    Therapist goals for Patient:   Short Term Goals: To be achieved in: 2 weeks  1. Independent in HEP and progression per patient tolerance, in order to prevent re-injury.   [] Progressing: [x] Met: [] Not Met: [] Adjusted  2. Patient will have a decrease in pain to <0/10 to facilitate improvement in movement, function, and ADLs as indicated by Functional Deficits.  [x] Progressing: [] Met: [] Not Met: [] Adjusted    Long Term Goals: To be achieved in: 12 weeks  1. Disability index score of 30% or less for the LEFS to assist with reaching prior level of function with activities such as daily household chores including laundry and cleaning.  [x] Progressing: [] Met: [] Not Met: [] Adjusted  2. Patient will demonstrate increased AROM of ankle DF to at least 10 degrees without pain to allow for proper joint functioning to enable patient to walk with appropriate mechanics and step through.   [x] Progressing: [] Met: [] Not Met: [] Adjusted  3. Patient will demonstrate increased Strength of ankle DF/PF to at least 4+/5 throughout without pain to allow for proper functional mobility to enable patient to return to go up/down stairs.   [x] Progressing: [] Met: [] Not Met: [] Adjusted  4. Patient will return to walking 1 mile without increased symptoms or restriction to enable patient to resume PLOF and lifestyle.   [x] Progressing: [] Met: [] Not Met: [] Adjusted    Overall Progression Towards Functional goals/ Treatment Progress Update:  [x] Patient is

## 2025-08-07 ENCOUNTER — HOSPITAL ENCOUNTER (OUTPATIENT)
Dept: PHYSICAL THERAPY | Age: 70
Setting detail: THERAPIES SERIES
Discharge: HOME | End: 2025-08-07
Payer: MEDICARE

## 2025-08-07 PROCEDURE — 97110 THERAPEUTIC EXERCISES: CPT

## 2025-08-07 PROCEDURE — 97112 NEUROMUSCULAR REEDUCATION: CPT

## 2025-08-09 SDOH — HEALTH STABILITY: PHYSICAL HEALTH: ON AVERAGE, HOW MANY DAYS PER WEEK DO YOU ENGAGE IN MODERATE TO STRENUOUS EXERCISE (LIKE A BRISK WALK)?: 0 DAYS

## 2025-08-09 SDOH — HEALTH STABILITY: PHYSICAL HEALTH: ON AVERAGE, HOW MANY MINUTES DO YOU ENGAGE IN EXERCISE AT THIS LEVEL?: 0 MIN

## 2025-08-09 ASSESSMENT — LIFESTYLE VARIABLES
HOW OFTEN DO YOU HAVE A DRINK CONTAINING ALCOHOL: 1
HOW OFTEN DO YOU HAVE A DRINK CONTAINING ALCOHOL: NEVER
HOW MANY STANDARD DRINKS CONTAINING ALCOHOL DO YOU HAVE ON A TYPICAL DAY: PATIENT DOES NOT DRINK
HOW OFTEN DO YOU HAVE SIX OR MORE DRINKS ON ONE OCCASION: 1
HOW MANY STANDARD DRINKS CONTAINING ALCOHOL DO YOU HAVE ON A TYPICAL DAY: 0

## 2025-08-09 ASSESSMENT — PATIENT HEALTH QUESTIONNAIRE - PHQ9
SUM OF ALL RESPONSES TO PHQ QUESTIONS 1-9: 0
2. FEELING DOWN, DEPRESSED OR HOPELESS: NOT AT ALL
1. LITTLE INTEREST OR PLEASURE IN DOING THINGS: NOT AT ALL

## 2025-08-12 ENCOUNTER — OFFICE VISIT (OUTPATIENT)
Dept: FAMILY MEDICINE CLINIC | Age: 70
End: 2025-08-12
Payer: MEDICARE

## 2025-08-12 VITALS
BODY MASS INDEX: 27.03 KG/M2 | HEART RATE: 84 BPM | TEMPERATURE: 97.1 F | DIASTOLIC BLOOD PRESSURE: 82 MMHG | RESPIRATION RATE: 15 BRPM | WEIGHT: 143.2 LBS | HEIGHT: 61 IN | OXYGEN SATURATION: 97 % | SYSTOLIC BLOOD PRESSURE: 124 MMHG

## 2025-08-12 DIAGNOSIS — E78.00 HYPERCHOLESTEREMIA: ICD-10-CM

## 2025-08-12 DIAGNOSIS — I10 PRIMARY HYPERTENSION: Primary | ICD-10-CM

## 2025-08-12 DIAGNOSIS — R73.9 HYPERGLYCEMIA: ICD-10-CM

## 2025-08-12 DIAGNOSIS — M80.00XD AGE-RELATED OSTEOPOROSIS WITH CURRENT PATHOLOGICAL FRACTURE WITH ROUTINE HEALING, SUBSEQUENT ENCOUNTER: ICD-10-CM

## 2025-08-12 DIAGNOSIS — Z86.711 PERSONAL HISTORY OF PULMONARY EMBOLISM: ICD-10-CM

## 2025-08-12 DIAGNOSIS — I10 PRIMARY HYPERTENSION: ICD-10-CM

## 2025-08-12 DIAGNOSIS — Z87.898 HISTORY OF MOTION SICKNESS: ICD-10-CM

## 2025-08-12 DIAGNOSIS — M81.0 OSTEOPOROSIS WITHOUT CURRENT PATHOLOGICAL FRACTURE, UNSPECIFIED OSTEOPOROSIS TYPE: ICD-10-CM

## 2025-08-12 DIAGNOSIS — Z00.00 MEDICARE ANNUAL WELLNESS VISIT, SUBSEQUENT: ICD-10-CM

## 2025-08-12 PROCEDURE — G0439 PPPS, SUBSEQ VISIT: HCPCS | Performed by: SURGERY

## 2025-08-12 PROCEDURE — 3074F SYST BP LT 130 MM HG: CPT | Performed by: SURGERY

## 2025-08-12 PROCEDURE — 1123F ACP DISCUSS/DSCN MKR DOCD: CPT | Performed by: SURGERY

## 2025-08-12 PROCEDURE — 1159F MED LIST DOCD IN RCRD: CPT | Performed by: SURGERY

## 2025-08-12 PROCEDURE — 3079F DIAST BP 80-89 MM HG: CPT | Performed by: SURGERY

## 2025-08-12 RX ORDER — SCOPOLAMINE 1 MG/3D
1 PATCH, EXTENDED RELEASE TRANSDERMAL
Qty: 2 PATCH | Refills: 0 | Status: SHIPPED | OUTPATIENT
Start: 2025-08-12

## 2025-08-12 SDOH — ECONOMIC STABILITY: FOOD INSECURITY: WITHIN THE PAST 12 MONTHS, THE FOOD YOU BOUGHT JUST DIDN'T LAST AND YOU DIDN'T HAVE MONEY TO GET MORE.: NEVER TRUE

## 2025-08-12 SDOH — ECONOMIC STABILITY: FOOD INSECURITY: WITHIN THE PAST 12 MONTHS, YOU WORRIED THAT YOUR FOOD WOULD RUN OUT BEFORE YOU GOT MONEY TO BUY MORE.: NEVER TRUE

## 2025-08-13 LAB
ALBUMIN SERPL-MCNC: 4.7 G/DL (ref 3.4–5)
ALBUMIN/GLOB SERPL: 1.8 {RATIO} (ref 1.1–2.2)
ALP SERPL-CCNC: 89 U/L (ref 40–129)
ALT SERPL-CCNC: 36 U/L (ref 10–40)
ANION GAP SERPL CALCULATED.3IONS-SCNC: 11 MMOL/L (ref 3–16)
AST SERPL-CCNC: 24 U/L (ref 15–37)
BASOPHILS # BLD: 0.1 K/UL (ref 0–0.2)
BASOPHILS NFR BLD: 1.1 %
BILIRUB SERPL-MCNC: 0.5 MG/DL (ref 0–1)
BUN SERPL-MCNC: 16 MG/DL (ref 7–20)
CALCIUM SERPL-MCNC: 9.6 MG/DL (ref 8.3–10.6)
CHLORIDE SERPL-SCNC: 106 MMOL/L (ref 99–110)
CHOLEST SERPL-MCNC: 211 MG/DL (ref 0–199)
CO2 SERPL-SCNC: 24 MMOL/L (ref 21–32)
CREAT SERPL-MCNC: 0.7 MG/DL (ref 0.6–1.2)
DEPRECATED RDW RBC AUTO: 13.8 % (ref 12.4–15.4)
EOSINOPHIL # BLD: 0.1 K/UL (ref 0–0.6)
EOSINOPHIL NFR BLD: 1.5 %
EST. AVERAGE GLUCOSE BLD GHB EST-MCNC: 114 MG/DL
GFR SERPLBLD CREATININE-BSD FMLA CKD-EPI: >90 ML/MIN/{1.73_M2}
GLUCOSE SERPL-MCNC: 102 MG/DL (ref 70–99)
HBA1C MFR BLD: 5.6 %
HCT VFR BLD AUTO: 42 % (ref 36–48)
HDLC SERPL-MCNC: 61 MG/DL (ref 40–60)
HGB BLD-MCNC: 13.8 G/DL (ref 12–16)
LDL CHOLESTEROL: 113 MG/DL
LYMPHOCYTES # BLD: 2.5 K/UL (ref 1–5.1)
LYMPHOCYTES NFR BLD: 42 %
MCH RBC QN AUTO: 29.4 PG (ref 26–34)
MCHC RBC AUTO-ENTMCNC: 32.7 G/DL (ref 31–36)
MCV RBC AUTO: 89.8 FL (ref 80–100)
MONOCYTES # BLD: 0.3 K/UL (ref 0–1.3)
MONOCYTES NFR BLD: 4.5 %
NEUTROPHILS # BLD: 3 K/UL (ref 1.7–7.7)
NEUTROPHILS NFR BLD: 50.9 %
PLATELET # BLD AUTO: 365 K/UL (ref 135–450)
PMV BLD AUTO: 8.1 FL (ref 5–10.5)
POTASSIUM SERPL-SCNC: 4.4 MMOL/L (ref 3.5–5.1)
PROT SERPL-MCNC: 7.3 G/DL (ref 6.4–8.2)
RBC # BLD AUTO: 4.68 M/UL (ref 4–5.2)
SODIUM SERPL-SCNC: 141 MMOL/L (ref 136–145)
TRIGL SERPL-MCNC: 183 MG/DL (ref 0–150)
TSH SERPL DL<=0.005 MIU/L-ACNC: 1.26 UIU/ML (ref 0.27–4.2)
VLDLC SERPL CALC-MCNC: 37 MG/DL
WBC # BLD AUTO: 6 K/UL (ref 4–11)

## 2025-08-14 ENCOUNTER — HOSPITAL ENCOUNTER (OUTPATIENT)
Dept: PHYSICAL THERAPY | Age: 70
Setting detail: THERAPIES SERIES
Discharge: HOME | End: 2025-08-14
Payer: MEDICARE

## 2025-08-14 ENCOUNTER — RESULTS FOLLOW-UP (OUTPATIENT)
Dept: FAMILY MEDICINE CLINIC | Age: 70
End: 2025-08-14

## 2025-08-14 PROCEDURE — 97110 THERAPEUTIC EXERCISES: CPT

## 2025-08-14 PROCEDURE — 97112 NEUROMUSCULAR REEDUCATION: CPT

## 2025-08-18 ENCOUNTER — HOSPITAL ENCOUNTER (OUTPATIENT)
Dept: GENERAL RADIOLOGY | Age: 70
Discharge: HOME | End: 2025-08-18
Attending: SURGERY
Payer: MEDICARE

## 2025-08-18 DIAGNOSIS — M80.00XD AGE-RELATED OSTEOPOROSIS WITH CURRENT PATHOLOGICAL FRACTURE WITH ROUTINE HEALING, SUBSEQUENT ENCOUNTER: ICD-10-CM

## 2025-08-18 DIAGNOSIS — M81.0 OSTEOPOROSIS WITHOUT CURRENT PATHOLOGICAL FRACTURE, UNSPECIFIED OSTEOPOROSIS TYPE: ICD-10-CM

## 2025-08-18 PROCEDURE — 77080 DXA BONE DENSITY AXIAL: CPT

## 2025-08-21 ENCOUNTER — OFFICE VISIT (OUTPATIENT)
Dept: ORTHOPEDIC SURGERY | Age: 70
End: 2025-08-21

## 2025-08-21 ENCOUNTER — HOSPITAL ENCOUNTER (OUTPATIENT)
Dept: PHYSICAL THERAPY | Age: 70
Setting detail: THERAPIES SERIES
Discharge: HOME | End: 2025-08-21
Payer: MEDICARE

## 2025-08-21 DIAGNOSIS — S82.202D TIBIA/FIBULA FRACTURE, LEFT, CLOSED, WITH ROUTINE HEALING, SUBSEQUENT ENCOUNTER: Primary | ICD-10-CM

## 2025-08-21 DIAGNOSIS — S82.402D TIBIA/FIBULA FRACTURE, LEFT, CLOSED, WITH ROUTINE HEALING, SUBSEQUENT ENCOUNTER: Primary | ICD-10-CM

## 2025-08-21 PROCEDURE — 97112 NEUROMUSCULAR REEDUCATION: CPT

## 2025-08-21 PROCEDURE — 97110 THERAPEUTIC EXERCISES: CPT

## 2025-08-21 PROCEDURE — 99024 POSTOP FOLLOW-UP VISIT: CPT | Performed by: ORTHOPAEDIC SURGERY

## (undated) DEVICE — BIT DRL L330MM DIA4.2MM CALIB L100MM ST 3 FLUT QUIK CPL FOR

## (undated) DEVICE — 3M™ COBAN™ NL STERILE NON-LATEX SELF-ADHERENT WRAP, 2084S, 4 IN X 5 YD (10 CM X 4,5 M), 18 ROLLS/CASE: Brand: 3M™ COBAN™

## (undated) DEVICE — GLOVE ORANGE PI 7 1/2   MSG9075

## (undated) DEVICE — DRAPE C ARM UNIV W41XL74IN CLR PLAS XR VELC CLSR POLY STRP

## (undated) DEVICE — SLEEVE PROTECTION 8MM/11MM -

## (undated) DEVICE — MHCZ EXTREMITY: Brand: MEDLINE INDUSTRIES, INC.

## (undated) DEVICE — GAUZE,SPONGE,4"X4",8PLY,STRL,LF,10/TRAY: Brand: MEDLINE

## (undated) DEVICE — PADDING CAST W6INXL4YD NONSTERILE COT RAYON MICROPLEATED

## (undated) DEVICE — DRESSING,GAUZE,XEROFORM,CURAD,1"X8",ST: Brand: CURAD

## (undated) DEVICE — SUTURE MONOCRYL + SZ 4-0 L18IN ABSRB UD L19MM PS-2 3/8 CIR MCP496G

## (undated) DEVICE — SUTURE VICRYL + SZ 0 L27IN ABSRB UD L36MM CT-1 1/2 CIR VCPP41D

## (undated) DEVICE — Device

## (undated) DEVICE — GUIDEWIRE ORTH L400MM DIA3.2MM FOR TFN

## (undated) DEVICE — SUTURE VICRYL + SZ 2-0 L18IN ABSRB UD CT1 L36MM 1/2 CIR VCP839D

## (undated) DEVICE — SUTURE ABSORBABLE BRAIDED 2-0 CT-1 27 IN UD VICRYL J259H

## (undated) DEVICE — COTTON UNDERCAST PADDING,CRIMPED FINISH: Brand: WEBRIL

## (undated) DEVICE — BIT DRL L110MM DIA2.5MM G QUIK CPL W/O STP REUSE

## (undated) DEVICE — BANDAGE COMPR M W6INXL10YD WHT BGE VELC E MTRX HK AND LOOP

## (undated) DEVICE — BIT DRL L140MM DIA2MM QUIK CPL 3 FLUT CALIB DEPTH MRK W/O

## (undated) DEVICE — DRAPE,REIN 53X77,STERILE: Brand: MEDLINE

## (undated) DEVICE — ROD RM 3X1150 MM W/ 3.8 MM BALL TIP STRL
Type: IMPLANTABLE DEVICE | Site: TIBIA | Status: NON-FUNCTIONAL
Removed: 2025-05-13

## (undated) DEVICE — TOWEL,OR,DSP,ST,BLUE,STD,4/PK,20PK/CS: Brand: MEDLINE

## (undated) DEVICE — BIT DRL L145MM DIA4.2MM ST 3 FLUT NDL PNT QUIK CPL FOR FEM

## (undated) DEVICE — BIT DRL L160MM DIA2.7MM CANN QUIK CPL ADJ STP REUSE FOR

## (undated) DEVICE — Device: Brand: MEDICAL ACTION INDUSTRIES